# Patient Record
Sex: FEMALE | Race: WHITE | Employment: OTHER
[De-identification: names, ages, dates, MRNs, and addresses within clinical notes are randomized per-mention and may not be internally consistent; named-entity substitution may affect disease eponyms.]

---

## 2017-01-30 ENCOUNTER — RECORDS - HEALTHEAST (OUTPATIENT)
Dept: ADMINISTRATIVE | Facility: OTHER | Age: 72
End: 2017-01-30

## 2017-01-30 ENCOUNTER — RECORDS - HEALTHEAST (OUTPATIENT)
Dept: BONE DENSITY | Facility: CLINIC | Age: 72
End: 2017-01-30

## 2017-01-30 ENCOUNTER — HOSPITAL ENCOUNTER (OUTPATIENT)
Dept: MAMMOGRAPHY | Facility: CLINIC | Age: 72
Discharge: HOME | End: 2017-01-30
Attending: INTERNAL MEDICINE

## 2017-01-30 ENCOUNTER — TRANSFERRED RECORDS (OUTPATIENT)
Dept: HEALTH INFORMATION MANAGEMENT | Facility: CLINIC | Age: 72
End: 2017-01-30

## 2017-01-30 DIAGNOSIS — S22.000G WEDGE COMPRESSION FRACTURE OF UNSPECIFIED THORACIC VERTEBRA, SUBSEQUENT ENCOUNTER FOR FRACTURE WITH DELAYED HEALING: ICD-10-CM

## 2017-01-30 DIAGNOSIS — Z12.31 VISIT FOR SCREENING MAMMOGRAM: ICD-10-CM

## 2017-01-30 DIAGNOSIS — M89.9 DISORDER OF BONE, UNSPECIFIED: ICD-10-CM

## 2017-01-30 DIAGNOSIS — M94.9 DISORDER OF CARTILAGE, UNSPECIFIED: ICD-10-CM

## 2017-01-30 DIAGNOSIS — M54.40 LUMBAGO WITH SCIATICA, UNSPECIFIED SIDE: ICD-10-CM

## 2017-05-01 ENCOUNTER — COMMUNICATION - HEALTHEAST (OUTPATIENT)
Dept: INTENSIVE CARE | Facility: CLINIC | Age: 72
End: 2017-05-01

## 2017-05-16 ENCOUNTER — COMMUNICATION - HEALTHEAST (OUTPATIENT)
Dept: INTERNAL MEDICINE | Facility: CLINIC | Age: 72
End: 2017-05-16

## 2017-07-04 ENCOUNTER — COMMUNICATION - HEALTHEAST (OUTPATIENT)
Dept: INTENSIVE CARE | Facility: CLINIC | Age: 72
End: 2017-07-04

## 2017-08-06 ENCOUNTER — COMMUNICATION - HEALTHEAST (OUTPATIENT)
Dept: INTENSIVE CARE | Facility: CLINIC | Age: 72
End: 2017-08-06

## 2017-09-30 ENCOUNTER — RECORDS - HEALTHEAST (OUTPATIENT)
Dept: ADMINISTRATIVE | Facility: OTHER | Age: 72
End: 2017-09-30

## 2017-10-02 ENCOUNTER — COMMUNICATION - HEALTHEAST (OUTPATIENT)
Dept: INTERNAL MEDICINE | Facility: CLINIC | Age: 72
End: 2017-10-02

## 2017-10-02 ENCOUNTER — OFFICE VISIT - HEALTHEAST (OUTPATIENT)
Dept: INTERNAL MEDICINE | Facility: CLINIC | Age: 72
End: 2017-10-02

## 2017-10-02 DIAGNOSIS — F41.1 ANXIETY STATE: ICD-10-CM

## 2017-10-02 DIAGNOSIS — J98.01 BRONCHOSPASM, ACUTE: ICD-10-CM

## 2017-10-02 DIAGNOSIS — I51.81 STRESS-INDUCED CARDIOMYOPATHY: ICD-10-CM

## 2017-10-02 ASSESSMENT — MIFFLIN-ST. JEOR: SCORE: 1237.5

## 2017-10-24 ENCOUNTER — COMMUNICATION - HEALTHEAST (OUTPATIENT)
Dept: INTERNAL MEDICINE | Facility: CLINIC | Age: 72
End: 2017-10-24

## 2017-10-31 ENCOUNTER — COMMUNICATION - HEALTHEAST (OUTPATIENT)
Dept: INTERNAL MEDICINE | Facility: CLINIC | Age: 72
End: 2017-10-31

## 2017-10-31 DIAGNOSIS — F41.1 ANXIETY STATE: ICD-10-CM

## 2017-11-06 ENCOUNTER — COMMUNICATION - HEALTHEAST (OUTPATIENT)
Dept: INTERNAL MEDICINE | Facility: CLINIC | Age: 72
End: 2017-11-06

## 2018-02-21 ENCOUNTER — COMMUNICATION - HEALTHEAST (OUTPATIENT)
Dept: INTENSIVE CARE | Facility: CLINIC | Age: 73
End: 2018-02-21

## 2018-02-21 DIAGNOSIS — R52 PAIN: ICD-10-CM

## 2018-03-20 ENCOUNTER — HOSPITAL ENCOUNTER (OUTPATIENT)
Dept: MAMMOGRAPHY | Facility: CLINIC | Age: 73
Discharge: HOME | End: 2018-03-20
Attending: INTERNAL MEDICINE

## 2018-03-20 DIAGNOSIS — Z12.31 VISIT FOR SCREENING MAMMOGRAM: ICD-10-CM

## 2018-03-21 ENCOUNTER — RECORDS - HEALTHEAST (OUTPATIENT)
Dept: ADMINISTRATIVE | Facility: OTHER | Age: 73
End: 2018-03-21

## 2018-04-03 ENCOUNTER — OFFICE VISIT - HEALTHEAST (OUTPATIENT)
Dept: INTERNAL MEDICINE | Facility: CLINIC | Age: 73
End: 2018-04-03

## 2018-04-03 ENCOUNTER — COMMUNICATION - HEALTHEAST (OUTPATIENT)
Dept: INTERNAL MEDICINE | Facility: CLINIC | Age: 73
End: 2018-04-03

## 2018-04-03 DIAGNOSIS — I51.81 STRESS-INDUCED CARDIOMYOPATHY: ICD-10-CM

## 2018-04-03 DIAGNOSIS — S22.080A T12 COMPRESSION FRACTURE (H): ICD-10-CM

## 2018-04-03 DIAGNOSIS — I25.10 CAD (CORONARY ARTERY DISEASE): ICD-10-CM

## 2018-04-03 DIAGNOSIS — M94.9 DISORDER OF BONE AND CARTILAGE: ICD-10-CM

## 2018-04-03 DIAGNOSIS — Z01.818 PREOP EXAMINATION: ICD-10-CM

## 2018-04-03 DIAGNOSIS — M89.9 DISORDER OF BONE AND CARTILAGE: ICD-10-CM

## 2018-04-03 LAB
ALBUMIN SERPL-MCNC: 4 G/DL (ref 3.5–5)
ALP SERPL-CCNC: 72 U/L (ref 45–120)
ALT SERPL W P-5'-P-CCNC: 16 U/L (ref 0–45)
ANION GAP SERPL CALCULATED.3IONS-SCNC: 11 MMOL/L (ref 5–18)
AST SERPL W P-5'-P-CCNC: 21 U/L (ref 0–40)
BILIRUB SERPL-MCNC: 0.4 MG/DL (ref 0–1)
BUN SERPL-MCNC: 22 MG/DL (ref 8–28)
CALCIUM SERPL-MCNC: 9.8 MG/DL (ref 8.5–10.5)
CHLORIDE BLD-SCNC: 102 MMOL/L (ref 98–107)
CHOLEST SERPL-MCNC: 205 MG/DL
CO2 SERPL-SCNC: 26 MMOL/L (ref 22–31)
CREAT SERPL-MCNC: 0.8 MG/DL (ref 0.6–1.1)
ERYTHROCYTE [DISTWIDTH] IN BLOOD BY AUTOMATED COUNT: 10.8 % (ref 11–14.5)
FASTING STATUS PATIENT QL REPORTED: ABNORMAL
GFR SERPL CREATININE-BSD FRML MDRD: >60 ML/MIN/1.73M2
GLUCOSE BLD-MCNC: 82 MG/DL (ref 70–125)
HCT VFR BLD AUTO: 39.8 % (ref 35–47)
HDLC SERPL-MCNC: 62 MG/DL
HGB BLD-MCNC: 13.3 G/DL (ref 12–16)
LDLC SERPL CALC-MCNC: 126 MG/DL
MCH RBC QN AUTO: 30.9 PG (ref 27–34)
MCHC RBC AUTO-ENTMCNC: 33.5 G/DL (ref 32–36)
MCV RBC AUTO: 92 FL (ref 80–100)
PLATELET # BLD AUTO: 164 THOU/UL (ref 140–440)
PMV BLD AUTO: 9.1 FL (ref 7–10)
POTASSIUM BLD-SCNC: 4.7 MMOL/L (ref 3.5–5)
PROT SERPL-MCNC: 7 G/DL (ref 6–8)
RBC # BLD AUTO: 4.31 MILL/UL (ref 3.8–5.4)
SODIUM SERPL-SCNC: 139 MMOL/L (ref 136–145)
TRIGL SERPL-MCNC: 85 MG/DL
WBC: 5 THOU/UL (ref 4–11)

## 2018-04-03 ASSESSMENT — MIFFLIN-ST. JEOR: SCORE: 1214.82

## 2018-04-04 LAB
ATRIAL RATE - MUSE: 66 BPM
DIASTOLIC BLOOD PRESSURE - MUSE: NORMAL MMHG
INTERPRETATION ECG - MUSE: NORMAL
P AXIS - MUSE: 48 DEGREES
PR INTERVAL - MUSE: 162 MS
QRS DURATION - MUSE: 72 MS
QT - MUSE: 384 MS
QTC - MUSE: 402 MS
R AXIS - MUSE: 4 DEGREES
SYSTOLIC BLOOD PRESSURE - MUSE: NORMAL MMHG
T AXIS - MUSE: 43 DEGREES
VENTRICULAR RATE- MUSE: 66 BPM

## 2018-04-16 RX ORDER — FLUTICASONE PROPIONATE 220 UG/1
2 AEROSOL, METERED RESPIRATORY (INHALATION) 2 TIMES DAILY
Status: ON HOLD | COMMUNITY
End: 2018-04-18

## 2018-04-16 RX ORDER — ALBUTEROL SULFATE 90 UG/1
2 AEROSOL, METERED RESPIRATORY (INHALATION) PRN
Status: ON HOLD | COMMUNITY
End: 2018-04-18

## 2018-04-18 ENCOUNTER — HOSPITAL ENCOUNTER (OUTPATIENT)
Facility: CLINIC | Age: 73
Discharge: HOME | End: 2018-04-18
Attending: ORTHOPAEDIC SURGERY | Admitting: ORTHOPAEDIC SURGERY
Payer: MEDICARE

## 2018-04-18 ENCOUNTER — ANESTHESIA EVENT (OUTPATIENT)
Dept: SURGERY | Facility: CLINIC | Age: 73
End: 2018-04-18
Payer: MEDICARE

## 2018-04-18 ENCOUNTER — ANESTHESIA (OUTPATIENT)
Dept: SURGERY | Facility: CLINIC | Age: 73
End: 2018-04-18
Payer: MEDICARE

## 2018-04-18 ENCOUNTER — SURGERY (OUTPATIENT)
Age: 73
End: 2018-04-18

## 2018-04-18 VITALS
OXYGEN SATURATION: 94 % | WEIGHT: 144.1 LBS | DIASTOLIC BLOOD PRESSURE: 57 MMHG | BODY MASS INDEX: 20.63 KG/M2 | SYSTOLIC BLOOD PRESSURE: 106 MMHG | HEIGHT: 70 IN | RESPIRATION RATE: 18 BRPM | TEMPERATURE: 97.5 F

## 2018-04-18 DIAGNOSIS — M20.5X9 ACQUIRED CLAW TOE, UNSPECIFIED LATERALITY: Primary | ICD-10-CM

## 2018-04-18 PROCEDURE — 25000128 H RX IP 250 OP 636: Performed by: NURSE ANESTHETIST, CERTIFIED REGISTERED

## 2018-04-18 PROCEDURE — 27210794 ZZH OR GENERAL SUPPLY STERILE: Performed by: ORTHOPAEDIC SURGERY

## 2018-04-18 PROCEDURE — 25000132 ZZH RX MED GY IP 250 OP 250 PS 637: Mod: GY | Performed by: ORTHOPAEDIC SURGERY

## 2018-04-18 PROCEDURE — 37000008 ZZH ANESTHESIA TECHNICAL FEE, 1ST 30 MIN: Performed by: ORTHOPAEDIC SURGERY

## 2018-04-18 PROCEDURE — 27210995 ZZH RX 272: Performed by: ORTHOPAEDIC SURGERY

## 2018-04-18 PROCEDURE — 36000056 ZZH SURGERY LEVEL 3 1ST 30 MIN: Performed by: ORTHOPAEDIC SURGERY

## 2018-04-18 PROCEDURE — 27110028 ZZH OR GENERAL SUPPLY NON-STERILE: Performed by: ORTHOPAEDIC SURGERY

## 2018-04-18 PROCEDURE — 37000009 ZZH ANESTHESIA TECHNICAL FEE, EACH ADDTL 15 MIN: Performed by: ORTHOPAEDIC SURGERY

## 2018-04-18 PROCEDURE — 25000128 H RX IP 250 OP 636: Performed by: ORTHOPAEDIC SURGERY

## 2018-04-18 PROCEDURE — 25000125 ZZHC RX 250: Performed by: ORTHOPAEDIC SURGERY

## 2018-04-18 PROCEDURE — 25000566 ZZH SEVOFLURANE, EA 15 MIN: Performed by: ORTHOPAEDIC SURGERY

## 2018-04-18 PROCEDURE — 71000013 ZZH RECOVERY PHASE 1 LEVEL 1 EA ADDTL HR: Performed by: ORTHOPAEDIC SURGERY

## 2018-04-18 PROCEDURE — 36000058 ZZH SURGERY LEVEL 3 EA 15 ADDTL MIN: Performed by: ORTHOPAEDIC SURGERY

## 2018-04-18 PROCEDURE — 25000128 H RX IP 250 OP 636: Performed by: ANESTHESIOLOGY

## 2018-04-18 PROCEDURE — 40000170 ZZH STATISTIC PRE-PROCEDURE ASSESSMENT II: Performed by: ORTHOPAEDIC SURGERY

## 2018-04-18 PROCEDURE — 40000856 ZZH STATISTIC SAME DAY SURG

## 2018-04-18 PROCEDURE — 71000012 ZZH RECOVERY PHASE 1 LEVEL 1 FIRST HR: Performed by: ORTHOPAEDIC SURGERY

## 2018-04-18 PROCEDURE — A9270 NON-COVERED ITEM OR SERVICE: HCPCS | Mod: GY | Performed by: ORTHOPAEDIC SURGERY

## 2018-04-18 PROCEDURE — 25000125 ZZHC RX 250: Performed by: NURSE ANESTHETIST, CERTIFIED REGISTERED

## 2018-04-18 DEVICE — IMP WIRE KIRSCHNER 0.054X4" 1645-10-000: Type: IMPLANTABLE DEVICE | Site: FOOT | Status: FUNCTIONAL

## 2018-04-18 RX ORDER — ONDANSETRON 4 MG/1
4 TABLET, ORALLY DISINTEGRATING ORAL EVERY 6 HOURS PRN
Status: DISCONTINUED | OUTPATIENT
Start: 2018-04-18 | End: 2018-04-18 | Stop reason: HOSPADM

## 2018-04-18 RX ORDER — IBUPROFEN 600 MG/1
600 TABLET, FILM COATED ORAL EVERY 6 HOURS PRN
Status: DISCONTINUED | OUTPATIENT
Start: 2018-04-19 | End: 2018-04-18 | Stop reason: HOSPADM

## 2018-04-18 RX ORDER — PROPOFOL 10 MG/ML
INJECTION, EMULSION INTRAVENOUS CONTINUOUS PRN
Status: DISCONTINUED | OUTPATIENT
Start: 2018-04-18 | End: 2018-04-18

## 2018-04-18 RX ORDER — ONDANSETRON 2 MG/ML
INJECTION INTRAMUSCULAR; INTRAVENOUS PRN
Status: DISCONTINUED | OUTPATIENT
Start: 2018-04-18 | End: 2018-04-18

## 2018-04-18 RX ORDER — NALOXONE HYDROCHLORIDE 0.4 MG/ML
.1-.4 INJECTION, SOLUTION INTRAMUSCULAR; INTRAVENOUS; SUBCUTANEOUS
Status: DISCONTINUED | OUTPATIENT
Start: 2018-04-18 | End: 2018-04-18 | Stop reason: HOSPADM

## 2018-04-18 RX ORDER — PROCHLORPERAZINE MALEATE 5 MG
5 TABLET ORAL EVERY 6 HOURS PRN
Status: DISCONTINUED | OUTPATIENT
Start: 2018-04-18 | End: 2018-04-18 | Stop reason: HOSPADM

## 2018-04-18 RX ORDER — CARVEDILOL 6.25 MG/1
12.5 TABLET ORAL 2 TIMES DAILY WITH MEALS
Status: DISCONTINUED | OUTPATIENT
Start: 2018-04-18 | End: 2018-04-18 | Stop reason: HOSPADM

## 2018-04-18 RX ORDER — EPHEDRINE SULFATE 50 MG/ML
INJECTION, SOLUTION INTRAMUSCULAR; INTRAVENOUS; SUBCUTANEOUS PRN
Status: DISCONTINUED | OUTPATIENT
Start: 2018-04-18 | End: 2018-04-18

## 2018-04-18 RX ORDER — OXYCODONE HYDROCHLORIDE 5 MG/1
5-10 TABLET ORAL
Qty: 60 TABLET | Refills: 0 | Status: SHIPPED | OUTPATIENT
Start: 2018-04-18 | End: 2020-07-13

## 2018-04-18 RX ORDER — OXYCODONE HYDROCHLORIDE 5 MG/1
5-10 TABLET ORAL
Status: DISCONTINUED | OUTPATIENT
Start: 2018-04-18 | End: 2018-04-18 | Stop reason: HOSPADM

## 2018-04-18 RX ORDER — ONDANSETRON 2 MG/ML
4 INJECTION INTRAMUSCULAR; INTRAVENOUS EVERY 30 MIN PRN
Status: DISCONTINUED | OUTPATIENT
Start: 2018-04-18 | End: 2018-04-18 | Stop reason: HOSPADM

## 2018-04-18 RX ORDER — MEPERIDINE HYDROCHLORIDE 25 MG/ML
12.5 INJECTION INTRAMUSCULAR; INTRAVENOUS; SUBCUTANEOUS
Status: DISCONTINUED | OUTPATIENT
Start: 2018-04-18 | End: 2018-04-18 | Stop reason: HOSPADM

## 2018-04-18 RX ORDER — MULTIPLE VITAMINS W/ MINERALS TAB 9MG-400MCG
1 TAB ORAL DAILY
COMMUNITY
End: 2020-02-27

## 2018-04-18 RX ORDER — KETOROLAC TROMETHAMINE 30 MG/ML
INJECTION, SOLUTION INTRAMUSCULAR; INTRAVENOUS PRN
Status: DISCONTINUED | OUTPATIENT
Start: 2018-04-18 | End: 2018-04-18

## 2018-04-18 RX ORDER — FENTANYL CITRATE 50 UG/ML
25-50 INJECTION, SOLUTION INTRAMUSCULAR; INTRAVENOUS
Status: DISCONTINUED | OUTPATIENT
Start: 2018-04-18 | End: 2018-04-18 | Stop reason: HOSPADM

## 2018-04-18 RX ORDER — SODIUM CHLORIDE, SODIUM LACTATE, POTASSIUM CHLORIDE, CALCIUM CHLORIDE 600; 310; 30; 20 MG/100ML; MG/100ML; MG/100ML; MG/100ML
INJECTION, SOLUTION INTRAVENOUS CONTINUOUS
Status: DISCONTINUED | OUTPATIENT
Start: 2018-04-18 | End: 2018-04-18 | Stop reason: HOSPADM

## 2018-04-18 RX ORDER — HYDROMORPHONE HYDROCHLORIDE 1 MG/ML
.3-.5 INJECTION, SOLUTION INTRAMUSCULAR; INTRAVENOUS; SUBCUTANEOUS EVERY 10 MIN PRN
Status: DISCONTINUED | OUTPATIENT
Start: 2018-04-18 | End: 2018-04-18 | Stop reason: HOSPADM

## 2018-04-18 RX ORDER — DEXAMETHASONE SODIUM PHOSPHATE 4 MG/ML
INJECTION, SOLUTION INTRA-ARTICULAR; INTRALESIONAL; INTRAMUSCULAR; INTRAVENOUS; SOFT TISSUE PRN
Status: DISCONTINUED | OUTPATIENT
Start: 2018-04-18 | End: 2018-04-18

## 2018-04-18 RX ORDER — CITALOPRAM HYDROBROMIDE 20 MG/1
20 TABLET ORAL DAILY
Status: DISCONTINUED | OUTPATIENT
Start: 2018-04-18 | End: 2018-04-18 | Stop reason: HOSPADM

## 2018-04-18 RX ORDER — ONDANSETRON 4 MG/1
4 TABLET, ORALLY DISINTEGRATING ORAL EVERY 30 MIN PRN
Status: DISCONTINUED | OUTPATIENT
Start: 2018-04-18 | End: 2018-04-18 | Stop reason: HOSPADM

## 2018-04-18 RX ORDER — BUPIVACAINE HYDROCHLORIDE 2.5 MG/ML
INJECTION, SOLUTION INFILTRATION; PERINEURAL PRN
Status: DISCONTINUED | OUTPATIENT
Start: 2018-04-18 | End: 2018-04-18 | Stop reason: HOSPADM

## 2018-04-18 RX ORDER — CEPHALEXIN 500 MG/1
500 CAPSULE ORAL 3 TIMES DAILY
Qty: 6 CAPSULE | Refills: 0 | Status: SHIPPED | OUTPATIENT
Start: 2018-04-18 | End: 2018-04-20

## 2018-04-18 RX ORDER — PROPOFOL 10 MG/ML
INJECTION, EMULSION INTRAVENOUS PRN
Status: DISCONTINUED | OUTPATIENT
Start: 2018-04-18 | End: 2018-04-18

## 2018-04-18 RX ORDER — LIDOCAINE 40 MG/G
CREAM TOPICAL
Status: DISCONTINUED | OUTPATIENT
Start: 2018-04-18 | End: 2018-04-18 | Stop reason: HOSPADM

## 2018-04-18 RX ORDER — KETOROLAC TROMETHAMINE 15 MG/ML
15 INJECTION, SOLUTION INTRAMUSCULAR; INTRAVENOUS EVERY 6 HOURS
Status: DISCONTINUED | OUTPATIENT
Start: 2018-04-18 | End: 2018-04-18 | Stop reason: HOSPADM

## 2018-04-18 RX ORDER — ACETAMINOPHEN 325 MG/1
650 TABLET ORAL EVERY 6 HOURS PRN
Status: DISCONTINUED | OUTPATIENT
Start: 2018-04-18 | End: 2018-04-18 | Stop reason: HOSPADM

## 2018-04-18 RX ORDER — CEFAZOLIN SODIUM 1 G/3ML
1 INJECTION, POWDER, FOR SOLUTION INTRAMUSCULAR; INTRAVENOUS SEE ADMIN INSTRUCTIONS
Status: DISCONTINUED | OUTPATIENT
Start: 2018-04-18 | End: 2018-04-18 | Stop reason: HOSPADM

## 2018-04-18 RX ORDER — FENTANYL CITRATE 50 UG/ML
INJECTION, SOLUTION INTRAMUSCULAR; INTRAVENOUS PRN
Status: DISCONTINUED | OUTPATIENT
Start: 2018-04-18 | End: 2018-04-18

## 2018-04-18 RX ORDER — IBUPROFEN 600 MG/1
600 TABLET, FILM COATED ORAL EVERY 6 HOURS PRN
Qty: 60 TABLET | Refills: 0 | Status: SHIPPED | OUTPATIENT
Start: 2018-04-18 | End: 2020-07-13

## 2018-04-18 RX ORDER — LIDOCAINE HYDROCHLORIDE 20 MG/ML
INJECTION, SOLUTION INFILTRATION; PERINEURAL PRN
Status: DISCONTINUED | OUTPATIENT
Start: 2018-04-18 | End: 2018-04-18

## 2018-04-18 RX ORDER — ONDANSETRON 2 MG/ML
4 INJECTION INTRAMUSCULAR; INTRAVENOUS EVERY 6 HOURS PRN
Status: DISCONTINUED | OUTPATIENT
Start: 2018-04-18 | End: 2018-04-18 | Stop reason: HOSPADM

## 2018-04-18 RX ORDER — CEFAZOLIN SODIUM 2 G/100ML
2 INJECTION, SOLUTION INTRAVENOUS
Status: COMPLETED | OUTPATIENT
Start: 2018-04-18 | End: 2018-04-18

## 2018-04-18 RX ORDER — HYDROMORPHONE HYDROCHLORIDE 1 MG/ML
0.2 INJECTION, SOLUTION INTRAMUSCULAR; INTRAVENOUS; SUBCUTANEOUS
Status: DISCONTINUED | OUTPATIENT
Start: 2018-04-18 | End: 2018-04-18 | Stop reason: HOSPADM

## 2018-04-18 RX ADMIN — PROPOFOL 150 MCG/KG/MIN: 10 INJECTION, EMULSION INTRAVENOUS at 07:29

## 2018-04-18 RX ADMIN — BUPIVACAINE HYDROCHLORIDE 3 ML: 2.5 INJECTION, SOLUTION EPIDURAL; INFILTRATION; INTRACAUDAL; PERINEURAL at 08:49

## 2018-04-18 RX ADMIN — PHENYLEPHRINE HYDROCHLORIDE 50 MCG: 10 INJECTION, SOLUTION INTRAMUSCULAR; INTRAVENOUS; SUBCUTANEOUS at 08:35

## 2018-04-18 RX ADMIN — FENTANYL CITRATE 50 MCG: 50 INJECTION, SOLUTION INTRAMUSCULAR; INTRAVENOUS at 07:29

## 2018-04-18 RX ADMIN — CEFAZOLIN SODIUM 2 G: 2 INJECTION, SOLUTION INTRAVENOUS at 08:54

## 2018-04-18 RX ADMIN — FENTANYL CITRATE 50 MCG: 50 INJECTION, SOLUTION INTRAMUSCULAR; INTRAVENOUS at 09:45

## 2018-04-18 RX ADMIN — OXYCODONE HYDROCHLORIDE 10 MG: 5 TABLET ORAL at 11:15

## 2018-04-18 RX ADMIN — DEXAMETHASONE SODIUM PHOSPHATE 4 MG: 4 INJECTION, SOLUTION INTRA-ARTICULAR; INTRALESIONAL; INTRAMUSCULAR; INTRAVENOUS; SOFT TISSUE at 07:36

## 2018-04-18 RX ADMIN — PHENYLEPHRINE HYDROCHLORIDE 50 MCG: 10 INJECTION, SOLUTION INTRAMUSCULAR; INTRAVENOUS; SUBCUTANEOUS at 08:33

## 2018-04-18 RX ADMIN — FENTANYL CITRATE 25 MCG: 50 INJECTION, SOLUTION INTRAMUSCULAR; INTRAVENOUS at 08:40

## 2018-04-18 RX ADMIN — Medication 5 MG: at 08:21

## 2018-04-18 RX ADMIN — GENTAMICIN SULFATE 1000 ML: 40 INJECTION, SOLUTION INTRAMUSCULAR; INTRAVENOUS at 08:18

## 2018-04-18 RX ADMIN — KETOROLAC TROMETHAMINE 15 MG: 30 INJECTION, SOLUTION INTRAMUSCULAR at 08:54

## 2018-04-18 RX ADMIN — ONDANSETRON 4 MG: 2 INJECTION INTRAMUSCULAR; INTRAVENOUS at 08:54

## 2018-04-18 RX ADMIN — FENTANYL CITRATE 50 MCG: 50 INJECTION, SOLUTION INTRAMUSCULAR; INTRAVENOUS at 09:36

## 2018-04-18 RX ADMIN — Medication 5 MG: at 07:47

## 2018-04-18 RX ADMIN — SODIUM CHLORIDE, POTASSIUM CHLORIDE, SODIUM LACTATE AND CALCIUM CHLORIDE: 600; 310; 30; 20 INJECTION, SOLUTION INTRAVENOUS at 07:27

## 2018-04-18 RX ADMIN — PROPOFOL 140 MG: 10 INJECTION, EMULSION INTRAVENOUS at 07:29

## 2018-04-18 RX ADMIN — LIDOCAINE HYDROCHLORIDE 60 MG: 20 INJECTION, SOLUTION INFILTRATION; PERINEURAL at 07:29

## 2018-04-18 RX ADMIN — Medication 5 MG: at 07:36

## 2018-04-18 RX ADMIN — CEFAZOLIN SODIUM 2 G: 2 INJECTION, SOLUTION INTRAVENOUS at 07:34

## 2018-04-18 RX ADMIN — Medication 5 MG: at 08:32

## 2018-04-18 RX ADMIN — Medication 5 MG: at 08:00

## 2018-04-18 ASSESSMENT — LIFESTYLE VARIABLES: TOBACCO_USE: 1

## 2018-04-18 NOTE — ANESTHESIA PREPROCEDURE EVALUATION
Anesthesia Evaluation     . Pt has had prior anesthetic.     History of anesthetic complications   - PONV        ROS/MED HX    ENT/Pulmonary:     (+)tobacco use, Past use , . .   (-) sleep apnea   Neurologic:     (+)CVA     Cardiovascular:     (+) --CAD, --. : . . . :. .       METS/Exercise Tolerance:     Hematologic:         Musculoskeletal:         GI/Hepatic:        (-) GERD and liver disease   Renal/Genitourinary:      (-) renal disease   Endo:         Psychiatric:     (+) psychiatric history anxiety      Infectious Disease:         Malignancy:         Other:                     Physical Exam  Normal systems: cardiovascular, pulmonary and dental    Airway   Mallampati: II  TM distance: >3 FB  Neck ROM: full    Dental     Cardiovascular       Pulmonary                     Anesthesia Plan      History & Physical Review  History and physical reviewed and following examination; no interval change.    ASA Status:  3 .    NPO Status:  > 8 hours    Plan for General and LMA with Intravenous induction. Maintenance will be Balanced.    PONV prophylaxis:  Ondansetron (or other 5HT-3) and Dexamethasone or Solumedrol  Propofol gtt      Postoperative Care  Postoperative pain management:  Multi-modal analgesia.      Consents  Anesthetic plan, risks, benefits and alternatives discussed with:  Patient..          Procedure: Procedure(s):  REPAIR HAMMER TOE  Preop diagnosis: BILATERAL LESSOR TOE DEFORMITY    No Known Allergies  Past Medical History:   Diagnosis Date     Anxiety      Breast cancer (H)      Hx of radiation therapy      MI, old      Osteopenia      Peripheral vertigo      PONV (postoperative nausea and vomiting)      Stress-induced cardiomyopathy      T12 compression fracture (H)      Past Surgical History:   Procedure Laterality Date     BREAST SURGERY      breast biopsy, breast lumpectomy     COSMETIC SURGERY      augmentation mammaplasty     GYN SURGERY      hysterectomy     ORTHOPEDIC SURGERY Bilateral      bunionectomy/10 toes repaired     Prior to Admission medications    Medication Sig Start Date End Date Taking? Authorizing Provider   Alendronate Sodium (FOSAMAX PO) Take 70 mg by mouth once a week   Yes Reported, Patient   ASPIRIN PO Take 81 mg by mouth daily   Yes Reported, Patient   Carvedilol (COREG PO) Take 12.5 mg by mouth 2 times daily (with meals)   Yes Reported, Patient   Citalopram Hydrobromide (CELEXA PO) Take 20 mg by mouth daily   Yes Reported, Patient   GLUCOSAMINE SULFATE PO Take 2 tablets by mouth daily   Yes Reported, Patient   multivitamin, therapeutic with minerals (MULTI-VITAMIN) TABS tablet Take 1 tablet by mouth daily   Yes Reported, Patient   NAPROXEN PO Take 500 mg by mouth 2 times daily   Yes Reported, Patient   ZONISAMIDE PO Take 25 mg by mouth daily   Yes Reported, Patient     Current Facility-Administered Medications Ordered in Epic   Medication Dose Route Frequency Last Rate Last Dose     ceFAZolin (ANCEF) 1 g vial to attach to  ml bag for ADULT or 50 ml bag for PEDS  1 g Intravenous See Admin Instructions         ceFAZolin (ANCEF) intermittent infusion 2 g in 100 mL dextrose PRE-MIX  2 g Intravenous Pre-Op/Pre-procedure x 1 dose         No current [facility]-ordered outpatient prescriptions on file.     Wt Readings from Last 1 Encounters:   04/18/18 65.4 kg (144 lb 1.6 oz)     Temp Readings from Last 1 Encounters:   04/18/18 36  C (96.8  F) (Oral)     BP Readings from Last 6 Encounters:   04/18/18 119/68     Pulse Readings from Last 4 Encounters:   No data found for Pulse     Resp Readings from Last 1 Encounters:   04/18/18 16     SpO2 Readings from Last 1 Encounters:   04/18/18 100%                     .

## 2018-04-18 NOTE — OP NOTE
Procedure Date: 04/18/2018      PREOPERATIVE DIAGNOSIS:  Fixed mallet deformities, right second, fourth, fifth toes, and left third toe.      POSTOPERATIVE DIAGNOSIS:  Fixed mallet deformities, right second, fourth, fifth toes, and left third toe.      PROCEDURE:  DuVries resection arthroplasty of the right second, fourth, fifth toes;  left third toe DIP joint.      DESCRIPTION OF PROCEDURE:  After adequate general anesthetic was obtained, both feet were prepped and draped in the usual sterile fashion.  Thigh tourniquet was utilized.  We turned our attention first to the right foot.  The limb was exsanguinated and  tourniquet raised.  Transverse elliptical skin incision was made over the dorsal aspect of the second toe DIP joint and carried down through the subcutaneous tissue.  An ellipse of skin and extensor tendon removed.  Collateral ligaments released.  Distal aspect of the middle phalanx was brought up through the wound and resected utilizing the Micro-Aire microsagittal saw.  This provided good decompression of the fixed malleting deformity.  In an identical fashion, the same procedure was performed on toe 4 and 5.  Wounds were then thoroughly irrigated with antibiotic solution.  Intramedullary K-wire driven antegrade and then retrograde across the interphalangeal joints into the corresponding metatarsal.  Extensor tendons repaired with interrupted 2-0 Vicryl, skin closed with 3-0 Prolene vertical mattress stitch.  Marcaine 0.25% was instilled along the base of each digit.  Sterile Camilo dressing was applied.  Tourniquet was released after 25 minutes and with good capillary refill we directed our attention to the left foot.      In an identical fashion, the same procedure was performed on the left third toe DIP joint.        Total tourniquet time on the left, 10 minutes.  The patient was then taken to the recovery room in good condition.         BRI MAHAN MD             D: 04/18/2018   T:  2018   MT: NTS      Name:     DAPHNIE GILES   MRN:      [id number]        Account:        [id number]   :      1945           Procedure Date: 2018      Document: [id number]

## 2018-04-18 NOTE — DISCHARGE INSTRUCTIONS
"Post-Operative Instructions Foot Surgery Patients  Edgar Carlisle M.D.    Board Certified  Fellowship, Foot and Ankle Surgery  Member, American Academy [employer] Orthopaedic Surgeons  American [employer]edic Foot and Ank[employer] Society    Direct Phone 9:00am to 5:00pm [phone]  After Hours/24 Hour On Call [phone]      Diet:  ? Take all prescribed medications with food   ? Narcotic pain medication can cause constipation  ? Avoid alcoholic beverages while taking pain medications   ? Increase dietary fiber, protein rich foods, fluids post operatively    Activity:  ? Elevate operative foot 90% of the time.  ? \"Swelling runs downhill\" - the more you elevate, the less permanent swelling you will experience  ? Post Op shoe/sandal must be on at all times with weight bearing  ? Unless told otherwise, you may put full weight on your foot  ? Walk with a flat foot  ? Use crutches/walker/cane as needed for balance and comfort  ? Do NOT walk on your heel, this pulls against possible pins/screws in your toes  ? You may be up to the bathroom, to the kitchen for meals and to change locations in the house.  ? You may do sit-ups (NOT BONE GRAFT PATIENTS), leg raises, upper body exercises  ? Every time you see a commercial on TV, change a web page or book page, perform ANKLE PUMPS  ? ANKLE PUMPS helps prevent a blood clot, pump swelling back to you heart  ? Adjust your immobilized foot/ankle to relieve pressure on your heel  ? Do not try to wiggle your toes    Special Care Instructions:     ? DO NOT CHANGE OR ALTER THE DRESSING  ? Keep your dressing(s) dry;    ? Sponge bath or wrap seal your foot (with shoe on) for shower  ? It is normal to have some incisional drainage  ? It is not unusual to have some \"numbness\" in foot and ankle following surgery  ? Smoking should be avoided.  It will interfere in your healing.  ? Check pins daily:  ? Do not push pin in if it comes out  ? Do not pull pin out if it goes in          Report to your " Doctor if any of the following occur:  ? Elevated temperature, 101o or higher  ? Increased pain unrelieved by pain medication and/or elevation  ? Tight/loose/wet dressing  ? Increased swelling  ? Calf pain  ? Pin drainage  ? Pin migration    Pin out over 1  from tip of toe to tip of white cap    White cap comes off    White cap is pushing on tip of toe (no metal showing)  ? For any shortness of breath, DIAL 911, go to the Emergency Room      Medications:  ? Take all of the following medications with food.    ? Aspirin/Ecotrin 325 mg.:  1 tab 1x/day  ? This is for blood clot prevention  ? If you have sensitive stomach, Ecotrin can be less irritating  ? If you experience any unusual bruising or bleeding or ringing in the ears, stop this medication and call us  ? Oxycodone  1-2 every 3-4 hours as needed for pain  ? You may take 1 tablet with plain Tylenol or Ibuprofen for less severe pain or to decrease nausea/mental effect  ?  Hydrocodone  1-2 every 3-4 hours as needed for pain  ? You may take 1 tablet with plain Tylenol or Ibuprofen for less severe pain or to decrease nausea/mental effects  ? Ibuprofen 1 every 6 hours as needed for inflammatory pain        Your Next Appointment:    ? Appt. scheduled for ____Wed 5/2/18___2:00 pm________________________        Direct Phone 9:00am to 5:00pm [phone]    After Hours/24 Hour On Call [phone]      Same Day Surgery Discharge Instructions for  Sedation and General Anesthesia       It's not unusual to feel dizzy, light-headed or faint for up to 24 hours after surgery or while taking pain medication.  If you have these symptoms: sit for a few minutes before standing and have someone assist you when you get up to walk or use the bathroom.      You should rest and relax for the next 24 hours. We recommend you make arrangements to have an adult stay with you for at least 24 hours after your discharge.  Avoid hazardous and strenuous activity.      DO NOT DRIVE any  vehicle or operate mechanical equipment for 24 hours following the end of your surgery.  Even though you may feel normal, your reactions may be affected by the medication you have received.      Do not drink alcoholic beverages for 24 hours following surgery.       Slowly progress to your regular diet as you feel able. It's not unusual to feel nauseated and/or vomit after receiving anesthesia.  If you develop these symptoms, drink clear liquids (apple juice, ginger ale, broth, 7-up, etc. ) until you feel better.  If your nausea and vomiting persists for 24 hours, please notify your surgeon.        All narcotic pain medications, along with inactivity and anesthesia, can cause constipation. Drinking plenty of liquids and increasing fiber intake will help.      For any questions of a medical nature, call your surgeon.      Do not make important decisions for 24 hours.      If you had general anesthesia, you may have a sore throat for a couple of days related to the breathing tube used during surgery.  You may use Cepacol lozenges to help with this discomfort.  If it worsens or if you develop a fever, contact your surgeon.       If you feel your pain is not well managed with the pain medications prescribed by your surgeon, please contact your surgeon's office to let them know so they can address your concerns.       Today you received Toradol, an antiinflammatory medication similar to Ibuprofen.  You should not take other antiinflammatory medication, such as Ibuprofen, Motrin, Advil, Aleve, Naprosyn, etc, until 3 :00 p.m.

## 2018-04-18 NOTE — PROGRESS NOTES
Admission medication history interview status for the 4/18/2018  admission is complete. See EPIC admission navigator for prior to admission medications     Medication history source reliability:Good    Medication history interview source(s):Patient    Medication history resources (including written lists, pill bottles, clinic record):Patient mailed in her medication list prior to surgery    Primary pharmacy.[facility]    Additional medication history information not noted on PTA med list :None    Time spent in this activity: 40 minutes    Prior to Admission medications    Medication Sig Last Dose Taking? Auth Provider   multivitamin, therapeutic with minerals (MULTI-VITAMIN) TABS tablet Take 1 tablet by mouth daily 4/17/2018 at AM Yes Reported, Patient   GLUCOSAMINE SULFATE PO Take 2 tablets by mouth daily 4/17/2018 at AM Yes Reported, Patient   Carvedilol (COREG PO) Take 12.5 mg by mouth 2 times daily (with meals) 4/17/2018 at AM Yes Reported, Patient   ASPIRIN PO Take 81 mg by mouth daily 4/17/2018 at AM Yes Reported, Patient   Alendronate Sodium (FOSAMAX PO) Take 70 mg by mouth once a week 4/8/2018 at AM Yes Reported, Patient   ZONISAMIDE PO Take 25 mg by mouth daily 4/17/2018 at AM Yes Reported, Patient   NAPROXEN PO Take 500 mg by mouth 2 times daily 4/17/2018 at AM Yes Reported, Patient   Citalopram Hydrobromide (CELEXA PO) Take 20 mg by mouth daily 4/17/2018 at AM Yes Reported, Patient

## 2018-04-18 NOTE — PROGRESS NOTES
1055 arrived to care suites about 1040. VSS. Cms both feet/toes all good, no drng, drsg CDI, pins right toes 2,4,5, and on left pin toe 3, all intact. No numbness or tingling in feet or legs. Both legs elevated on blue foam. Has ortho shoes on. Will use walker here and at home. Denies nausea and is drinking gingerale and eating crackers. Pain right foot at 4, left foot 0. Taught to use IS and able to do to 2500.   1125 pain right to 5 left 0. Given 2 oxycodone at about 1115. Iv saline locked. Drinking and eating crakcers with no nausea. Given meal. Pt given filled rx x3 from pharmacy and reviewed them each with her. Pt up with walker to bathroom, tolerated well. Able to void.  1300 VSS. Pain right toes 3, left 0. All sites and drsg CDI, pins CDI. No numbness or tingling. All avs discharge instructions reviewed with pt by melchor callahan and copy given. Pt tolerated meal. No nausea. Pain under control. Walked to bathroom again, tolerated well with ortho shoes and walker, has walker for at home. Voided again. Iv d/c'd.  called. Helped pt dress. Will discharge via wheel chair to  with both blue foam pads for elevating legs.

## 2018-04-18 NOTE — ANESTHESIA CARE TRANSFER NOTE
Patient: Kandis Tse    Procedure(s):  RIGHT SECOND, FOURTH, AND FIFTH  MALLET TOE RECONSTRUCTION WITH LEFT THIRD CLAWTOE RECONSTRUCTION (CHOICE)   - Wound Class: I-Clean    Diagnosis: BILATERAL LESSOR TOE DEFORMITY  Diagnosis Additional Information: No value filed.    Anesthesia Type:   General, LMA     Note:  Airway :Face Mask  Patient transferred to:PACU  Comments: Transferred to PACU, spontaneous respirations, 10L oxygen via facemask.  All monitors and alarms on and functioning, VSS.  Patient awake, comfortable.  Report to PACU RN.Handoff Report: Identifed the Patient, Identified the Reponsible Provider, Reviewed the pertinent medical history, Discussed the surgical course, Reviewed Intra-OP anesthesia mangement and issues during anesthesia, Set expectations for post-procedure period and Allowed opportunity for questions and acknowledgement of understanding      Vitals: (Last set prior to Anesthesia Care Transfer)    CRNA VITALS  4/18/2018 0834 - 4/18/2018 0911      4/18/2018             NIBP: 105/51    NIBP Mean: 65                Electronically Signed By: KELLIE Ponce CRNA  April 18, 2018  9:11 AM

## 2018-04-18 NOTE — OR NURSING
Report called to Niecy GOLDEN In care Suites. Pt transported with all belongings and post op shoes to care Suites #16 per tech Tristin per cart.  Rayo called and told status of pt and in  Room 316 care suites. Op pt medications returned to Op pharmacy to resend to Care Suites.

## 2018-04-18 NOTE — IP AVS SNAPSHOT
[place] [address]    [place] [address]    Phone:  [phone]                                       After Visit Summary   4/18/2018    Kandis Tse    MRN: [id number]           After Visit Summary Signature Page     I have received my discharge instructions, and my questions have been answered. I have discussed any challenges I see with this plan with the nurse or doctor.    ..........................................................................................................................................  Patient/Patient Representative Signature      ..........................................................................................................................................  Patient Representative Print Name and Relationship to Patient    ..................................................               ................................................  Date                                            Time    ..........................................................................................................................................  Reviewed by Signature/Title    ...................................................              ..............................................  Date                                                            Time

## 2018-04-18 NOTE — IP AVS SNAPSHOT
MRN:[id number]                      After Visit Summary   4/18/2018    Kandis Tse    MRN: [id number]           Visit Information        Department      4/18/2018  5:54 AM [facility]          Review of your medicines      START taking        Dose / Directions    cephALEXin 500 MG capsule   Commonly known as:  KEFLEX        Dose:  500 mg   Take 1 capsule (500 mg) by mouth 3 times daily for 2 days   Quantity:  6 capsule   Refills:  0       ibuprofen 600 MG tablet   Commonly known as:  ADVIL/MOTRIN        Dose:  600 mg   Take 1 tablet (600 mg) by mouth every 6 hours as needed for other (inflammatory pain)   Quantity:  60 tablet   Refills:  0       oxyCODONE IR 5 MG tablet   Commonly known as:  ROXICODONE        Dose:  5-10 mg   Take 1-2 tablets (5-10 mg) by mouth every 3 hours as needed for other (pain control or improvement in physical function. Hold dose for analgesic side effects.)   Quantity:  60 tablet   Refills:  0         CONTINUE these medicines which have NOT CHANGED        Dose / Directions    ASPIRIN PO        Dose:  81 mg   Take 81 mg by mouth daily   Refills:  0       CELEXA PO        Dose:  20 mg   Take 20 mg by mouth daily   Refills:  0       COREG PO        Dose:  12.5 mg   Take 12.5 mg by mouth 2 times daily (with meals)   Refills:  0       GLUCOSAMINE SULFATE PO        Dose:  2 tablet   Take 2 tablets by mouth daily   Refills:  0       Multi-vitamin Tabs tablet        Dose:  1 tablet   Take 1 tablet by mouth daily   Refills:  0       ZONISAMIDE PO        Dose:  25 mg   Take 25 mg by mouth daily   Refills:  0         STOP taking     FOSAMAX PO           NAPROXEN PO                Where to get your medicines      These medications were sent to [facility] - [address]  [address] [facility] [address] [address]     Phone:  [phone]     cephALEXin 500 MG capsule    ibuprofen 600 MG tablet         Some of these will need  "a paper prescription and others can be bought over the counter. Ask your nurse if you have questions.     Bring a paper prescription for each of these medications     oxyCODONE IR 5 MG tablet               Prescriptions were sent or printed at these locations (3 Prescriptions)                   [facility] [place], [place] - [address]   [address] [place], [place] [address]    Telephone:  [phone]   Fax:  [phone]   Hours:                  E-Prescribed (2 of 3)         cephALEXin (KEFLEX) 500 MG capsule               ibuprofen (ADVIL/MOTRIN) 600 MG tablet                 Printed at Department/Unit printer (1 of 3)         oxyCODONE IR (ROXICODONE) 5 MG tablet                 Protect others around you: Learn how to safely use, store and throw away your medicines at www.disposemymeds.org.         Follow-ups after your visit         Care Instructions        Further instructions from your care team       Post-Operative Instructions Foot Surgery Patients  Edgar Carlisle M.D.    Board Certified  Fellowship, Foot and Ankle Surgery  Member, American Academy of Orthopaedic Surgeons  American Orthopaedic Foot and Ankle Society    Direct Phone 9:00am to 5:00pm [phone]  After Hours/24 Hour On Call [phone]      Diet:  ? Take all prescribed medications with food   ? Narcotic pain medication can cause constipation  ? Avoid alcoholic beverages while taking pain medications   ? Increase dietary fiber, protein rich foods, fluids post operatively    Activity:  ? Elevate operative foot 90% of the time.  ? \"Swelling runs downhill\" - the more you elevate, the less permanent swelling you will experience  ? Post Op shoe/sandal must be on at all times with weight bearing  ? Unless told otherwise, you may put full weight on your foot  ? Walk with a flat foot  ? Use crutches/walker/cane as needed for balance and comfort  ? Do NOT walk on your heel, this pulls against possible pins/screws in " "your toes  ? You may be up to the bathroom, to the kitchen for meals and to change locations in the house.  ? You may do sit-ups (NOT BONE GRAFT PATIENTS), leg raises, upper body exercises  ? Every time you see a commercial on TV, change a web page or book page, perform ANKLE PUMPS  ? ANKLE PUMPS helps prevent a blood clot, pump swelling back to you heart  ? Adjust your immobilized foot/ankle to relieve pressure on your heel  ? Do not try to wiggle your toes    Special Care Instructions:     ? DO NOT CHANGE OR ALTER THE DRESSING  ? Keep your dressing(s) dry;    ? Sponge bath or wrap seal your foot (with shoe on) for shower  ? It is normal to have some incisional drainage  ? It is not unusual to have some \"numbness\" in foot and ankle following surgery  ? Smoking should be avoided.  It will interfere in your healing.  ? Check pins daily:  ? Do not push pin in if it comes out  ? Do not pull pin out if it goes in          Report to your Doctor if any of the following occur:  ? Elevated temperature, 101o or higher  ? Increased pain unrelieved by pain medication and/or elevation  ? Tight/loose/wet dressing  ? Increased swelling  ? Calf pain  ? Pin drainage  ? Pin migration    Pin out over 1  from tip of toe to tip of white cap    White cap comes off    White cap is pushing on tip of toe (no metal showing)  ? For any shortness of breath, DIAL 911, go to the Emergency Room      Medications:  ? Take all of the following medications with food.    ? Aspirin/Ecotrin 325 mg.:  1 tab 1x/day  ? This is for blood clot prevention  ? If you have sensitive stomach, Ecotrin can be less irritating  ? If you experience any unusual bruising or bleeding or ringing in the ears, stop this medication and call us  ? Oxycodone  1-2 every 3-4 hours as needed for pain  ? You may take 1 tablet with plain Tylenol or Ibuprofen for less severe pain or to decrease nausea/mental effect  ?  Hydrocodone  1-2 every 3-4 hours as needed for pain  ? You may " take 1 tablet with plain Tylenol or Ibuprofen for less severe pain or to decrease nausea/mental effects  ? Ibuprofen 1 every 6 hours as needed for inflammatory pain        Your Next Appointment:    ? Appt. scheduled for ____Wed 5/2/18___2:00 pm________________________        Direct Phone 9:00am to 5:00pm [phone]    After Hours/24 Hour On Call [phone]      Same Day Surgery Discharge Instructions for  Sedation and General Anesthesia       It's not unusual to feel dizzy, light-headed or faint for up to 24 hours after surgery or while taking pain medication.  If you have these symptoms: sit for a few minutes before standing and have someone assist you when you get up to walk or use the bathroom.      You should rest and relax for the next 24 hours. We recommend you make arrangements to have an adult stay with you for at least 24 hours after your discharge.  Avoid hazardous and strenuous activity.      DO NOT DRIVE any vehicle or operate mechanical equipment for 24 hours following the end of your surgery.  Even though you may feel normal, your reactions may be affected by the medication you have received.      Do not drink alcoholic beverages for 24 hours following surgery.       Slowly progress to your regular diet as you feel able. It's not unusual to feel nauseated and/or vomit after receiving anesthesia.  If you develop these symptoms, drink clear liquids (apple juice, ginger ale, broth, 7-up, etc. ) until you feel better.  If your nausea and vomiting persists for 24 hours, please notify your surgeon.        All narcotic pain medications, along with inactivity and anesthesia, can cause constipation. Drinking plenty of liquids and increasing fiber intake will help.      For any questions of a medical nature, call your surgeon.      Do not make important decisions for 24 hours.      If you had general anesthesia, you may have a sore throat for a couple of days related to the breathing tube used during  surgery.  You may use Cepacol lozenges to help with this discomfort.  If it worsens or if you develop a fever, contact your surgeon.       If you feel your pain is not well managed with the pain medications prescribed by your surgeon, please contact your surgeon's office to let them know so they can address your concerns.       Today you received Toradol, an antiinflammatory medication similar to Ibuprofen.  You should not take other antiinflammatory medication, such as Ibuprofen, Motrin, Advil, Aleve, Naprosyn, etc, until 3 :00 p.m.        Information about OPIOIDS     PRESCRIPTION OPIOIDS: WHAT YOU NEED TO KNOW   You have a prescription for an opioid (narcotic) pain medicine. Opioids can cause addiction. If you have a history of chemical dependency of any type, you are at a higher risk of becoming addicted to opioids. Only take this medicine after all other options have been tried. Take it for as short a time and as few doses as possible.     Do not:    Drive. If you drive while taking these medicines, you could be arrested for driving under the influence (DUI).    Operate heavy machinery    Do any other dangerous activities while taking these medicines.     Drink any alcohol while taking these medicines.      Take with any other medicines that contain acetaminophen. Read all labels carefully. Look for the word  acetaminophen  or  Tylenol.  Ask your pharmacist if you have questions or are unsure.    Store your pills in a secure place, locked if possible. We will not replace any lost or stolen medicine. If you don t finish your medicine, please throw away (dispose) as directed by your pharmacist. The [place] Pollution Control Agency has more information about safe disposal: https://www.pca.Replaced by [facility].[place]/living-green/managing-unwanted-medications    All opioids tend to cause constipation. Drink plenty of water and eat foods that have a lot of fiber, such as fruits, vegetables, prune juice, apple juice and high-fiber  "cereal. Take a laxative (Miralax, milk of magnesia, Colace, Senna) if you don t move your bowels at least every other day.          Additional Information About Your Visit        MyChart Information     [employer] lets you send messages to your doctor, view your test results, renew your prescriptions, schedule appointments and more. To sign up, go to [email or web address] . Click on \"Log in\" on the left side of the screen, which will take you to the Welcome page. Then click on \"Sign up Now\" on the right side of the page.     You will be asked to enter the access code listed below, as well as some personal information. Please follow the directions to create your username and password.     Your access code is: [email or web address]  Expires: 2018 11:31 AM     Your access code will  in 90 days. If you need help or a new code, please call your [place] clinic or [phone].        Care EveryWhere ID     This is your Care EveryWhere ID. This could be used by other organizations to access your [place] medical records  [id number]        Your Vitals Were     Blood Pressure Temperature Respirations Height Weight Pulse Oximetry    111/63 97.5  F (36.4  C) 18 1.778 m (5' 10\") 65.4 kg (144 lb 1.6 oz) 99%    BMI (Body Mass Index)                   20.68 kg/m2            Primary Care Provider Office Phone # Fax #    Vineet De La Paz [phone]      Equal Access to Services     [facility] AH: Hadii jax ku hadasho Soomaali, waaxda luqadaha, qaybta kaalmada adeegyada, waxay sudhir pardo . So [facility] [phone].    ATENCIÓN: Si habla español, tiene a jasso disposición servicios gratuitos de asistencia lingüística. Llame al [phone].    We comply with applicable federal civil rights laws and [place] laws. We do not discriminate on the basis of race, color, national origin, age, disability, sex, sexual orientation, or gender identity.            Thank you!     Thank you for choosing " [facility] for your care. Our goal is always to provide you with excellent care. Hearing back from our patients is one way we can continue to improve our services. Please take a few minutes to complete the written survey that you may receive in the mail after you visit with us. Thank you!             Medication List: This is a list of all your medications and when to take them. Check marks below indicate your daily home schedule. Keep this list as a reference.      Medications           Morning Afternoon Evening Bedtime As Needed    ASPIRIN PO   Take 81 mg by mouth daily                                CELEXA PO   Take 20 mg by mouth daily                                cephALEXin 500 MG capsule   Commonly known as:  KEFLEX   Take 1 capsule (500 mg) by mouth 3 times daily for 2 days                                COREG PO   Take 12.5 mg by mouth 2 times daily (with meals)                                GLUCOSAMINE SULFATE PO   Take 2 tablets by mouth daily                                ibuprofen 600 MG tablet   Commonly known as:  ADVIL/MOTRIN   Take 1 tablet (600 mg) by mouth every 6 hours as needed for other (inflammatory pain)                                Multi-vitamin Tabs tablet   Take 1 tablet by mouth daily                                oxyCODONE IR 5 MG tablet   Commonly known as:  ROXICODONE   Take 1-2 tablets (5-10 mg) by mouth every 3 hours as needed for other (pain control or improvement in physical function. Hold dose for analgesic side effects.)   Last time this was given:  10 mg on 4/18/2018 11:15 AM                                ZONISAMIDE PO   Take 25 mg by mouth daily

## 2018-04-18 NOTE — ANESTHESIA POSTPROCEDURE EVALUATION
Patient: Kandis Tse    Procedure(s):  RIGHT SECOND, FOURTH, AND FIFTH  MALLET TOE RECONSTRUCTION WITH LEFT THIRD CLAWTOE RECONSTRUCTION (CHOICE)   - Wound Class: I-Clean    Diagnosis:BILATERAL LESSOR TOE DEFORMITY  Diagnosis Additional Information: No value filed.    Anesthesia Type:  General, LMA    Note:  Anesthesia Post Evaluation    Patient location during evaluation: PACU  Patient participation: Able to fully participate in evaluation  Level of consciousness: awake and alert  Pain management: satisfactory to patient  Airway patency: patent  Cardiovascular status: hemodynamically stable  Respiratory status: acceptable and unassisted  Hydration status: balanced  PONV: none     Anesthetic complications: None          Last vitals:  Vitals:    04/18/18 1130 04/18/18 1200 04/18/18 1245   BP: 120/68 101/55 106/57   Resp: 18 18 18   Temp:      SpO2: 96%  94%         Electronically Signed By: Gil Devi MD  April 18, 2018  3:27 PM

## 2018-05-11 ENCOUNTER — COMMUNICATION - HEALTHEAST (OUTPATIENT)
Dept: INTERNAL MEDICINE | Facility: CLINIC | Age: 73
End: 2018-05-11

## 2018-05-11 DIAGNOSIS — I25.10 CAD (CORONARY ARTERY DISEASE): ICD-10-CM

## 2018-05-11 DIAGNOSIS — I51.81 STRESS-INDUCED CARDIOMYOPATHY: ICD-10-CM

## 2018-08-14 ENCOUNTER — COMMUNICATION - HEALTHEAST (OUTPATIENT)
Dept: INTENSIVE CARE | Facility: CLINIC | Age: 73
End: 2018-08-14

## 2018-11-21 ENCOUNTER — COMMUNICATION - HEALTHEAST (OUTPATIENT)
Dept: INTERNAL MEDICINE | Facility: CLINIC | Age: 73
End: 2018-11-21

## 2018-11-21 DIAGNOSIS — I25.10 CAD (CORONARY ARTERY DISEASE): ICD-10-CM

## 2018-11-21 DIAGNOSIS — I51.81 STRESS-INDUCED CARDIOMYOPATHY: ICD-10-CM

## 2019-01-07 ENCOUNTER — COMMUNICATION - HEALTHEAST (OUTPATIENT)
Dept: INTERNAL MEDICINE | Facility: CLINIC | Age: 74
End: 2019-01-07

## 2019-01-07 DIAGNOSIS — F41.1 ANXIETY STATE: ICD-10-CM

## 2019-02-25 ENCOUNTER — COMMUNICATION - HEALTHEAST (OUTPATIENT)
Dept: INTENSIVE CARE | Facility: CLINIC | Age: 74
End: 2019-02-25

## 2019-02-25 DIAGNOSIS — R52 PAIN: ICD-10-CM

## 2019-03-26 ENCOUNTER — OFFICE VISIT - HEALTHEAST (OUTPATIENT)
Dept: INTERNAL MEDICINE | Facility: CLINIC | Age: 74
End: 2019-03-26

## 2019-03-26 ENCOUNTER — COMMUNICATION - HEALTHEAST (OUTPATIENT)
Dept: TELEHEALTH | Facility: CLINIC | Age: 74
End: 2019-03-26

## 2019-03-26 DIAGNOSIS — I51.81 STRESS-INDUCED CARDIOMYOPATHY: ICD-10-CM

## 2019-03-26 DIAGNOSIS — S22.080A T12 COMPRESSION FRACTURE (H): ICD-10-CM

## 2019-03-26 DIAGNOSIS — M89.9 DISORDER OF BONE AND CARTILAGE: ICD-10-CM

## 2019-03-26 DIAGNOSIS — S22.000A CLOSED COMPRESSION FRACTURE OF THORACIC VERTEBRA, INITIAL ENCOUNTER (H): ICD-10-CM

## 2019-03-26 DIAGNOSIS — E88.810 METABOLIC SYNDROME: ICD-10-CM

## 2019-03-26 DIAGNOSIS — R06.09 OTHER FORMS OF DYSPNEA: ICD-10-CM

## 2019-03-26 DIAGNOSIS — R06.09 DOE (DYSPNEA ON EXERTION): ICD-10-CM

## 2019-03-26 DIAGNOSIS — M94.9 DISORDER OF BONE AND CARTILAGE: ICD-10-CM

## 2019-03-26 LAB
ALBUMIN SERPL-MCNC: 4.3 G/DL (ref 3.5–5)
ALP SERPL-CCNC: 88 U/L (ref 45–120)
ALT SERPL W P-5'-P-CCNC: 10 U/L (ref 0–45)
ANION GAP SERPL CALCULATED.3IONS-SCNC: 12 MMOL/L (ref 5–18)
AST SERPL W P-5'-P-CCNC: 17 U/L (ref 0–40)
BILIRUB SERPL-MCNC: 0.4 MG/DL (ref 0–1)
BUN SERPL-MCNC: 22 MG/DL (ref 8–28)
CALCIUM SERPL-MCNC: 9.9 MG/DL (ref 8.5–10.5)
CHLORIDE BLD-SCNC: 103 MMOL/L (ref 98–107)
CO2 SERPL-SCNC: 25 MMOL/L (ref 22–31)
CREAT SERPL-MCNC: 0.94 MG/DL (ref 0.6–1.1)
ERYTHROCYTE [DISTWIDTH] IN BLOOD BY AUTOMATED COUNT: 13.1 % (ref 11–14.5)
GFR SERPL CREATININE-BSD FRML MDRD: 58 ML/MIN/1.73M2
GLUCOSE BLD-MCNC: 96 MG/DL (ref 70–125)
HCT VFR BLD AUTO: 42.4 % (ref 35–47)
HGB BLD-MCNC: 14.4 G/DL (ref 12–16)
MCH RBC QN AUTO: 30.1 PG (ref 27–34)
MCHC RBC AUTO-ENTMCNC: 34 G/DL (ref 32–36)
MCV RBC AUTO: 88 FL (ref 80–100)
PLATELET # BLD AUTO: 183 THOU/UL (ref 140–440)
PMV BLD AUTO: 9 FL (ref 7–10)
POTASSIUM BLD-SCNC: 4.3 MMOL/L (ref 3.5–5)
PROT SERPL-MCNC: 7.4 G/DL (ref 6–8)
RBC # BLD AUTO: 4.8 MILL/UL (ref 3.8–5.4)
SODIUM SERPL-SCNC: 140 MMOL/L (ref 136–145)
WBC: 7.4 THOU/UL (ref 4–11)

## 2019-03-26 ASSESSMENT — MIFFLIN-ST. JEOR: SCORE: 1229.56

## 2019-04-02 ENCOUNTER — COMMUNICATION - HEALTHEAST (OUTPATIENT)
Dept: INTERNAL MEDICINE | Facility: CLINIC | Age: 74
End: 2019-04-02

## 2019-04-04 ENCOUNTER — COMMUNICATION - HEALTHEAST (OUTPATIENT)
Dept: INTERNAL MEDICINE | Facility: CLINIC | Age: 74
End: 2019-04-04

## 2019-04-04 ENCOUNTER — HOSPITAL ENCOUNTER (OUTPATIENT)
Dept: CARDIOLOGY | Facility: CLINIC | Age: 74
Discharge: HOME | End: 2019-04-04
Attending: INTERNAL MEDICINE

## 2019-04-04 DIAGNOSIS — I51.81 STRESS-INDUCED CARDIOMYOPATHY: ICD-10-CM

## 2019-04-04 DIAGNOSIS — R06.09 OTHER FORMS OF DYSPNEA: ICD-10-CM

## 2019-04-04 DIAGNOSIS — R06.09 DOE (DYSPNEA ON EXERTION): ICD-10-CM

## 2019-04-04 LAB
BSA FOR ECHO PROCEDURE: 1.82 M2
CV BLOOD PRESSURE: ABNORMAL MMHG
CV ECHO HEIGHT: 67.5 IN
CV ECHO WEIGHT: 153 LBS
DOP CALC LVOT PEAK VEL: 94.7 CM/S
DOP CALC MV VTI: 20.5 CM
DOP CALCLVOT PEAK VEL VTI: 19 CM
EJECTION FRACTION: 73 % (ref 55–75)
LA AREA 1: 10.4 CM2
LA AREA 2: 10 CM2
LEFT ATRIUM LENGTH: 3.33 CM
LEFT ATRIUM VOLUME INDEX: 14.6 ML/M2
LEFT ATRIUM VOLUME: 26.5 ML
LEFT VENTRICLE DIASTOLIC VOLUME INDEX: 22.5 CM3/M2 (ref 29–61)
LEFT VENTRICLE DIASTOLIC VOLUME: 41 CM3 (ref 46–106)
LEFT VENTRICLE HEART RATE: 74 BPM
LEFT VENTRICLE SYSTOLIC VOLUME INDEX: 6 CM3/M2 (ref 8–24)
LEFT VENTRICLE SYSTOLIC VOLUME: 11 CM3 (ref 14–42)
LEFT VENTRICULAR OUTFLOW TRACT MEAN GRADIENT: 2 MMHG
LEFT VENTRICULAR OUTFLOW TRACT MEAN VELOCITY: 63.4 CM/S
LEFT VENTRICULAR OUTFLOW TRACT PEAK GRADIENT: 4 MMHG
MITRAL VALVE E/A RATIO: 0.7
MITRAL VALVE MEAN INFLOW VELOCITY: 60.9 CM/S
MITRAL VALVE PEAK VELOCITY: 108 CM/S
MV AVERAGE E/E' RATIO: 7.8 CM/S
MV DECELERATION TIME: 384 MS
MV E'TISSUE VEL-LAT: 9.55 CM/S
MV E'TISSUE VEL-MED: 7.8 CM/S
MV LATERAL E/E' RATIO: 7.1
MV MEAN GRADIENT: 2 MMHG
MV MEDIAL E/E' RATIO: 8.7
MV PEAK A VELOCITY: 95.3 CM/S
MV PEAK E VELOCITY: 67.6 CM/S
MV PEAK GRADIENT: 4.7 MMHG
NUC REST DIASTOLIC VOLUME INDEX: 2448 LBS
NUC REST SYSTOLIC VOLUME INDEX: 67.5 IN
TRICUSPID REGURGITATION PEAK PRESSURE GRADIENT: 21.7 MMHG
TRICUSPID VALVE ANULAR PLANE SYSTOLIC EXCURSION: 1.6 CM
TRICUSPID VALVE PEAK REGURGITANT VELOCITY: 233 CM/S

## 2019-04-04 ASSESSMENT — MIFFLIN-ST. JEOR: SCORE: 1229.56

## 2019-04-10 ENCOUNTER — TRANSFERRED RECORDS (OUTPATIENT)
Dept: HEALTH INFORMATION MANAGEMENT | Facility: CLINIC | Age: 74
End: 2019-04-10

## 2019-04-10 ENCOUNTER — RECORDS - HEALTHEAST (OUTPATIENT)
Dept: ADMINISTRATIVE | Facility: OTHER | Age: 74
End: 2019-04-10

## 2019-05-04 ENCOUNTER — COMMUNICATION - HEALTHEAST (OUTPATIENT)
Dept: INTERNAL MEDICINE | Facility: CLINIC | Age: 74
End: 2019-05-04

## 2019-05-04 DIAGNOSIS — F41.1 ANXIETY STATE: ICD-10-CM

## 2019-05-09 ENCOUNTER — HOSPITAL ENCOUNTER (OUTPATIENT)
Dept: MAMMOGRAPHY | Facility: CLINIC | Age: 74
Discharge: HOME | End: 2019-05-09
Attending: INTERNAL MEDICINE

## 2019-05-09 DIAGNOSIS — Z12.31 VISIT FOR SCREENING MAMMOGRAM: ICD-10-CM

## 2019-08-06 ENCOUNTER — COMMUNICATION - HEALTHEAST (OUTPATIENT)
Dept: INTERNAL MEDICINE | Facility: CLINIC | Age: 74
End: 2019-08-06

## 2019-08-06 DIAGNOSIS — I25.10 CAD (CORONARY ARTERY DISEASE): ICD-10-CM

## 2019-08-06 DIAGNOSIS — I51.81 STRESS-INDUCED CARDIOMYOPATHY: ICD-10-CM

## 2019-08-06 DIAGNOSIS — F41.1 ANXIETY STATE: ICD-10-CM

## 2020-02-21 ENCOUNTER — RECORDS - HEALTHEAST (OUTPATIENT)
Dept: ADMINISTRATIVE | Facility: OTHER | Age: 75
End: 2020-02-21

## 2020-02-24 ENCOUNTER — RECORDS - HEALTHEAST (OUTPATIENT)
Dept: ADMINISTRATIVE | Facility: OTHER | Age: 75
End: 2020-02-24

## 2020-02-27 ENCOUNTER — OFFICE VISIT (OUTPATIENT)
Dept: PEDIATRICS | Facility: CLINIC | Age: 75
End: 2020-02-27
Payer: COMMERCIAL

## 2020-02-27 VITALS
WEIGHT: 161 LBS | OXYGEN SATURATION: 97 % | TEMPERATURE: 98.2 F | SYSTOLIC BLOOD PRESSURE: 114 MMHG | DIASTOLIC BLOOD PRESSURE: 74 MMHG | HEART RATE: 79 BPM | BODY MASS INDEX: 24.4 KG/M2 | HEIGHT: 68 IN

## 2020-02-27 DIAGNOSIS — I95.1 ORTHOSTATIC HYPOTENSION: ICD-10-CM

## 2020-02-27 DIAGNOSIS — Z78.0 ASYMPTOMATIC MENOPAUSAL STATE: ICD-10-CM

## 2020-02-27 DIAGNOSIS — M85.89 OSTEOPENIA OF MULTIPLE SITES: ICD-10-CM

## 2020-02-27 DIAGNOSIS — F41.9 ANXIETY: ICD-10-CM

## 2020-02-27 DIAGNOSIS — S52.602P: ICD-10-CM

## 2020-02-27 DIAGNOSIS — I51.81 TAKOTSUBO CARDIOMYOPATHY: ICD-10-CM

## 2020-02-27 DIAGNOSIS — R55 SYNCOPE, UNSPECIFIED SYNCOPE TYPE: ICD-10-CM

## 2020-02-27 DIAGNOSIS — M85.80 OSTEOPENIA, UNSPECIFIED LOCATION: ICD-10-CM

## 2020-02-27 DIAGNOSIS — Z01.818 PREOP GENERAL PHYSICAL EXAM: Primary | ICD-10-CM

## 2020-02-27 PROCEDURE — 99205 OFFICE O/P NEW HI 60 MIN: CPT | Performed by: INTERNAL MEDICINE

## 2020-02-27 PROCEDURE — 93000 ELECTROCARDIOGRAM COMPLETE: CPT | Performed by: INTERNAL MEDICINE

## 2020-02-27 RX ORDER — NAPROXEN 250 MG/1
250 TABLET ORAL 2 TIMES DAILY WITH MEALS
COMMUNITY
End: 2020-07-13

## 2020-02-27 ASSESSMENT — MIFFLIN-ST. JEOR: SCORE: 1274.82
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
## 2020-02-27 NOTE — PATIENT INSTRUCTIONS
Hold aspirin starting now.    I want you to take carvedilol 1/2 tab (6.25mg) twice daily. Check your blood pressure twice per day for the next few days and call us with this information, we may decrease the dose further.     You will need to schedule a heart ultrasound, heart monitor (for 2 weeks), and ultrasound of your neck arteries to make sure we aren't missing the cause of this episode. You also need a bone density scan to see if you have developed osteoporosis.    I want you to follow-up with me after you have done these.    Before Your Surgery      Call your surgeon if there is any change in your health. This includes signs of a cold or flu (such as a sore throat, runny nose, cough, rash or fever).    Do not smoke, drink alcohol or take over the counter medicine (unless your surgeon or primary care doctor tells you to) for the 24 hours before and after surgery.    If you take prescribed drugs: Follow your doctor s orders about which medicines to take and which to stop until after surgery.    Eating and drinking prior to surgery: follow the instructions from your surgeon    Take a shower or bath the night before surgery. Use the soap your surgeon gave you to gently clean your skin. If you do not have soap from your surgeon, use your regular soap. Do not shave or scrub the surgery site.  Wear clean pajamas and have clean sheets on your bed.

## 2020-02-27 NOTE — Clinical Note
Sara, can you call this patient next week and check on her BPs after switching carvedilol to 6.25mg BID? If systolic BPs are still <110, then we need to decrease the dose to 3.125mg BID. Thanks!

## 2020-03-03 ENCOUNTER — TELEPHONE (OUTPATIENT)
Dept: PEDIATRICS | Facility: CLINIC | Age: 75
End: 2020-03-03

## 2020-03-03 NOTE — TELEPHONE ENCOUNTER
Called pt at [phone]. Spouse says that pt is at [facility] getting the procedure done(treatment for wrist fracture) now. He says that she haven't been complaining of dizziness or low BP this week. He would like us to call her back later this week.    Will postpone to Friday to check on her.     Notes from  on 2/27:  Sara, can you call this patient next week and check on her BPs after switching carvedilol to 6.25mg BID? If systolic BPs are still <110, then we need to decrease the dose to 3.125mg BID. Thanks!     Notes from OV on 2/27:  Given patient's hx of Takutsubo's cardiomyopathy and recent episode of syncope, did discuss with on-call cardiologist to make sure patient did not require further cardiac clearance prior to surgery. As today's ECG is reassuring, cardiology felt that patient could safely proceed with surgery, but did recommend further work-up for syncopal episode. Will order TTE, 2-week Holter monitor, and carotid US. Given positive orthostatics here in clinic (this could also be cause of her syncopal episode) will have patient take carvedilol 6.25mg BID instead of the 12.5mg daily she is currently taking. She will follow her blood pressures closely and call us with these early next week. If still trending SBPs <110, will have her further decrease to 3.125mg BID.     BP Readings from Last 4 Encounters:   02/27/20 114/74   04/18/18 106/57     Jeffrey, RN  Triage Nurse

## 2020-03-09 ENCOUNTER — HOSPITAL ENCOUNTER (OUTPATIENT)
Dept: CARDIOLOGY | Facility: CLINIC | Age: 75
Discharge: HOME | End: 2020-03-09
Attending: INTERNAL MEDICINE | Admitting: INTERNAL MEDICINE
Payer: COMMERCIAL

## 2020-03-09 ENCOUNTER — HOSPITAL ENCOUNTER (OUTPATIENT)
Dept: ULTRASOUND IMAGING | Facility: CLINIC | Age: 75
Discharge: HOME | End: 2020-03-09
Attending: INTERNAL MEDICINE | Admitting: INTERNAL MEDICINE
Payer: COMMERCIAL

## 2020-03-09 DIAGNOSIS — R55 SYNCOPE, UNSPECIFIED SYNCOPE TYPE: ICD-10-CM

## 2020-03-09 PROCEDURE — 0298T ZIO PATCH HOLTER ADULT PEDIATRIC GREATER THAN 48 HRS: CPT | Performed by: INTERNAL MEDICINE

## 2020-03-09 PROCEDURE — 93880 EXTRACRANIAL BILAT STUDY: CPT

## 2020-03-09 PROCEDURE — 0296T ZIO PATCH HOLTER ADULT PEDIATRIC GREATER THAN 48 HRS: CPT

## 2020-03-09 NOTE — LETTER
April 1, 2020      Kandis Tse  [address]  [place] [address]        Dear Kandis,     Your heart monitor does show that you had a couple episodes of irregular heart beats. At this point, I think that it would be reasonable to continue on your current medications, and you can address with your cardiologist when you see them later this month whether anything needs to be done about these (further testing or starting on additional medication). Please let me know if you have any concerns or questions!     Sincerely,     Yoko Conde MD   Internal Medicine-Pediatrics

## 2020-03-10 NOTE — TELEPHONE ENCOUNTER
Pt called back. Dizziness has subsided, dines any new concerning sx's. Wrist procedure went well, recovering as planned. Got Holter & carotid US done. Will schedule to get ECHO done. Has been checking BP twice a day, same time around & same arm.     BP readings:  Date:  AM   PM  3/6  129/84 - 87  127/72 - 65  3/7  151/98 - 60  153/96 - 80  3/8  155/98 - 80  120/68 - 80  3/9  145/86 - 80  145/89 - 65  3/10  127/78 - 90    Please review BP readings & advise further. Thanks.     Jeffrey, RN  Triage Nurse

## 2020-03-10 NOTE — TELEPHONE ENCOUNTER
Called pt back to check on her BP, not available, unable to LM because of no VMB option available. Will try later.    Jeffrey, RN  Triage Nurse

## 2020-03-11 NOTE — TELEPHONE ENCOUNTER
Called pt & advised as below. Pt agrees to the plan. No other questions/concerns from pt at this time.    Jeffrey, RN  Triage Nurse

## 2020-03-11 NOTE — TELEPHONE ENCOUNTER
Let's have her continue on current dose of carvedilol. She should keep track of her blood pressures and bring them to clinic when she comes in to see Cards on 3-20 as scheduled, if she's still running high they can potentially increase her carvedilol at that point if they feel it's appropriate.    Yoko Conde MD  Internal Medicine-Pediatrics

## 2020-03-17 ENCOUNTER — TELEPHONE (OUTPATIENT)
Dept: PEDIATRICS | Facility: CLINIC | Age: 75
End: 2020-03-17

## 2020-03-17 NOTE — TELEPHONE ENCOUNTER
Patient called and is scheduled for Echocardiogram on Friday. Wondering if it is urgent that she come in this week or can this be postponed until after ALONZO baldwin.  Lynn Mortensen LPN

## 2020-03-18 ENCOUNTER — TELEPHONE (OUTPATIENT)
Dept: CARDIOLOGY | Facility: CLINIC | Age: 75
End: 2020-03-18

## 2020-03-18 NOTE — TELEPHONE ENCOUNTER
As long as she is feeling better and not passing out, fine to postpone echocardiogram for 1-3 months.    Yoko Conde MD  Internal Medicine-Pediatrics

## 2020-03-18 NOTE — TELEPHONE ENCOUNTER
Called and notified patient. She will postpone echocardiogram.    Vianney Infante MA 9:00 AM 3/18/2020

## 2020-03-18 NOTE — TELEPHONE ENCOUNTER
Lisa from [facility] ([phone]) called in regarding Echo, if it should still be done this Friday or if it can be scheduled out.    LVM with information, okay to postpone for 1 month and that patient has been notified.    Thanks  Laith NUNEZ  Team Coodinator

## 2020-03-18 NOTE — TELEPHONE ENCOUNTER
APPOINTMENT RESCHEDULING NOTE    Procedure  Procedure to be rescheduled: Echocardiogram  Ordering provider name: Yoko Conde   Ordering provider phone number:   Ordering provider confirmation to reschedule after April 1st 2020: Yes    Instructions for Patient  Patient informed to call the ordering provider if symptoms worsen or they have concerns.     Scheduling Phone Numbers  Cardiopulmonary Scheduling Phone Number: [phone]        Concerns of COVID  Patient informed to call [facility] (virtual visit) if they are concerned about COVID symptoms (fever, rash, cough, shortness of breath) or exposure.      [facility] instructions:  1. Go to the website [email or web address]  2. Create an account (you will need your insurance information)  3. Start a new visit  4. Choose your diagnosis (e.g. COVID19)  5. Fill out the information about your symptoms  6. A provider will reach out to you by text, phone call or video visit based on your request

## 2020-03-22 ENCOUNTER — HEALTH MAINTENANCE LETTER (OUTPATIENT)
Age: 75
End: 2020-03-22

## 2020-03-23 ENCOUNTER — RECORDS - HEALTHEAST (OUTPATIENT)
Dept: ADMINISTRATIVE | Facility: OTHER | Age: 75
End: 2020-03-23

## 2020-04-13 ENCOUNTER — TELEPHONE (OUTPATIENT)
Dept: CARDIOLOGY | Facility: CLINIC | Age: 75
End: 2020-04-13

## 2020-04-13 NOTE — TELEPHONE ENCOUNTER
APPOINTMENT RESCHEDULING NOTE    Procedure  Procedure to be rescheduled: Echocardiogram  Ordering provider name: Yoko Conde  Ordering provider phone number:   Ordering provider confirmation to reschedule/guidance with timeframe to reschedule: Yes 1-3 months if pt is asymptomatic    Instructions for Patient  Patient informed to call the ordering provider if symptoms worsen or they have concerns.     Scheduling Phone Numbers  Central Scheduling (Radiology) Phone Number: [phone]  Cardiology Scheduling Phone Number: [phone]    Concerns of COVID  Patient informed to call [facility] (virtual visit) if they are concerned about COVID symptoms (fever, rash, cough, shortness of breath) or exposure.      [facility] instructions:  1. Go to the website [email or web address]  2. Create an account (you will need your insurance information)  3. Start a new visit  4. Choose your diagnosis (e.g. COVID19)  5. Fill out the information about your symptoms  6. A provider will reach out to you by text, phone call or video visit based on your request

## 2020-04-20 ENCOUNTER — RECORDS - HEALTHEAST (OUTPATIENT)
Dept: ADMINISTRATIVE | Facility: OTHER | Age: 75
End: 2020-04-20

## 2020-05-02 ENCOUNTER — COMMUNICATION - HEALTHEAST (OUTPATIENT)
Dept: INTERNAL MEDICINE | Facility: CLINIC | Age: 75
End: 2020-05-02

## 2020-05-02 DIAGNOSIS — R52 PAIN: ICD-10-CM

## 2020-06-01 NOTE — TELEPHONE ENCOUNTER
Pt calling to notify that she would like to proceed with ECHO at this time. Provided cardiology scheduling number to pt to schedule that appointment.     Jeffrey, RN  Triage Nurse

## 2020-06-12 ENCOUNTER — HOSPITAL ENCOUNTER (OUTPATIENT)
Dept: CARDIOLOGY | Facility: CLINIC | Age: 75
Discharge: HOME | End: 2020-06-12
Attending: INTERNAL MEDICINE | Admitting: INTERNAL MEDICINE
Payer: COMMERCIAL

## 2020-06-12 DIAGNOSIS — R55 SYNCOPE, UNSPECIFIED SYNCOPE TYPE: ICD-10-CM

## 2020-06-12 DIAGNOSIS — I51.81 TAKOTSUBO CARDIOMYOPATHY: ICD-10-CM

## 2020-06-12 PROCEDURE — 93306 TTE W/DOPPLER COMPLETE: CPT | Mod: 26 | Performed by: INTERNAL MEDICINE

## 2020-06-12 PROCEDURE — 93306 TTE W/DOPPLER COMPLETE: CPT

## 2020-07-06 ENCOUNTER — TELEPHONE (OUTPATIENT)
Dept: PEDIATRICS | Facility: CLINIC | Age: 75
End: 2020-07-06

## 2020-07-06 NOTE — TELEPHONE ENCOUNTER
She left a message on the [facility] voicemail to get Echo results and talk about scheduling Wellness visit. Left message for patient to call back. Aundrea Vogel, RN on 7/6/2020 at 3:57 PM      Dear Kandis,     Your heart ultrasound looks good. The squeezing function of your heart is slightly down from normal (usually normal is 60-65%, yours is 55-60%, but this is a very minor difference). Otherwise this is an overall very reassuring study. I hope that you are feeling better with the medication adjustments that we made. If not, please let me know!     Please let me know if you have any questions or concerns.     Best,     Yoko Cnode MD   Internal Medicine-Pediatrics

## 2020-07-06 NOTE — LETTER
[facility] [place]  [address]  [address]  [place] [address]  Phone: [phone]  Fax: [phone]    07/07/20    Kandis Tse  [address] [place] [address]  [place] [address]      Dear Kandis,     Your heart ultrasound looks good. The squeezing function of your heart is slightly down from normal (usually normal is 60-65%, yours is 55-60%, but this is a very minor difference). Otherwise this is an overall very reassuring study. I hope that you are feeling better with the medication adjustments that we made. If not, please let me know!     Please let me know if you have any questions or concerns.     Best,     Yoko Conde MD   Internal Medicine-Pediatrics

## 2020-07-07 NOTE — TELEPHONE ENCOUNTER
Gave her message form Dr. Conde's result note from the echo. She wonders about the medication in combination with the heat making her lightheaded when she gets up from sitting. It is better than what it was, but she would like to discuss.     She has a barking cough that she has had for a long time, a couple years. Would like to discuss that too. Set up appt.Aundrea Vogel RN on 7/7/2020 at 12:53 PM

## 2020-07-12 ENCOUNTER — COMMUNICATION - HEALTHEAST (OUTPATIENT)
Dept: INTERNAL MEDICINE | Facility: CLINIC | Age: 75
End: 2020-07-12

## 2020-07-12 DIAGNOSIS — R52 PAIN: ICD-10-CM

## 2020-07-13 ENCOUNTER — OFFICE VISIT (OUTPATIENT)
Dept: PEDIATRICS | Facility: CLINIC | Age: 75
End: 2020-07-13
Payer: COMMERCIAL

## 2020-07-13 DIAGNOSIS — I51.81 TAKOTSUBO CARDIOMYOPATHY: ICD-10-CM

## 2020-07-13 DIAGNOSIS — Z00.00 MEDICARE ANNUAL WELLNESS VISIT, SUBSEQUENT: Primary | ICD-10-CM

## 2020-07-13 DIAGNOSIS — Z78.0 ASYMPTOMATIC MENOPAUSAL STATE: ICD-10-CM

## 2020-07-13 DIAGNOSIS — F39 MOOD DISORDER (H): ICD-10-CM

## 2020-07-13 DIAGNOSIS — Z12.31 ENCOUNTER FOR SCREENING MAMMOGRAM FOR BREAST CANCER: ICD-10-CM

## 2020-07-13 DIAGNOSIS — G89.29 CHRONIC MIDLINE LOW BACK PAIN WITHOUT SCIATICA: ICD-10-CM

## 2020-07-13 DIAGNOSIS — M54.50 CHRONIC MIDLINE LOW BACK PAIN WITHOUT SCIATICA: ICD-10-CM

## 2020-07-13 PROCEDURE — G0439 PPPS, SUBSEQ VISIT: HCPCS | Mod: 95 | Performed by: INTERNAL MEDICINE

## 2020-07-13 RX ORDER — CITALOPRAM HYDROBROMIDE 20 MG/1
20 TABLET ORAL DAILY
Qty: 90 TABLET | Refills: 3 | Status: SHIPPED | OUTPATIENT
Start: 2020-07-13 | End: 2021-09-24

## 2020-07-13 RX ORDER — CARVEDILOL 6.25 MG/1
6.25 TABLET ORAL 2 TIMES DAILY WITH MEALS
Qty: 180 TABLET | Refills: 3 | Status: SHIPPED | OUTPATIENT
Start: 2020-07-13 | End: 2021-08-19

## 2020-07-13 RX ORDER — NAPROXEN 250 MG/1
250 TABLET ORAL 2 TIMES DAILY PRN
Qty: 180 TABLET | Refills: 1 | Status: SHIPPED | OUTPATIENT
Start: 2020-07-13 | End: 2021-12-16
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
## 2020-07-13 NOTE — PROGRESS NOTES
Called and spoke with patient.  Patient is scheduled for Dexa scan, labs, and mammogram 08/5/20.      Tatianna Lott

## 2020-07-13 NOTE — PATIENT INSTRUCTIONS
Medications sent in.    Schedule an appointment for lab work, bone density scan, and mammogram.     Ask your pharmacist if you can get tetanus booster and Pneumovax vaccines.     You could try an acid reflux medication for the cough - over the counter famotidine or Pepcid would be an ok place to start.

## 2020-08-05 ENCOUNTER — ANCILLARY PROCEDURE (OUTPATIENT)
Dept: BONE DENSITY | Facility: CLINIC | Age: 75
End: 2020-08-05
Attending: INTERNAL MEDICINE
Payer: COMMERCIAL

## 2020-08-05 ENCOUNTER — ANCILLARY PROCEDURE (OUTPATIENT)
Dept: MAMMOGRAPHY | Facility: CLINIC | Age: 75
End: 2020-08-05
Attending: INTERNAL MEDICINE
Payer: COMMERCIAL

## 2020-08-05 DIAGNOSIS — Z00.00 MEDICARE ANNUAL WELLNESS VISIT, SUBSEQUENT: ICD-10-CM

## 2020-08-05 DIAGNOSIS — Z78.0 ASYMPTOMATIC MENOPAUSAL STATE: ICD-10-CM

## 2020-08-05 DIAGNOSIS — Z12.31 ENCOUNTER FOR SCREENING MAMMOGRAM FOR BREAST CANCER: ICD-10-CM

## 2020-08-05 DIAGNOSIS — I51.81 TAKOTSUBO CARDIOMYOPATHY: ICD-10-CM

## 2020-08-05 LAB
ALBUMIN SERPL-MCNC: 4 G/DL (ref 3.4–5)
ALP SERPL-CCNC: 85 U/L (ref 40–150)
ALT SERPL W P-5'-P-CCNC: 60 U/L (ref 0–50)
ANION GAP SERPL CALCULATED.3IONS-SCNC: 7 MMOL/L (ref 3–14)
AST SERPL W P-5'-P-CCNC: 61 U/L (ref 0–45)
BILIRUB SERPL-MCNC: 0.8 MG/DL (ref 0.2–1.3)
BUN SERPL-MCNC: 20 MG/DL (ref 7–30)
CALCIUM SERPL-MCNC: 9.4 MG/DL (ref 8.5–10.1)
CHLORIDE SERPL-SCNC: 105 MMOL/L (ref 94–109)
CHOLEST SERPL-MCNC: 233 MG/DL
CO2 SERPL-SCNC: 28 MMOL/L (ref 20–32)
CREAT SERPL-MCNC: 0.75 MG/DL (ref 0.52–1.04)
GFR SERPL CREATININE-BSD FRML MDRD: 78 ML/MIN/{1.73_M2}
GLUCOSE SERPL-MCNC: 91 MG/DL (ref 70–99)
HDLC SERPL-MCNC: 131 MG/DL
LDLC SERPL CALC-MCNC: 91 MG/DL
NONHDLC SERPL-MCNC: 102 MG/DL
POTASSIUM SERPL-SCNC: 4.3 MMOL/L (ref 3.4–5.3)
PROT SERPL-MCNC: 7.7 G/DL (ref 6.8–8.8)
SODIUM SERPL-SCNC: 140 MMOL/L (ref 133–144)
TRIGL SERPL-MCNC: 57 MG/DL

## 2020-08-05 PROCEDURE — 80061 LIPID PANEL: CPT | Performed by: INTERNAL MEDICINE

## 2020-08-05 PROCEDURE — 77067 SCR MAMMO BI INCL CAD: CPT | Mod: TC

## 2020-08-05 PROCEDURE — 36415 COLL VENOUS BLD VENIPUNCTURE: CPT | Performed by: INTERNAL MEDICINE

## 2020-08-05 PROCEDURE — 80053 COMPREHEN METABOLIC PANEL: CPT | Performed by: INTERNAL MEDICINE

## 2020-08-05 PROCEDURE — 77080 DXA BONE DENSITY AXIAL: CPT | Performed by: FAMILY MEDICINE

## 2020-10-16 ENCOUNTER — TELEPHONE (OUTPATIENT)
Dept: PEDIATRICS | Facility: CLINIC | Age: 75
End: 2020-10-16

## 2020-10-16 ENCOUNTER — OFFICE VISIT (OUTPATIENT)
Dept: PEDIATRICS | Facility: CLINIC | Age: 75
End: 2020-10-16
Payer: COMMERCIAL

## 2020-10-16 ENCOUNTER — ANCILLARY PROCEDURE (OUTPATIENT)
Dept: GENERAL RADIOLOGY | Facility: CLINIC | Age: 75
End: 2020-10-16
Attending: PHYSICIAN ASSISTANT
Payer: COMMERCIAL

## 2020-10-16 VITALS
BODY MASS INDEX: 24.41 KG/M2 | SYSTOLIC BLOOD PRESSURE: 114 MMHG | HEART RATE: 72 BPM | HEIGHT: 68 IN | WEIGHT: 161.1 LBS | TEMPERATURE: 98.3 F | DIASTOLIC BLOOD PRESSURE: 62 MMHG | OXYGEN SATURATION: 96 %

## 2020-10-16 DIAGNOSIS — M25.552 HIP PAIN, LEFT: ICD-10-CM

## 2020-10-16 DIAGNOSIS — M76.02 GLUTEAL TENDONITIS OF LEFT BUTTOCK: ICD-10-CM

## 2020-10-16 DIAGNOSIS — R74.8 ELEVATED LIVER ENZYMES: ICD-10-CM

## 2020-10-16 DIAGNOSIS — M16.12 PRIMARY OSTEOARTHRITIS OF LEFT HIP: ICD-10-CM

## 2020-10-16 DIAGNOSIS — M25.552 HIP PAIN, LEFT: Primary | ICD-10-CM

## 2020-10-16 DIAGNOSIS — R74.8 ELEVATED LIVER ENZYMES: Primary | ICD-10-CM

## 2020-10-16 PROCEDURE — 36415 COLL VENOUS BLD VENIPUNCTURE: CPT | Performed by: INTERNAL MEDICINE

## 2020-10-16 PROCEDURE — 99214 OFFICE O/P EST MOD 30 MIN: CPT | Performed by: PHYSICIAN ASSISTANT

## 2020-10-16 PROCEDURE — 73502 X-RAY EXAM HIP UNI 2-3 VIEWS: CPT | Performed by: RADIOLOGY

## 2020-10-16 PROCEDURE — 80076 HEPATIC FUNCTION PANEL: CPT | Performed by: INTERNAL MEDICINE

## 2020-10-16 ASSESSMENT — MIFFLIN-ST. JEOR: SCORE: 1275.27

## 2020-10-16 NOTE — LETTER
October 19, 2020      Kandis Tse  [address]  [place] [address]        Kandis:     Your repeat liver function tests have returned to normal. No additional testing is needed at this time.     Please feel free to contact me if you have any questions or concerns.       Khanh Rudolph MD     Resulted Orders   Hepatic panel (Albumin, ALT, AST, Bili, Alk Phos, TP)   Result Value Ref Range    Bilirubin Direct 0.2 0.0 - 0.2 mg/dL    Bilirubin Total 0.6 0.2 - 1.3 mg/dL    Albumin 3.9 3.4 - 5.0 g/dL    Protein Total 7.2 6.8 - 8.8 g/dL    Alkaline Phosphatase 81 40 - 150 U/L    ALT 42 0 - 50 U/L    AST 32 0 - 45 U/L

## 2020-10-16 NOTE — TELEPHONE ENCOUNTER
Pt LM yesterday(10/15) at 1:03 pm requesting a call back at [phone].     Pt had a phone visit for her routine px on 7/13. Called pt back to get details.     Musculoskeletal problem/pain  Onset/Duration: several weeks  Description  Location: Hip & Groin - Left  Joint Swelling: no  Redness: no  Pain: YES- shooting pain while walking up the stairs & bending to lift anything from the ground. Pain radiates to L leg as well.  Warmth: no  Intensity:  moderate for the most part, severe with activity  Progression of Symptoms:  Intermittent. Has been mild to moderate pain for the most part. Lately its getting painful to even walk short distances.    Accompanying signs and symptoms:   Fevers: no  Numbness/tingling/weakness: no  History  Trauma to the area: No  Recent illness:  no  Previous similar problem: no  Previous evaluation:  no  Precipitating or alleviating factors:  Aggravating factors include: climbing stairs, bending at hip, lifting things from the ground & walking long distances  Therapies tried and outcome: OTC pain meds, elevate, rest.    Denies fever, chills, fall/injury, trouble urination, saddle pain, UTI sx's or incontinence. Would like to be seen asap.     Scheduled an OV with Erica for an eval. Offered am appointment but pt has to drive her spouse for an appointment this morning.     Jeffrey, RN  Triage Nurse

## 2020-10-16 NOTE — PROGRESS NOTES
"Subjective     Kandis Tse is a 74 year old female who presents to clinic today for the following health issues:    HPI         Musculoskeletal problem/pain  Onset/Duration: x several months   Description  Location: hip - left radiates to left knee  Joint Swelling: no  Redness: no  Pain: YES  Warmth: no  Intensity:  Was tolerable, now severe  Progression of Symptoms:  worsening  Accompanying signs and symptoms:   Fevers: no  Numbness/tingling/weakness: no  History  Trauma to the area: no  Recent illness:  no  Previous similar problem: no  Previous evaluation:  no  Precipitating or alleviating factors:  Aggravating factors include: walking and climbing stairs  Therapies tried and outcome: Ibuprofen and aleve, fixing posture, not effective.     Review of Systems   Constitutional, HEENT, cardiovascular, pulmonary, gi and gu systems are negative, except as otherwise noted.      Objective    /62 (BP Location: Right arm, Patient Position: Sitting, Cuff Size: Adult Regular)   Pulse 72   Temp 98.3  F (36.8  C) (Tympanic)   Ht 1.721 m (5' 7.75\")   Wt 73.1 kg (161 lb 1.6 oz)   SpO2 96%   BMI 24.68 kg/m    Body mass index is 24.68 kg/m .  Physical Exam   ORTHO: Hip Exam: LEFT  Palpation: tender over the left gluteal insertion, left groin  Range of Motion:  Left Hip: pain with eversion   Strength:  full strength    Xray - reveals moderate OA        Assessment & Plan   Hip pain, left  - XR Hip Left 2-3 Views; Future  - [facility]; Future    Primary osteoarthritis of left hip  Referral to ortho to discuss treatment options.  - Orthopedic & Spine  Referral; Future    Gluteal tendonitis of left buttock  Begin physical therapy.    Elevated liver enzymes  - Hepatic panel (Albumin, ALT, AST, Bili, Alk Phos, TP)      BARRON Perez [facility] [place]    "

## 2020-10-17 LAB
ALBUMIN SERPL-MCNC: 3.9 G/DL (ref 3.4–5)
ALP SERPL-CCNC: 81 U/L (ref 40–150)
ALT SERPL W P-5'-P-CCNC: 42 U/L (ref 0–50)
AST SERPL W P-5'-P-CCNC: 32 U/L (ref 0–45)
BILIRUB DIRECT SERPL-MCNC: 0.2 MG/DL (ref 0–0.2)
BILIRUB SERPL-MCNC: 0.6 MG/DL (ref 0.2–1.3)
PROT SERPL-MCNC: 7.2 G/DL (ref 6.8–8.8)

## 2020-10-21 ENCOUNTER — OFFICE VISIT (OUTPATIENT)
Dept: ORTHOPEDICS | Facility: CLINIC | Age: 75
End: 2020-10-21
Payer: COMMERCIAL

## 2020-10-21 VITALS
DIASTOLIC BLOOD PRESSURE: 60 MMHG | BODY MASS INDEX: 24.4 KG/M2 | HEIGHT: 68 IN | SYSTOLIC BLOOD PRESSURE: 110 MMHG | WEIGHT: 161 LBS

## 2020-10-21 DIAGNOSIS — M16.12 PRIMARY LOCALIZED OSTEOARTHRITIS OF LEFT HIP: Primary | ICD-10-CM

## 2020-10-21 PROCEDURE — 99204 OFFICE O/P NEW MOD 45 MIN: CPT | Performed by: FAMILY MEDICINE

## 2020-10-21 ASSESSMENT — MIFFLIN-ST. JEOR: SCORE: 1269.82

## 2020-10-21 NOTE — PATIENT INSTRUCTIONS
1. Primary localized osteoarthritis of left hip      -Patient has left hip pain due to arthritis  -Patient will stop Aleve.  Patient will start Voltaren gel to be applied to the left hip 2-3 times a day as needed.  Patient may also take 1 to 2 tablets of extra strength Tylenol as needed  -Patient is already scheduled to start formal physical therapy tomorrow at the [facility]  -Patient will follow up if pain does not improve for possible intra-articular cortisone injection.   -Call direct clinic number [[phone]] at any time with questions or concerns.    Albert Yeo MD CAQSM  [facility]  [facility] [place]

## 2020-10-21 NOTE — PROGRESS NOTES
ASSESSMENT & PLAN  Patient Instructions     1. Primary localized osteoarthritis of left hip      -Patient has left hip pain due to arthritis  -Patient will stop Aleve.  Patient will start Voltaren gel to be applied to the left hip 2-3 times a day as needed.  Patient may also take 1 to 2 tablets of extra strength Tylenol as needed  -Patient is already scheduled to start formal physical therapy tomorrow at the [facility] for Athletic medicine  -Patient will follow up if pain does not improve for possible intra-articular cortisone injection.   -Call direct clinic number [[phone]] at any time with questions or concerns.    Albert Yeo MD [facility]  [facility]          -----    SUBJECTIVE  Kandis Tse is a/an 75 year old female who is seen in consultation at the request of  Vera Redd PA-C for evaluation of left hip pain. The patient is seen by themselves.    Onset: 6 month(s) ago. Reports insidious onset without acute precipitating event.  Location of Pain: left anterior groin that radiates to lateral hip that goes down to her knee  Rating of Pain at worst: 9/10  Rating of Pain Currently: 3/10  Worsened by: sitting, walking around running errands and not paying attention to posture, sleeping  Better with: better posture, aleve  Treatments tried: ice, Aleve, home exercises and previous imaging (xray 10/16/2020)  Quality: constant aching  Associated symptoms: no distal numbness or tingling; denies swelling or warmth  Orthopedic history: YES - Date: T12 compression fx in 2016  Relevant surgical history: YES - Date: T12 Compression fx  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    Past Medical History:   Diagnosis Date     Anxiety      Breast cancer (H)      Hx of radiation therapy      MI, old      Osteopenia      Peripheral vertigo      PONV (postoperative nausea and vomiting)      Stress-induced  cardiomyopathy      T12 compression fracture (H)      Social History     Socioeconomic History     Marital status:      Spouse name: Not on file     Number of children: Not on file     Years of education: Not on file     Highest education level: Not on file   Occupational History     Not on file   Social Needs     Financial resource strain: Not on file     Food insecurity     Worry: Not on file     Inability: Not on file     Transportation needs     Medical: Not on file     Non-medical: Not on file   Tobacco Use     Smoking status: Former Smoker     Smokeless tobacco: Never Used   Substance and Sexual Activity     Alcohol use: No     Drug use: No     Sexual activity: Yes     Partners: Male   Lifestyle     Physical activity     Days per week: Not on file     Minutes per session: Not on file     Stress: Not on file   Relationships     Social connections     Talks on phone: Not on file     Gets together: Not on file     Attends [employer] service: Not on file     Active member of club or organization: Not on file     Attends meetings of clubs or organizations: Not on file     Relationship status: Not on file     Intimate partner violence     Fear of current or ex partner: Not on file     Emotionally abused: Not on file     Physically abused: Not on file     Forced sexual activity: Not on file   Other Topics Concern     Parent/sibling w/ CABG, MI or angioplasty before 65F 55M? Not Asked   Social History Narrative     Not on file         Patient's past medical, surgical, social, and family histories were reviewed today and no changes are noted.    REVIEW OF SYSTEMS:  10 point ROS is negative other than symptoms noted above in HPI, Past Medical History or as stated below  Constitutional: NEGATIVE for fever, chills, change in weight  Skin: NEGATIVE for worrisome rashes, moles or lesions  GI/: NEGATIVE for bowel or bladder changes  Neuro: NEGATIVE for weakness, dizziness or paresthesias    OBJECTIVE:  /60    "Ht 1.721 m (5' 7.75\")   Wt 73 kg (161 lb)   BMI 24.66 kg/m     General: healthy, alert and in no distress  HEENT: no scleral icterus or conjunctival erythema  Skin: no suspicious lesions or rash. No jaundice.  CV: no pedal edema  Resp: normal respiratory effort without conversational dyspnea   Psych: normal mood and affect  Gait: normal steady gait with appropriate coordination and balance  Neuro: Normal light sensory exam of lower extremity  MSK:  LEFT HIP  Inspection:    No obvious deformity or asymmetry, level pelvis  Palpation:    Tender about the anterior groin/joint line. Otherwise all other landmarks are nontender.  Active Range of Motion:     Flexion limited slightly by pain, IR limited slightly by pain, ER  within normal limits  Strength:    Flexion painful, grossly intact, adduction grossly intact, abduction grossly intact  Special Tests:    Positive: JF, anterior impingement (FADIR)    Negative: Logroll, resisted gluteus medius provocation, posterior impingement (EX/AB/ER)    Independent visualization of the below image:  No results found for this or any previous visit (from the past 24 hour(s)).   HIP LEFT TWO TO THREE VIEWS   10/16/2020 2:17 PM      HISTORY:  Hip pain, left.     FINDINGS: Mild symphysis pubis degenerative change. There may be an  old healed fracture of the left pubic symphysis. Osteopenia suspected.                                                                      IMPRESSION: Left hip osteoarthritis including moderate narrowing  superolaterally. No fracture identified.      ALAN LAORR, MD Albert Yeo MD CAQSM  [facility]    "

## 2020-10-21 NOTE — LETTER
10/21/2020         RE: Kandis Tse  [address]  [place] [address]        Dear Colleague,    Thank you for referring your patient, Kandis Tse, to the [facility] [place]. Please see a copy of my visit note below.    ASSESSMENT & PLAN  Patient Instructions     1. Primary localized osteoarthritis of left hip      -Patient has left hip pain due to arthritis  -Patient will stop Aleve.  Patient will start Voltaren gel to be applied to the left hip 2-3 times a day as needed.  Patient may also take 1 to 2 tablets of extra strength Tylenol as needed  -Patient is already scheduled to start formal physical therapy tomorrow at the [facility] for Athletic medicine  -Patient will follow up if pain does not improve for possible intra-articular cortisone injection.   -Call direct clinic number [[phone]] at any time with questions or concerns.    Albert Yeo MD [facility]  [facility] [place]          -----    SUBJECTIVE  Kandis Tse is a/an 75 year old female who is seen in consultation at the request of  Vera Redd PA-C for evaluation of left hip pain. The patient is seen by themselves.    Onset: 6 month(s) ago. Reports insidious onset without acute precipitating event.  Location of Pain: left anterior groin that radiates to lateral hip that goes down to her knee  Rating of Pain at worst: 9/10  Rating of Pain Currently: 3/10  Worsened by: sitting, walking around running errands and not paying attention to posture, sleeping  Better with: better posture, aleve  Treatments tried: ice, Aleve, home exercises and previous imaging (xray 10/16/2020)  Quality: constant aching  Associated symptoms: no distal numbness or tingling; denies swelling or warmth  Orthopedic history: YES - Date: T12 compression fx in 2016  Relevant surgical history: YES - Date: T12 Compression fx  Social history: social  " I feel short of breath and chest pain when I walk, since Monday " 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    Past Medical History:   Diagnosis Date     Anxiety      Breast cancer (H)      Hx of radiation therapy      MI, old      Osteopenia      Peripheral vertigo      PONV (postoperative nausea and vomiting)      Stress-induced cardiomyopathy      T12 compression fracture (H)      Social History     Socioeconomic History     Marital status:      Spouse name: Not on file     Number of children: Not on file     Years of education: Not on file     Highest education level: Not on file   Occupational History     Not on file   Social Needs     Financial resource strain: Not on file     Food insecurity     Worry: Not on file     Inability: Not on file     Transportation needs     Medical: Not on file     Non-medical: Not on file   Tobacco Use     Smoking status: Former Smoker     Smokeless tobacco: Never Used   Substance and Sexual Activity     Alcohol use: No     Drug use: No     Sexual activity: Yes     Partners: Male   Lifestyle     Physical activity     Days per week: Not on file     Minutes per session: Not on file     Stress: Not on file   Relationships     Social connections     Talks on phone: Not on file     Gets together: Not on file     Attends Jehovah's witness service: Not on file     Active member of club or organization: Not on file     Attends meetings of clubs or organizations: Not on file     Relationship status: Not on file     Intimate partner violence     Fear of current or ex partner: Not on file     Emotionally abused: Not on file     Physically abused: Not on file     Forced sexual activity: Not on file   Other Topics Concern     Parent/sibling w/ CABG, MI or angioplasty before 65F 55M? Not Asked   Social History Narrative     Not on file         Patient's past medical, surgical, social, and family histories were reviewed today and no changes are noted.    REVIEW OF SYSTEMS:  10 point ROS is negative other than symptoms noted above in HPI, Past  "Medical History or as stated below  Constitutional: NEGATIVE for fever, chills, change in weight  Skin: NEGATIVE for worrisome rashes, moles or lesions  GI/: NEGATIVE for bowel or bladder changes  Neuro: NEGATIVE for weakness, dizziness or paresthesias    OBJECTIVE:  /60   Ht 1.721 m (5' 7.75\")   Wt 73 kg (161 lb)   BMI 24.66 kg/m     General: healthy, alert and in no distress  HEENT: no scleral icterus or conjunctival erythema  Skin: no suspicious lesions or rash. No jaundice.  CV: no pedal edema  Resp: normal respiratory effort without conversational dyspnea   Psych: normal mood and affect  Gait: normal steady gait with appropriate coordination and balance  Neuro: Normal light sensory exam of lower extremity  MSK:  LEFT HIP  Inspection:    No obvious deformity or asymmetry, level pelvis  Palpation:    Tender about the anterior groin/joint line. Otherwise all other landmarks are nontender.  Active Range of Motion:     Flexion limited slightly by pain, IR limited slightly by pain, ER  within normal limits  Strength:    Flexion painful, grossly intact, adduction grossly intact, abduction grossly intact  Special Tests:    Positive: JF, anterior impingement (FADIR)    Negative: Logroll, resisted gluteus medius provocation, posterior impingement (EX/AB/ER)    Independent visualization of the below image:  No results found for this or any previous visit (from the past 24 hour(s)).   HIP LEFT TWO TO THREE VIEWS   10/16/2020 2:17 PM      HISTORY:  Hip pain, left.     FINDINGS: Mild symphysis pubis degenerative change. There may be an  old healed fracture of the left pubic symphysis. Osteopenia suspected.                                                                      IMPRESSION: Left hip osteoarthritis including moderate narrowing  superolaterally. No fracture identified.      ALAN LAORR, MD Albert Yeo MD [facility]        Again, thank you for allowing me to " participate in the care of your patient.        Sincerely,        Albert Yeo, MD

## 2020-10-22 ENCOUNTER — THERAPY VISIT (OUTPATIENT)
Dept: PHYSICAL THERAPY | Facility: CLINIC | Age: 75
End: 2020-10-22
Attending: PHYSICIAN ASSISTANT
Payer: COMMERCIAL

## 2020-10-22 DIAGNOSIS — M25.552 HIP PAIN, LEFT: ICD-10-CM

## 2020-10-22 PROCEDURE — 97110 THERAPEUTIC EXERCISES: CPT | Mod: GP | Performed by: PHYSICAL THERAPIST

## 2020-10-22 PROCEDURE — 97161 PT EVAL LOW COMPLEX 20 MIN: CPT | Mod: GP | Performed by: PHYSICAL THERAPIST

## 2020-10-22 ASSESSMENT — ACTIVITIES OF DAILY LIVING (ADL)
WALKING_INITIALLY: SLIGHT DIFFICULTY
HOS_ADL_SCORE(%): 70.31
GOING_DOWN_1_FLIGHT_OF_STAIRS: SLIGHT DIFFICULTY
WALKING_DOWN_STEEP_HILLS: SLIGHT DIFFICULTY
WALKING_UP_STEEP_HILLS: SLIGHT DIFFICULTY
HOS_ADL_HIGHEST_POTENTIAL_SCORE: 64
HEAVY_WORK: MODERATE DIFFICULTY
LIGHT_TO_MODERATE_WORK: SLIGHT DIFFICULTY
TWISTING/PIVOTING_ON_INVOLVED_LEG: SLIGHT DIFFICULTY
GETTING_INTO_AND_OUT_OF_AN_AVERAGE_CAR: SLIGHT DIFFICULTY
STANDING_FOR_15_MINUTES: SLIGHT DIFFICULTY
STEPPING_UP_AND_DOWN_CURBS: NO DIFFICULTY AT ALL
ROLLING_OVER_IN_BED: SLIGHT DIFFICULTY
HOS_ADL_COUNT: 16
DEEP_SQUATTING: SLIGHT DIFFICULTY
HOS_ADL_ITEM_SCORE_TOTAL: 45
RECREATIONAL_ACTIVITIES: MODERATE DIFFICULTY
WALKING_15_MINUTES_OR_GREATER: MODERATE DIFFICULTY
WALKING_APPROXIMATELY_10_MINUTES: MODERATE DIFFICULTY
GOING_UP_1_FLIGHT_OF_STAIRS: SLIGHT DIFFICULTY
SITTING_FOR_15_MINUTES: SLIGHT DIFFICULTY
PUTTING_ON_SOCKS_AND_SHOES: NO DIFFICULTY AT ALL
HOW_WOULD_YOU_RATE_YOUR_CURRENT_LEVEL_OF_FUNCTION_DURING_YOUR_USUAL_ACTIVITIES_OF_DAILY_LIVING_FROM_0_TO_100_WITH_100_BEING_YOUR_LEVEL_OF_FUNCTION_PRIOR_TO_YOUR_HIP_PROBLEM_AND_0_BEING_THE_INABILITY_TO_PERFORM_ANY_OF_YOUR_USUAL_DAILY_ACTIVITIES?: 50

## 2020-10-22 NOTE — PROGRESS NOTES
[facility] Initial Evaluation  Subjective:  The history is provided by the patient. No  was used.   Patient Health History  Kandis Tse being seen for L hip.     Date of Onset: MD order 10/16/2020.   Problem occurred: unknown   Pain is reported as 4/10 (can increase to 7/10) on pain scale.  General health as reported by patient is excellent.  Pertinent medical history includes: cancer, heart problems, migraines/headaches, osteoporosis and stroke.   Red flags:  None as reported by patient.  Medical allergies: none.   Surgeries include:  Cancer surgery and orthopedic surgery.    Current medications:  Anti-inflammatory and pain medication.    Current occupation is retired.                     Therapist Generated HPI Evaluation  Problem details: Reports insidious onset L groin pain that radiates laterally, down the side of the L leg stopping at the knee. Xray positive for L hip OA. States pain comes and goes, at times limps secondary to pain. Pain increases while ascending stairs, walking, standing, flexing L hip, transferring in/out of car and activity in general. Takes a postural class at the community [place], feels this is helpful. Otherwise, unsure of what helps with the pain.    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         Type of problem:  Left hip.    This is a chronic condition.  Condition occurred with:  Insidious onset.  Where condition occurred: for unknown reasons.  Patient reports pain:  Groin and lateral.  Pain is described as aching and is intermittent.  Pain radiates to:  Thigh. Pain is worse during the day.  Since onset symptoms are gradually worsening.  Associated with: none. Symptoms are exacerbated by ascending stairs, activity, walking, transfers, lying on extremity, sitting, standing and weight bearing  and relieved by rest.  Special tests included:  X-ray.    Barriers include:  Stairs.                        Objective:    Gait:    Gait  Type:  Antalgic   Assistive Devices:  None  Deviations:  Lumbar:  Trunk lean R                                                 Hip Evaluation  Hip PROM:    Flexion: Left: 90 painful   Right: 110        Internal Rotation: Left: 15 painful    Right: wfl  External Rotation: Left: wfl    Right: wfl              Hip Strength:    Flexion:   Left: 4/5   +  Pain:  Right: 4+/5   Pain:                    Extension:  Left: 3+/5  Pain:Right: 4/5    Pain:    Abduction:  Left: 3+/5     Pain:Right: 4+/5    Pain:    Internal Rotation:  Left: 5/5    Pain:Right: 5/5   Pain:  External Rotation:  Left: 5/5   Pain:  Right: 5/5   Pain:  Knee Flexion:  Left: 5/5   Pain:Right: 5/5   Pain:  Knee Extension:  Left: 5/5   Pain:Right: 5/5    Pain:          Hip Palpation:    Left hip tenderness present at:   Greater Trachanter and IT Band  Left hip tenderness not present at:  Gluteus Medius    Functional Testing:            Proprioception:    Stork balance test:   Left:    Unable  Right:  3 sec  % of Uninvolved:                General     ROS    Assessment/Plan:    Patient is a 75 year old female with left side hip complaints.    Patient has the following significant findings with corresponding treatment plan.                Diagnosis 1:  L hip pain  Pain -  hot/cold therapy, manual therapy, education, directional preference exercise and home program  Decreased ROM/flexibility - manual therapy and therapeutic exercise  Decreased strength - therapeutic exercise and therapeutic activities  Impaired balance - neuro re-education and therapeutic activities  Decreased proprioception - neuro re-education and therapeutic activities  Impaired gait - gait training  Impaired muscle performance - neuro re-education  Decreased function - therapeutic activities    Therapy Evaluation Codes:   1) History comprised of:   Personal factors that impact the plan of care:      Age and Time since onset of symptoms.    Comorbidity factors that impact the plan of care  are:      Cancer, Heart problems, Migraines/headaches, Stroke and osteoporosis.     Medications impacting care: Anti-inflammatory and Pain.  2) Examination of Body Systems comprised of:   Body structures and functions that impact the plan of care:      Hip.   Activity limitations that impact the plan of care are:      Sitting, Stairs, Standing and Walking.  3) Clinical presentation characteristics are:   Stable/Uncomplicated.  4) Decision-Making    Low complexity using standardized patient assessment instrument and/or measureable assessment of functional outcome.  Cumulative Therapy Evaluation is: Low complexity.    Previous and current functional limitations:  (See Goal Flow Sheet for this information)    Short term and Long term goals: (See Goal Flow Sheet for this information)     Communication ability:  Patient appears to be able to clearly communicate and understand verbal and written communication and follow directions correctly.  Treatment Explanation - The following has been discussed with the patient:   RX ordered/plan of care  Anticipated outcomes  Possible risks and side effects  This patient would benefit from PT intervention to resume normal activities.   Rehab potential is good.    Frequency:  1 X week, once daily  Duration:  for 8 weeks  Discharge Plan:  Achieve all LTG.  Independent in home treatment program.  Reach maximal therapeutic benefit.    Please refer to the daily flowsheet for treatment today, total treatment time and time spent performing 1:1 timed codes.

## 2020-10-29 ENCOUNTER — THERAPY VISIT (OUTPATIENT)
Dept: PHYSICAL THERAPY | Facility: CLINIC | Age: 75
End: 2020-10-29
Payer: COMMERCIAL

## 2020-10-29 DIAGNOSIS — M25.552 HIP PAIN, LEFT: ICD-10-CM

## 2020-10-29 PROCEDURE — 97110 THERAPEUTIC EXERCISES: CPT | Mod: GP | Performed by: PHYSICAL THERAPIST

## 2020-10-29 PROCEDURE — 97140 MANUAL THERAPY 1/> REGIONS: CPT | Mod: GP | Performed by: PHYSICAL THERAPIST

## 2020-11-05 ENCOUNTER — THERAPY VISIT (OUTPATIENT)
Dept: PHYSICAL THERAPY | Facility: CLINIC | Age: 75
End: 2020-11-05
Payer: COMMERCIAL

## 2020-11-05 DIAGNOSIS — M25.552 HIP PAIN, LEFT: ICD-10-CM

## 2020-11-05 PROCEDURE — 97140 MANUAL THERAPY 1/> REGIONS: CPT | Mod: GP | Performed by: PHYSICAL THERAPIST

## 2020-11-05 PROCEDURE — 97110 THERAPEUTIC EXERCISES: CPT | Mod: GP | Performed by: PHYSICAL THERAPIST

## 2020-11-12 ENCOUNTER — THERAPY VISIT (OUTPATIENT)
Dept: PHYSICAL THERAPY | Facility: CLINIC | Age: 75
End: 2020-11-12
Payer: COMMERCIAL

## 2020-11-12 DIAGNOSIS — M25.552 HIP PAIN, LEFT: ICD-10-CM

## 2020-11-12 PROCEDURE — 97110 THERAPEUTIC EXERCISES: CPT | Mod: GP | Performed by: PHYSICAL THERAPIST

## 2020-11-12 PROCEDURE — 97140 MANUAL THERAPY 1/> REGIONS: CPT | Mod: GP | Performed by: PHYSICAL THERAPIST

## 2020-11-12 ASSESSMENT — ACTIVITIES OF DAILY LIVING (ADL)
WALKING_INITIALLY: SLIGHT DIFFICULTY
WALKING_15_MINUTES_OR_GREATER: MODERATE DIFFICULTY
SITTING_FOR_15_MINUTES: NO DIFFICULTY AT ALL
HEAVY_WORK: MODERATE DIFFICULTY
WALKING_DOWN_STEEP_HILLS: SLIGHT DIFFICULTY
WALKING_APPROXIMATELY_10_MINUTES: SLIGHT DIFFICULTY
ROLLING_OVER_IN_BED: SLIGHT DIFFICULTY
GOING_DOWN_1_FLIGHT_OF_STAIRS: SLIGHT DIFFICULTY
GETTING_INTO_AND_OUT_OF_AN_AVERAGE_CAR: SLIGHT DIFFICULTY
GOING_UP_1_FLIGHT_OF_STAIRS: SLIGHT DIFFICULTY
HOS_ADL_ITEM_SCORE_TOTAL: 46
LIGHT_TO_MODERATE_WORK: SLIGHT DIFFICULTY
DEEP_SQUATTING: SLIGHT DIFFICULTY
STEPPING_UP_AND_DOWN_CURBS: NO DIFFICULTY AT ALL
RECREATIONAL_ACTIVITIES: MODERATE DIFFICULTY
HOS_ADL_HIGHEST_POTENTIAL_SCORE: 64
HOS_ADL_SCORE(%): 71.88
WALKING_UP_STEEP_HILLS: SLIGHT DIFFICULTY
PUTTING_ON_SOCKS_AND_SHOES: NO DIFFICULTY AT ALL
TWISTING/PIVOTING_ON_INVOLVED_LEG: SLIGHT DIFFICULTY
STANDING_FOR_15_MINUTES: SLIGHT DIFFICULTY
HOS_ADL_COUNT: 16

## 2020-11-19 ENCOUNTER — THERAPY VISIT (OUTPATIENT)
Dept: PHYSICAL THERAPY | Facility: CLINIC | Age: 75
End: 2020-11-19
Payer: COMMERCIAL

## 2020-11-19 DIAGNOSIS — M25.552 HIP PAIN, LEFT: ICD-10-CM

## 2020-11-19 PROCEDURE — 97140 MANUAL THERAPY 1/> REGIONS: CPT | Mod: GP | Performed by: PHYSICAL THERAPIST

## 2020-11-19 PROCEDURE — 97112 NEUROMUSCULAR REEDUCATION: CPT | Mod: GP | Performed by: PHYSICAL THERAPIST

## 2020-11-19 PROCEDURE — 97110 THERAPEUTIC EXERCISES: CPT | Mod: GP | Performed by: PHYSICAL THERAPIST

## 2021-01-01 ENCOUNTER — COMMUNICATION - HEALTHEAST (OUTPATIENT)
Dept: INTERNAL MEDICINE | Facility: CLINIC | Age: 76
End: 2021-01-01

## 2021-01-01 DIAGNOSIS — I25.10 CAD (CORONARY ARTERY DISEASE): ICD-10-CM

## 2021-01-01 DIAGNOSIS — I51.81 STRESS-INDUCED CARDIOMYOPATHY: ICD-10-CM

## 2021-02-01 PROBLEM — M25.552 HIP PAIN, LEFT: Status: RESOLVED | Noted: 2020-10-22 | Resolved: 2021-02-01

## 2021-02-01 NOTE — PROGRESS NOTES
Discharge Note    Progress reporting period is from initial evaluation date (please see noted date below) to Nov 19, 2020.  Linked Episodes   Type: Episode: Status: Noted: Resolved: Last update: Updated by:   PHYSICAL THERAPY L hip 10/22/2020 Active 10/22/2020  11/19/2020  9:57 AM Maday Hughes PT      Comments:       Kandis failed to follow up and current status is unknown.  Please see information below for last relevant information on current status.  Patient seen for 5 visits.    SUBJECTIVE  Subjective changes noted by patient:  Hip is better, but the L lateral knee continues to cause pain. Went to [employer] [place] the other day, couldn't stay long because the knee hurt to walk.  .  Current pain level is 3/10(L knee - increased to 8/10 yesterday).     Previous pain level was  4/10(can increase to 7/10).   Changes in function:  Yes (See Goal flowsheet attached for changes in current functional level)  Adverse reaction to treatment or activity: None    OBJECTIVE  Changes noted in objective findings: TrA Contraction: fair, Difficulty with L glute med contraction     ASSESSMENT/PLAN  Diagnosis: L hip pain   Updated problem list and treatment plan:   Pain - HEP  Decreased function - HEP  Impaired muscle performance - HEP  STG/LTGs have been met or progress has been made towards goals:  Yes, please see goal flowsheet for most current information  Assessment of Progress: current status is unknown.    Last current status: Pt is progressing slower than anticipated   Self Management Plans:  HEP  I have re-evaluated this patient and find that the nature, scope, duration and intensity of the therapy is appropriate for the medical condition of the patient.  [place] continues to require the following intervention to meet STG and LTG's:  HEP.    Recommendations:  Discharge with current home program.  Patient to follow up with MD as needed.    Please refer to the daily flowsheet for treatment today, total treatment time  and time spent performing 1:1 timed codes.

## 2021-02-10 ENCOUNTER — IMMUNIZATION (OUTPATIENT)
Dept: NURSING | Facility: CLINIC | Age: 76
End: 2021-02-10
Payer: COMMERCIAL

## 2021-02-10 PROCEDURE — 0001A PR COVID VAC PFIZER DIL RECON 30 MCG/0.3 ML IM: CPT

## 2021-02-10 PROCEDURE — 91300 PR COVID VAC PFIZER DIL RECON 30 MCG/0.3 ML IM: CPT

## 2021-02-12 ENCOUNTER — OFFICE VISIT (OUTPATIENT)
Dept: ORTHOPEDICS | Facility: CLINIC | Age: 76
End: 2021-02-12
Payer: COMMERCIAL

## 2021-02-12 VITALS
DIASTOLIC BLOOD PRESSURE: 78 MMHG | HEIGHT: 68 IN | BODY MASS INDEX: 23.95 KG/M2 | WEIGHT: 158 LBS | SYSTOLIC BLOOD PRESSURE: 122 MMHG

## 2021-02-12 DIAGNOSIS — M16.12 PRIMARY LOCALIZED OSTEOARTHRITIS OF LEFT HIP: Primary | ICD-10-CM

## 2021-02-12 PROCEDURE — 99214 OFFICE O/P EST MOD 30 MIN: CPT | Performed by: FAMILY MEDICINE

## 2021-02-12 RX ORDER — TRAMADOL HYDROCHLORIDE 50 MG/1
50 TABLET ORAL 2 TIMES DAILY PRN
Qty: 28 TABLET | Refills: 0 | Status: SHIPPED | OUTPATIENT
Start: 2021-02-12 | End: 2021-03-05

## 2021-02-12 ASSESSMENT — MIFFLIN-ST. JEOR: SCORE: 1252.24

## 2021-02-12 NOTE — PROGRESS NOTES
"ASSESSMENT & PLAN  Patient Instructions     1. Primary localized osteoarthritis of left hip      -Patient is following up for left hip pain due to arthritis  -Patient reports worsening pain with therapy, Voltaren gel, oral NSAIDs  -Pain is out of proportion for x-ray findings.  I am concerned she has more extensive degenerative changes that we see on x-ray  -Patient will get an MRI for further evaluation.  Your MRI has been ordered. You may go down to the first floor, [address] to schedule the MRI in person, or call [phone] to schedule over the phone. Once you know the date of your MRI, please call my office and schedule a follow-up visit for 2 days after that.  -Patient will start Tramadol 1/2 to full tab at bedtime as needed for severe pain.  -Call direct clinic number [[phone]] at any time with questions or concerns.    Albert Yeo MD [facility]  [facility]          -----    SUBJECTIVE:  Kandis Tse is a 75 year old female who is seen in follow-up for left hip pain due to arthritis.They were last seen 10/21/2020.     Since their last visit reports worsening pain. States pain is now constant, and everything seems to cause pain now.  They indicate that their current pain level is 8/10. They have tried Aleve, other medications: voltaren gel, home exercises, physical therapy (5 visits).      The patient is seen by themselves.    Patient's past medical, surgical, social, and family histories were reviewed today and no changes are noted.    REVIEW OF SYSTEMS:  Constitutional: NEGATIVE for fever, chills, change in weight  Skin: NEGATIVE for worrisome rashes, moles or lesions  GI/: NEGATIVE for bowel or bladder changes  Neuro: NEGATIVE for weakness, dizziness or paresthesias    OBJECTIVE:  /78   Ht 1.715 m (5' 7.5\")   Wt 71.7 kg (158 lb)   BMI 24.38 kg/m     General: healthy, alert and in no distress  HEENT: no scleral icterus or " conjunctival erythema  Skin: no suspicious lesions or rash. No jaundice.  CV: regular rhythm by palpation, no pedal edema  Resp: normal respiratory effort without conversational dyspnea   Psych: normal mood and affect  Gait: normal steady gait with appropriate coordination and balance  Neuro: normal light touch sensory exam of the extremities.    MSK:  LEFT HIP  Inspection:    No obvious deformity or asymmetry, level pelvis  Palpation:    Tender about the anterior groin/joint line. Otherwise all other landmarks are nontender.  Active Range of Motion:     Flexion limited slightly by pain, IR limited slightly by pain, ER  within normal limits  Strength:    Flexion painful, grossly intact, adduction grossly intact, abduction grossly intact  Special Tests:    Positive: JF, anterior impingement (FADIR)    Negative: Logroll, resisted gluteus medius provocation, posterior impingement (EX/AB/ER)    Independent visualization of the below image:    HIP LEFT TWO TO THREE VIEWS   10/16/2020 2:17 PM      HISTORY:  Hip pain, left.     FINDINGS: Mild symphysis pubis degenerative change. There may be an  old healed fracture of the left pubic symphysis. Osteopenia suspected.                                                                      IMPRESSION: Left hip osteoarthritis including moderate narrowing  superolaterally. No fracture identified.      ALAN LAORR, MD Albert Yeo MD, CAQSM  [facility]

## 2021-02-12 NOTE — PATIENT INSTRUCTIONS
1. Primary localized osteoarthritis of left hip      -Patient is following up for left hip pain due to arthritis  -Patient reports worsening pain with therapy, Voltaren gel, oral NSAIDs  -Pain is out of proportion for x-ray findings.  I am concerned she has more extensive degenerative changes that we see on x-ray  -Patient will get an MRI for further evaluation.  Your MRI has been ordered. You may go down to the first floor, [address] to schedule the MRI in person, or call [phone] to schedule over the phone. Once you know the date of your MRI, please call my office and schedule a follow-up visit for 2 days after that.  -Patient will start Tramadol 1/2 to full tab at bedtime as needed for severe pain.  -Call direct clinic number [[phone]] at any time with questions or concerns.    Albert Yeo MD [facility]oped[facility] and Sports Medicine  [facility]

## 2021-02-12 NOTE — LETTER
2/12/2021         RE: Kandis Tse  [address]  [place] [address]        Dear Colleague,    Thank you for referring your patient, Kandis Tse, to the [facility] [place]. Please see a copy of my visit note below.    ASSESSMENT & PLAN  Patient Instructions     1. Primary localized osteoarthritis of left hip      -Patient is following up for left hip pain due to arthritis  -Patient reports worsening pain with therapy, Voltaren gel, oral NSAIDs  -Pain is out of proportion for x-ray findings.  I am concerned she has more extensive degenerative changes that we see on x-ray  -Patient will get an MRI for further evaluation.  Your MRI has been ordered. You may go down to the first floor, [address] to schedule the MRI in person, or call [phone] to schedule over the phone. Once you know the date of your MRI, please call my office and schedule a follow-up visit for 2 days after that.  -Patient will start Tramadol 1/2 to full tab at bedtime as needed for severe pain.  -Call direct clinic number [[phone]] at any time with questions or concerns.    Albert Yeo MD [facility] Medicine  [facility] [place]          -----    SUBJECTIVE:  Kandis Tse is a 75 year old female who is seen in follow-up for left hip pain due to arthritis.They were last seen 10/21/2020.     Since their last visit reports worsening pain. States pain is now constant, and everything seems to cause pain now.  They indicate that their current pain level is 8/10. They have tried Aleve, other medications: voltaren gel, home exercises, physical therapy (5 visits).      The patient is seen by themselves.    Patient's past medical, surgical, social, and family histories were reviewed today and no changes are noted.    REVIEW OF SYSTEMS:  Constitutional: NEGATIVE for fever, chills, change in weight  Skin: NEGATIVE for worrisome rashes, moles or  "lesions  GI/: NEGATIVE for bowel or bladder changes  Neuro: NEGATIVE for weakness, dizziness or paresthesias    OBJECTIVE:  /78   Ht 1.715 m (5' 7.5\")   Wt 71.7 kg (158 lb)   BMI 24.38 kg/m     General: healthy, alert and in no distress  HEENT: no scleral icterus or conjunctival erythema  Skin: no suspicious lesions or rash. No jaundice.  CV: regular rhythm by palpation, no pedal edema  Resp: normal respiratory effort without conversational dyspnea   Psych: normal mood and affect  Gait: normal steady gait with appropriate coordination and balance  Neuro: normal light touch sensory exam of the extremities.    MSK:  LEFT HIP  Inspection:    No obvious deformity or asymmetry, level pelvis  Palpation:    Tender about the anterior groin/joint line. Otherwise all other landmarks are nontender.  Active Range of Motion:     Flexion limited slightly by pain, IR limited slightly by pain, ER  within normal limits  Strength:    Flexion painful, grossly intact, adduction grossly intact, abduction grossly intact  Special Tests:    Positive: JF, anterior impingement (FADIR)    Negative: Logroll, resisted gluteus medius provocation, posterior impingement (EX/AB/ER)    Independent visualization of the below image:    HIP LEFT TWO TO THREE VIEWS   10/16/2020 2:17 PM      HISTORY:  Hip pain, left.     FINDINGS: Mild symphysis pubis degenerative change. There may be an  old healed fracture of the left pubic symphysis. Osteopenia suspected.                                                                      IMPRESSION: Left hip osteoarthritis including moderate narrowing  superolaterally. No fracture identified.      ALAN LAORR, MD Albert Yeo MD, [facility]            Again, thank you for allowing me to participate in the care of your patient.        Sincerely,        Albert Yeo, MD    "

## 2021-02-20 ENCOUNTER — HOSPITAL ENCOUNTER (OUTPATIENT)
Dept: MRI IMAGING | Facility: CLINIC | Age: 76
Discharge: HOME | End: 2021-02-20
Attending: FAMILY MEDICINE | Admitting: FAMILY MEDICINE
Payer: COMMERCIAL

## 2021-02-20 DIAGNOSIS — M16.12 PRIMARY LOCALIZED OSTEOARTHRITIS OF LEFT HIP: ICD-10-CM

## 2021-02-20 PROCEDURE — 73721 MRI JNT OF LWR EXTRE W/O DYE: CPT | Mod: 26 | Performed by: RADIOLOGY

## 2021-02-20 PROCEDURE — 73721 MRI JNT OF LWR EXTRE W/O DYE: CPT | Mod: LT

## 2021-02-21 LAB — RADIOLOGIST FLAGS: NORMAL

## 2021-02-22 ENCOUNTER — OFFICE VISIT (OUTPATIENT)
Dept: PEDIATRICS | Facility: CLINIC | Age: 76
End: 2021-02-22
Payer: COMMERCIAL

## 2021-02-22 ENCOUNTER — VIRTUAL VISIT (OUTPATIENT)
Dept: ORTHOPEDICS | Facility: CLINIC | Age: 76
End: 2021-02-22
Payer: COMMERCIAL

## 2021-02-22 VITALS
HEART RATE: 102 BPM | DIASTOLIC BLOOD PRESSURE: 76 MMHG | WEIGHT: 158 LBS | SYSTOLIC BLOOD PRESSURE: 124 MMHG | OXYGEN SATURATION: 98 % | RESPIRATION RATE: 18 BRPM | TEMPERATURE: 98.1 F | BODY MASS INDEX: 24.38 KG/M2

## 2021-02-22 DIAGNOSIS — Z85.3 PERSONAL HISTORY OF MALIGNANT NEOPLASM OF BREAST: ICD-10-CM

## 2021-02-22 DIAGNOSIS — D48.62 NEOPLASM OF UNCERTAIN BEHAVIOR OF LEFT BREAST: Primary | ICD-10-CM

## 2021-02-22 PROCEDURE — 99214 OFFICE O/P EST MOD 30 MIN: CPT | Performed by: PHYSICIAN ASSISTANT

## 2021-02-22 NOTE — PROGRESS NOTES
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  - MA Diagnostic Digital Left; Future    Personal history of malignant neoplasm of breast    BARRON Perez [facility] JOSE Marquis is a 75 year old who presents for the following health issues:    HPI   Breast Concern  Onset/Duration: 2 weeks  Description:   Location: in front of nipple on L breast  Pain or tenderness: no  Redness: no  Intensity: mild  Progression of Symptoms: same  Accompanying Signs & Symptoms:  Any lumps in axillary region: no  Movable: no  Nipple discharge: none  Changes in the skin or nipple: none  On Hormone therapy:  no   Does it change with menstrual cycle: not applicable  No changes in weight, fatigue  Previous history of similar problem: yes 1991  First degree relative with breast cancer: a positive family history for breast cancer in her mother.  Precipitating factors:           Worsened by: none  Alleviating factors:            Improved by: none  Therapies tried and outcome: None  No LMP recorded. Patient has had a hysterectomy.     Lumpectomy on left, LN resection. Radiation only. No chemo. 1991.     Review of Systems   Constitutional, HEENT, cardiovascular, pulmonary, gi and gu systems are negative, except as otherwise noted.      Objective    /76   Pulse 102   Temp 98.1  F (36.7  C) (Tympanic)   Resp 18   Wt 71.7 kg (158 lb)   SpO2 98%   BMI 24.38 kg/m    Body mass index is 24.38 kg/m .  Physical Exam   GENERAL: alert and no distress  EYES: Eyes grossly normal to inspection, PERRL and conjunctivae and sclerae normal  Breast:  Breast implants bilaterally; small, firm, mass of the left breast--medial to nipple. Nontender. No axillary LN    No results found for any visits on 02/22/21.

## 2021-02-22 NOTE — PROGRESS NOTES
"Kandis is a 75 year old who is being evaluated via a billable telephone visit.      What phone number would you like to be contacted at? ***  How would you like to obtain your AVS? {AVS Preference:481508}  Phone call duration: *** minutes    ASSESSMENT & PLAN  There are no Patient Instructions on file for this visit.  -----    SUBJECTIVE:  Kandis Tse is a 75 year old female who is seen in follow-up for left hip pain due to arthritis.They were last seen 2/12/2021     Since their last visit reports {sjapercentimprovement:783529::\"0% - (About the same as last time)\"}. They indicate that their current pain level is {PAIN SCALE:238689}. They have tried {sjatreatmentoptions:035964}.      The patient is seen {sjaparent:547747}.    Patient's past medical, surgical, social, and family histories were reviewed today {SBDROSEXCEPTIONS:192215}    REVIEW OF SYSTEMS:  Constitutional: NEGATIVE for fever, chills, change in weight  Skin: NEGATIVE for worrisome rashes, moles or lesions  GI/: NEGATIVE for bowel or bladder changes  Neuro: NEGATIVE for weakness, dizziness or paresthesias    OBJECTIVE:  There were no vitals taken for this visit.   General: healthy, alert and in no distress  HEENT: no scleral icterus or conjunctival erythema  Skin: no suspicious lesions or rash. No jaundice.  CV: regular rhythm by palpation, no pedal edema  Resp: normal respiratory effort without conversational dyspnea   Psych: normal mood and affect  Gait: normal steady gait with appropriate coordination and balance  Neuro: normal light touch sensory exam of the extremities.    MSK:    Independent visualization of the below image:  MR left hip without contrast 2/21/2021 8:56 AM     Techniques: Multiplanar multisequence imaging of the left hip was  obtained without  administration of intra-articular or intravenous  contrast using routine protocol.     History: Hip pain, chronic, osteoarthritis suspected; Primary  localized osteoarthritis of " left hip      Comparison: 10/16/2020     Findings:     Study prematurely terminated due to pain and acquired sequences are  limited by motion.     Osseous structures  Osseous structures: Extensive opposing edema-like marrow signal  intensity at the left hip including femoral head and acetabulum,  consistent with severe osteoarthritis with presumed underlying  full-thickness chondral loss.     Focal marrow signal alteration with mild T1 hypointensity and T2  hyperintensity at the left proximal femur subtrochanteric region, may  be due to focal red marrow but alternatively stress change may have  similar appearance though motion limits full evaluation.     Articular cartilage and labrum     Articular cartilage: Not well assessed.     Labrum: Not well assessed.     Ligament teres and transverse ligament of acetabulum: Not well  assessed.     Joint or bursal effusion     Joint effusion: Small joint effusion with mild internal debris.     Bursal effusion: No trochanteric bursal effusion.     Muscles and tendons  Muscles and tendons: Not well assessed.     Nerves:  Not well assessed.     Other Findings:  None.                                                                      Impression:  Essentially nondiagnostic due to motion and premature termination of  the study.  1. Severe left hip osteoarthritis.  2. Focal marrow signal alteration at the left proximal femur  subtrochanteric region, may be due to focal red marrow but  alternatively stress change may have similar appearance though motion  limits full evaluation. Please correlate clinically and if indicated,  repeat radiographs or CT may be helpful.     [Consider Follow Up: Question stress reaction.]     This report will be copied to the [facility] to ensure a  provider acknowledges the finding.      JANET GUTIERREZASHI  Patient's conditions were thoroughly discussed during today's visit with greater than 50% of the visit spent counseling the patient with  total time spent face-to-face with the patient being *** minutes.    Albert Yeo MD, CAM  [facility]

## 2021-02-22 NOTE — Clinical Note
"    2/22/2021         RE: Kandis Tse  [address] [place] [address]        Dear Colleague,    Thank you for referring your patient, Kandis Tse, to the [facility] [place]. Please see a copy of my visit note below.    Kanids is a 75 year old who is being evaluated via a billable telephone visit.      What phone number would you like to be contacted at? ***  How would you like to obtain your AVS? {AVS Preference:220613}  Phone call duration: *** minutes    ASSESSMENT & PLAN  There are no Patient Instructions on file for this visit.  -----    SUBJECTIVE:  Kandis Tse is a 75 year old female who is seen in follow-up for left hip pain due to arthritis.They were last seen 2/12/2021     Since their last visit reports {sjapercentimprovement:973145::\"0% - (About the same as last time)\"}. They indicate that their current pain level is {PAIN SCALE:819894}. They have tried {sjatreatmentoptions:134540}.      The patient is seen {sjaparent:534664}.    Patient's past medical, surgical, social, and family histories were reviewed today {SBDROSEXCEPTIONS:661801}    REVIEW OF SYSTEMS:  Constitutional: NEGATIVE for fever, chills, change in weight  Skin: NEGATIVE for worrisome rashes, moles or lesions  GI/: NEGATIVE for bowel or bladder changes  Neuro: NEGATIVE for weakness, dizziness or paresthesias    OBJECTIVE:  There were no vitals taken for this visit.   General: healthy, alert and in no distress  HEENT: no scleral icterus or conjunctival erythema  Skin: no suspicious lesions or rash. No jaundice.  CV: regular rhythm by palpation, no pedal edema  Resp: normal respiratory effort without conversational dyspnea   Psych: normal mood and affect  Gait: normal steady gait with appropriate coordination and balance  Neuro: normal light touch sensory exam of the extremities.    MSK:    Independent visualization of the below image:  MR left hip without contrast 2/21/2021 " 8:56 AM     Techniques: Multiplanar multisequence imaging of the left hip was  obtained without  administration of intra-articular or intravenous  contrast using routine protocol.     History: Hip pain, chronic, osteoarthritis suspected; Primary  localized osteoarthritis of left hip      Comparison: 10/16/2020     Findings:     Study prematurely terminated due to pain and acquired sequences are  limited by motion.     Osseous structures  Osseous structures: Extensive opposing edema-like marrow signal  intensity at the left hip including femoral head and acetabulum,  consistent with severe osteoarthritis with presumed underlying  full-thickness chondral loss.     Focal marrow signal alteration with mild T1 hypointensity and T2  hyperintensity at the left proximal femur subtrochanteric region, may  be due to focal red marrow but alternatively stress change may have  similar appearance though motion limits full evaluation.     Articular cartilage and labrum     Articular cartilage: Not well assessed.     Labrum: Not well assessed.     Ligament teres and transverse ligament of acetabulum: Not well  assessed.     Joint or bursal effusion     Joint effusion: Small joint effusion with mild internal debris.     Bursal effusion: No trochanteric bursal effusion.     Muscles and tendons  Muscles and tendons: Not well assessed.     Nerves:  Not well assessed.     Other Findings:  None.                                                                      Impression:  Essentially nondiagnostic due to motion and premature termination of  the study.  1. Severe left hip osteoarthritis.  2. Focal marrow signal alteration at the left proximal femur  subtrochanteric region, may be due to focal red marrow but  alternatively stress change may have similar appearance though motion  limits full evaluation. Please correlate clinically and if indicated,  repeat radiographs or CT may be helpful.     [Consider Follow Up: Question stress  reaction.]     This report will be copied to the [facility] to ensure a  provider acknowledges the finding.      JANET GALDAMEZ  Patient's conditions were thoroughly discussed during today's visit with greater than 50% of the visit spent counseling the patient with total time spent face-to-face with the patient being *** minutes.    Albert Yeo MD, [facility]            Again, thank you for allowing me to participate in the care of your patient.        Sincerely,        Albert Yeo, MD

## 2021-02-24 ENCOUNTER — TELEPHONE (OUTPATIENT)
Dept: ORTHOPEDICS | Facility: CLINIC | Age: 76
End: 2021-02-24

## 2021-02-24 DIAGNOSIS — M16.12 PRIMARY LOCALIZED OSTEOARTHRITIS OF LEFT HIP: Primary | ICD-10-CM

## 2021-02-24 NOTE — TELEPHONE ENCOUNTER
Called patient. States her pain has gotten a lot worse, and would just like to see a hip surgeon to discuss next step options.     Patient was very appreciative of care and call back.     Please refer patient to hip surgeon.     Soumya Rider MS, ATC

## 2021-02-25 ENCOUNTER — HOSPITAL ENCOUNTER (OUTPATIENT)
Dept: MAMMOGRAPHY | Facility: CLINIC | Age: 76
End: 2021-02-25
Attending: PHYSICIAN ASSISTANT
Payer: COMMERCIAL

## 2021-02-25 ENCOUNTER — HOSPITAL ENCOUNTER (OUTPATIENT)
Dept: ULTRASOUND IMAGING | Facility: CLINIC | Age: 76
End: 2021-02-25
Attending: PHYSICIAN ASSISTANT
Payer: COMMERCIAL

## 2021-02-25 DIAGNOSIS — D48.62 NEOPLASM OF UNCERTAIN BEHAVIOR OF LEFT BREAST: ICD-10-CM

## 2021-02-25 DIAGNOSIS — N63.20 LEFT BREAST MASS: ICD-10-CM

## 2021-02-25 DIAGNOSIS — N63.20 MASS OF LEFT BREAST: ICD-10-CM

## 2021-02-25 PROCEDURE — 76642 ULTRASOUND BREAST LIMITED: CPT | Mod: LT

## 2021-02-25 PROCEDURE — G0279 TOMOSYNTHESIS, MAMMO: HCPCS

## 2021-02-25 NOTE — TELEPHONE ENCOUNTER
Patient made an appointment to see Dr. Becker on 2/26/21. Closing encounter.     STUART Urrutia RN

## 2021-02-26 ENCOUNTER — ANCILLARY PROCEDURE (OUTPATIENT)
Dept: GENERAL RADIOLOGY | Facility: CLINIC | Age: 76
End: 2021-02-26
Attending: ORTHOPAEDIC SURGERY
Payer: COMMERCIAL

## 2021-02-26 ENCOUNTER — OFFICE VISIT (OUTPATIENT)
Dept: ORTHOPEDICS | Facility: CLINIC | Age: 76
End: 2021-02-26
Attending: FAMILY MEDICINE
Payer: COMMERCIAL

## 2021-02-26 VITALS
SYSTOLIC BLOOD PRESSURE: 138 MMHG | BODY MASS INDEX: 23.4 KG/M2 | HEIGHT: 68 IN | WEIGHT: 154.4 LBS | DIASTOLIC BLOOD PRESSURE: 72 MMHG

## 2021-02-26 DIAGNOSIS — M16.12 PRIMARY LOCALIZED OSTEOARTHRITIS OF LEFT HIP: ICD-10-CM

## 2021-02-26 DIAGNOSIS — F39 MOOD DISORDER (H): ICD-10-CM

## 2021-02-26 DIAGNOSIS — M16.12 PRIMARY OSTEOARTHRITIS OF LEFT HIP: ICD-10-CM

## 2021-02-26 PROCEDURE — 99204 OFFICE O/P NEW MOD 45 MIN: CPT | Performed by: ORTHOPAEDIC SURGERY

## 2021-02-26 PROCEDURE — 72170 X-RAY EXAM OF PELVIS: CPT | Performed by: ORTHOPAEDIC SURGERY

## 2021-02-26 ASSESSMENT — MIFFLIN-ST. JEOR: SCORE: 1235.91

## 2021-02-26 NOTE — PROGRESS NOTES
HISTORY OF PRESENT ILLNESS:    Kandis Tse is a 75 year old female who is seen in consultation at the request of Dr. Yeo for left hip pain. Patient reports pain in left hip began about 1 year ago and has progressively gotten worse. She denies acute injury or trauma. She notes pain is located in in left anterior hip and left groin. She reports occasional sharp shooting pains that radiate down left lateral thigh to knee. Pain increases with sitting, standing, walking, chores (vacuuming, cooking, etc.). Patient reports pain is constant. She notes no treatments have helped with pain. Patient notes current pain is 9/10 and at worst is 9/10.    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    Past Medical History:   Diagnosis Date     Anxiety      Breast cancer (H)      Hx of radiation therapy      MI, old      Osteopenia      Peripheral vertigo      PONV (postoperative nausea and vomiting)      Stress-induced cardiomyopathy      T12 compression fracture (H)        Past Surgical History:   Procedure Laterality Date     BREAST SURGERY      breast biopsy, breast lumpectomy     COSMETIC SURGERY      augmentation mammaplasty     GYN SURGERY      hysterectomy     ORTHOPEDIC SURGERY Bilateral     bunionectomy/10 toes repaired     REPAIR HAMMER TOE Bilateral 4/18/2018    Procedure: REPAIR HAMMER TOE;  RIGHT SECOND, FOURTH, AND FIFTH  MALLET TOE RECONSTRUCTION WITH LEFT THIRD CLAWTOE RECONSTRUCTION (CHOICE)  ;  Surgeon: Edgar Carlisle MD;  Location: [facility] OR     WRIST SURGERY Left        Family History   Problem Relation Age of Onset     Breast Cancer Mother      Dementia Father        Social History     Socioeconomic History     Marital  status:      Spouse name: Not on file     Number of children: Not on file     Years of education: Not on file     Highest education level: Not on file   Occupational History     Not on file   Social Needs     Financial resource strain: Not on file     Food insecurity     Worry: Not on file     Inability: Not on file     Transportation needs     Medical: Not on file     Non-medical: Not on file   Tobacco Use     Smoking status: Former Smoker     Smokeless tobacco: Never Used   Substance and Sexual Activity     Alcohol use: No     Drug use: No     Sexual activity: Yes     Partners: Male   Lifestyle     Physical activity     Days per week: Not on file     Minutes per session: Not on file     Stress: Not on file   Relationships     Social connections     Talks on phone: Not on file     Gets together: Not on file     Attends Religion service: Not on file     Active member of club or organization: Not on file     Attends meetings of clubs or organizations: Not on file     Relationship status: Not on file     Intimate partner violence     Fear of current or ex partner: Not on file     Emotionally abused: Not on file     Physically abused: Not on file     Forced sexual activity: Not on file   Other Topics Concern     Parent/sibling w/ CABG, MI or angioplasty before 65F 55M? Not Asked   Social History Narrative     Not on file       Current Outpatient Medications   Medication Sig Dispense Refill     ASPIRIN PO Take 81 mg by mouth daily       carvedilol (COREG) 6.25 MG tablet Take 1 tablet (6.25 mg) by mouth 2 times daily (with meals) 180 tablet 3     citalopram (CELEXA) 20 MG tablet Take 1 tablet (20 mg) by mouth daily 90 tablet 3     diclofenac (VOLTAREN) 1 % topical gel Place 4 g onto the skin 3 times daily as needed for moderate pain 1 Tube 3     naproxen (NAPROSYN) 250 MG tablet Take 1 tablet (250 mg) by mouth 2 times daily as needed for moderate pain 180 tablet 1     traMADol (ULTRAM) 50 MG tablet Take 1 tablet  "(50 mg) by mouth 2 times daily as needed for moderate to severe pain 28 tablet 0       No Known Allergies    REVIEW OF SYSTEMS:  CONSTITUTIONAL:  NEGATIVE for fever, chills, change in weight  INTEGUMENTARY/SKIN:  NEGATIVE for worrisome rashes, moles or lesions  EYES:  NEGATIVE for vision changes or irritation  ENT/MOUTH:  NEGATIVE for ear, mouth and throat problems  RESP:  NEGATIVE for significant cough or SOB  BREAST:  NEGATIVE for masses, tenderness or discharge  CV:  NEGATIVE for chest pain, palpitations or peripheral edema  GI:  NEGATIVE for nausea, abdominal pain, heartburn, or change in bowel habits  :  Negative   MUSCULOSKELETAL:  See HPI above  NEURO:  NEGATIVE for weakness, dizziness or paresthesias  ENDOCRINE:  NEGATIVE for temperature intolerance, skin/hair changes  HEME/ALLERGY/IMMUNE:  NEGATIVE for bleeding problems  PSYCHIATRIC:  NEGATIVE for changes in mood or affect      PHYSICAL EXAM:  /72   Ht 1.715 m (5' 7.5\")   Wt 70 kg (154 lb 6.4 oz)   BMI 23.83 kg/m    Body mass index is 23.83 kg/m .   GENERAL APPEARANCE: healthy, alert and no distress   HEENT: No apparent thyroid megaly. Clear sclera with normal ocular movement  RESPIRATORY: No labored breathing  SKIN: no suspicious lesions or rashes  NEURO: Normal strength and tone, mentation intact and speech normal  VASCULAR: Good pulses, and capillary refill   LYMPH: no lymphadenopathy   PSYCH:  mentation appears normal and affect normal/bright    MUSCULOSKELETAL:  Not in acute distress  Moderate difficulty getting up from sitting  Obvious limp with limited range of motion of the left hip  She avoids full extension of the hip when she walks  On supine position, she lacks 4 to 5 degrees of terminal extension of the hip  She has no internal rotation beyond neutral, left hip  External rotation of the left hip is limited to 15 degrees; abduction is limited to 15 degrees as well.  Left hip with a pain related to internal rotation      Right hip and " bilateral knee range of motion is full  Motor function is full  Sensation is intact  Circulation is intact     ASSESSMENT:  Advanced left hip DJD with subchondral edema on both sides of the joint  Takotsubo cardiomyopathy  Anxiety  Mood disorder    PLAN:  We visualized the x-ray images of left hip from October 16, 2020 as well as MRI scan images of February 20, 2021.  Findings were thoroughly explained.  She does have reactive bone changes/edema and the femoral head as well as acetabulum.  Nature of osteoarthritis as well as reactive bone edema were explained.  It is not surprising that she has done much pain because of the bony reaction from the DJD.  Nonweightbearing AP x-ray of the hip already showed bone-on-bone situation in the far lateral superior aspect of the joint in October 2020.    Because of severe pain at the level of 9 out of 10 as well as constant pain affecting her Resting As Well As Sleep at this point, total hip replacement was felt to be the best option.  She already went through physical therapy with very little impact.    The rest of the time was spent going over the details of the surgery including the technical aspect, hospitalization course, overall healing time.  Potential risks of infection, dislocation, leg length inequality, periprosthetic fracture, DVT, pulmonary embolism, neurovascular compromise and bleeding.     We will obtain AP pelvis to have a better assessment of the leg length since the previous x-rays include only the left side.  All the questions were answered.  Information and a printed form was provided.  Preoperative soap was provided.    Left total of arthroplasty will be scheduled according to her convenience.  .    Imaging Interpretation:   Normal right hip.      Dante Becker MD  Department of Orthopedic Surgery        Disclaimer: This note consists of symbols derived from keyboarding, dictation and/or voice recognition software. As a result, there may be errors in the script  that have gone undetected. Please consider this when interpreting information found in this chart.

## 2021-02-26 NOTE — LETTER
2/26/2021         RE: Kandis Tse  [address]  [place] [address]        Dear Colleague,    Thank you for referring your patient, Kandis Tse, to the [facility] [place]. Please see a copy of my visit note below.    HISTORY OF PRESENT ILLNESS:    Kandis Tse is a 75 year old female who is seen in consultation at the request of Dr. Yeo for left hip pain. Patient reports pain in left hip began about 1 year ago and has progressively gotten worse. She denies acute injury or trauma. She notes pain is located in in left anterior hip and left groin. She reports occasional sharp shooting pains that radiate down left lateral thigh to knee. Pain increases with sitting, standing, walking, chores (vacuuming, cooking, etc.). Patient reports pain is constant. She notes no treatments have helped with pain. Patient notes current pain is 9/10 and at worst is 9/10.    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    Past Medical History:   Diagnosis Date     Anxiety      Breast cancer (H)      Hx of radiation therapy      MI, old      Osteopenia      Peripheral vertigo      PONV (postoperative nausea and vomiting)      Stress-induced cardiomyopathy      T12 compression fracture (H)        Past Surgical History:   Procedure Laterality Date     BREAST SURGERY      breast biopsy, breast lumpectomy     COSMETIC SURGERY      augmentation mammaplasty     GYN SURGERY      hysterectomy     ORTHOPEDIC SURGERY Bilateral     bunionectomy/10 toes repaired     REPAIR HAMMER TOE Bilateral 4/18/2018    Procedure: REPAIR HAMMER TOE;  RIGHT SECOND, FOURTH, AND FIFTH  MALLET TOE RECONSTRUCTION WITH LEFT THIRD  CLAWTOE RECONSTRUCTION (CHOICE)  ;  Surgeon: Edgar Carlisle MD;  Location: [facility] OR     WRIST SURGERY Left        Family History   Problem Relation Age of Onset     Breast Cancer Mother      Dementia Father        Social History     Socioeconomic History     Marital status:      Spouse name: Not on file     Number of children: Not on file     Years of education: Not on file     Highest education level: Not on file   Occupational History     Not on file   Social Needs     Financial resource strain: Not on file     Food insecurity     Worry: Not on file     Inability: Not on file     Transportation needs     Medical: Not on file     Non-medical: Not on file   Tobacco Use     Smoking status: Former Smoker     Smokeless tobacco: Never Used   Substance and Sexual Activity     Alcohol use: No     Drug use: No     Sexual activity: Yes     Partners: Male   Lifestyle     Physical activity     Days per week: Not on file     Minutes per session: Not on file     Stress: Not on file   Relationships     Social connections     Talks on phone: Not on file     Gets together: Not on file     Attends Jehovah's witness service: Not on file     Active member of club or organization: Not on file     Attends meetings of clubs or organizations: Not on file     Relationship status: Not on file     Intimate partner violence     Fear of current or ex partner: Not on file     Emotionally abused: Not on file     Physically abused: Not on file     Forced sexual activity: Not on file   Other Topics Concern     Parent/sibling w/ CABG, MI or angioplasty before 65F 55M? Not Asked   Social History Narrative     Not on file       Current Outpatient Medications   Medication Sig Dispense Refill     ASPIRIN PO Take 81 mg by mouth daily       carvedilol (COREG) 6.25 MG tablet Take 1 tablet (6.25 mg) by mouth 2 times daily (with meals) 180 tablet 3     citalopram (CELEXA) 20 MG tablet Take 1 tablet (20 mg) by mouth daily 90 tablet 3     diclofenac  "(VOLTAREN) 1 % topical gel Place 4 g onto the skin 3 times daily as needed for moderate pain 1 Tube 3     naproxen (NAPROSYN) 250 MG tablet Take 1 tablet (250 mg) by mouth 2 times daily as needed for moderate pain 180 tablet 1     traMADol (ULTRAM) 50 MG tablet Take 1 tablet (50 mg) by mouth 2 times daily as needed for moderate to severe pain 28 tablet 0       No Known Allergies    REVIEW OF SYSTEMS:  CONSTITUTIONAL:  NEGATIVE for fever, chills, change in weight  INTEGUMENTARY/SKIN:  NEGATIVE for worrisome rashes, moles or lesions  EYES:  NEGATIVE for vision changes or irritation  ENT/MOUTH:  NEGATIVE for ear, mouth and throat problems  RESP:  NEGATIVE for significant cough or SOB  BREAST:  NEGATIVE for masses, tenderness or discharge  CV:  NEGATIVE for chest pain, palpitations or peripheral edema  GI:  NEGATIVE for nausea, abdominal pain, heartburn, or change in bowel habits  :  Negative   MUSCULOSKELETAL:  See HPI above  NEURO:  NEGATIVE for weakness, dizziness or paresthesias  ENDOCRINE:  NEGATIVE for temperature intolerance, skin/hair changes  HEME/ALLERGY/IMMUNE:  NEGATIVE for bleeding problems  PSYCHIATRIC:  NEGATIVE for changes in mood or affect      PHYSICAL EXAM:  /72   Ht 1.715 m (5' 7.5\")   Wt 70 kg (154 lb 6.4 oz)   BMI 23.83 kg/m    Body mass index is 23.83 kg/m .   GENERAL APPEARANCE: healthy, alert and no distress   HEENT: No apparent thyroid megaly. Clear sclera with normal ocular movement  RESPIRATORY: No labored breathing  SKIN: no suspicious lesions or rashes  NEURO: Normal strength and tone, mentation intact and speech normal  VASCULAR: Good pulses, and capillary refill   LYMPH: no lymphadenopathy   PSYCH:  mentation appears normal and affect normal/bright    MUSCULOSKELETAL:  Not in acute distress  Moderate difficulty getting up from sitting  Obvious limp with limited range of motion of the left hip  She avoids full extension of the hip when she walks  On supine position, she lacks 4 " to 5 degrees of terminal extension of the hip  She has no internal rotation beyond neutral, left hip  External rotation of the left hip is limited to 15 degrees; abduction is limited to 15 degrees as well.  Left hip with a pain related to internal rotation      Right hip and bilateral knee range of motion is full  Motor function is full  Sensation is intact  Circulation is intact     ASSESSMENT:  Advanced left hip DJD with subchondral edema on both sides of the joint  Takotsubo cardiomyopathy  Anxiety  Mood disorder    PLAN:  We visualized the x-ray images of left hip from October 16, 2020 as well as MRI scan images of February 20, 2021.  Findings were thoroughly explained.  She does have reactive bone changes/edema and the femoral head as well as acetabulum.  Nature of osteoarthritis as well as reactive bone edema were explained.  It is not surprising that she has done much pain because of the bony reaction from the DJD.  Nonweightbearing AP x-ray of the hip already showed bone-on-bone situation in the far lateral superior aspect of the joint in October 2020.    Because of severe pain at the level of 9 out of 10 as well as constant pain affecting her Resting As Well As Sleep at this point, total hip replacement was felt to be the best option.  She already went through physical therapy with very little impact.    The rest of the time was spent going over the details of the surgery including the technical aspect, hospitalization course, overall healing time.  Potential risks of infection, dislocation, leg length inequality, periprosthetic fracture, DVT, pulmonary embolism, neurovascular compromise and bleeding.     We will obtain AP pelvis to have a better assessment of the leg length since the previous x-rays include only the left side.  All the questions were answered.  Information and a printed form was provided.  Preoperative soap was provided.    Left total of arthroplasty will be scheduled according to her  convenience.  .    Imaging Interpretation:   Normal right hip.      Dante Becker MD  Department of Orthopedic Surgery        Disclaimer: This note consists of symbols derived from keyboarding, dictation and/or voice recognition software. As a result, there may be errors in the script that have gone undetected. Please consider this when interpreting information found in this chart.        Again, thank you for allowing me to participate in the care of your patient.        Sincerely,        Dante Becker MD

## 2021-03-01 ENCOUNTER — TELEPHONE (OUTPATIENT)
Dept: ORTHOPEDICS | Facility: CLINIC | Age: 76
End: 2021-03-01

## 2021-03-01 ENCOUNTER — PREP FOR PROCEDURE (OUTPATIENT)
Dept: ORTHOPEDICS | Facility: CLINIC | Age: 76
End: 2021-03-01

## 2021-03-01 DIAGNOSIS — Z11.59 ENCOUNTER FOR SCREENING FOR OTHER VIRAL DISEASES: Primary | ICD-10-CM

## 2021-03-01 DIAGNOSIS — M16.12 PRIMARY LOCALIZED OSTEOARTHRITIS OF LEFT HIP: ICD-10-CM

## 2021-03-01 DIAGNOSIS — M16.12 PRIMARY LOCALIZED OSTEOARTHRITIS OF LEFT HIP: Primary | ICD-10-CM

## 2021-03-01 NOTE — TELEPHONE ENCOUNTER
Surgery scheduled, Covid and Physical therapy orders placed.    Type of surgery: Left total hip arthroplasty / replacement  Location of surgery: [facility]  Date and time of surgery: 3/16/2021 at 1:50pm  Surgeon: Dr. Becker   Pre-Op Appt Date: Patient will call PCP  Post-Op Appt Date: 3/29/21   Packet sent out: Yes  Pre-cert/Authorization completed:  No  Date: 3/1/21    Chelsea Boggs ATC    3/1/2021 at 1:54 PM

## 2021-03-03 ENCOUNTER — IMMUNIZATION (OUTPATIENT)
Dept: NURSING | Facility: CLINIC | Age: 76
End: 2021-03-03
Attending: FAMILY MEDICINE
Payer: COMMERCIAL

## 2021-03-03 PROCEDURE — 91300 PR COVID VAC PFIZER DIL RECON 30 MCG/0.3 ML IM: CPT

## 2021-03-03 PROCEDURE — 0002A PR COVID VAC PFIZER DIL RECON 30 MCG/0.3 ML IM: CPT

## 2021-03-05 ENCOUNTER — OFFICE VISIT (OUTPATIENT)
Dept: PEDIATRICS | Facility: CLINIC | Age: 76
End: 2021-03-05
Payer: COMMERCIAL

## 2021-03-05 VITALS
HEIGHT: 68 IN | HEART RATE: 76 BPM | DIASTOLIC BLOOD PRESSURE: 72 MMHG | TEMPERATURE: 96.9 F | OXYGEN SATURATION: 98 % | RESPIRATION RATE: 16 BRPM | BODY MASS INDEX: 23.46 KG/M2 | WEIGHT: 154.8 LBS | SYSTOLIC BLOOD PRESSURE: 122 MMHG

## 2021-03-05 DIAGNOSIS — M16.12 PRIMARY LOCALIZED OSTEOARTHRITIS OF LEFT HIP: ICD-10-CM

## 2021-03-05 DIAGNOSIS — I51.81 TAKOTSUBO CARDIOMYOPATHY: ICD-10-CM

## 2021-03-05 DIAGNOSIS — F39 MOOD DISORDER (H): ICD-10-CM

## 2021-03-05 DIAGNOSIS — Z01.818 PREOP GENERAL PHYSICAL EXAM: Primary | ICD-10-CM

## 2021-03-05 PROCEDURE — 93000 ELECTROCARDIOGRAM COMPLETE: CPT | Performed by: INTERNAL MEDICINE

## 2021-03-05 PROCEDURE — 99214 OFFICE O/P EST MOD 30 MIN: CPT | Performed by: INTERNAL MEDICINE

## 2021-03-05 PROCEDURE — 96127 BRIEF EMOTIONAL/BEHAV ASSMT: CPT | Performed by: INTERNAL MEDICINE

## 2021-03-05 RX ORDER — TRAMADOL HYDROCHLORIDE 50 MG/1
50 TABLET ORAL 2 TIMES DAILY PRN
Qty: 28 TABLET | Refills: 0 | Status: SHIPPED | OUTPATIENT
Start: 2021-03-05 | End: 2022-01-03

## 2021-03-05 ASSESSMENT — ANXIETY QUESTIONNAIRES
GAD7 TOTAL SCORE: 3
1. FEELING NERVOUS, ANXIOUS, OR ON EDGE: NOT AT ALL
4. TROUBLE RELAXING: SEVERAL DAYS
7. FEELING AFRAID AS IF SOMETHING AWFUL MIGHT HAPPEN: NOT AT ALL
6. BECOMING EASILY ANNOYED OR IRRITABLE: SEVERAL DAYS
3. WORRYING TOO MUCH ABOUT DIFFERENT THINGS: NOT AT ALL
GAD7 TOTAL SCORE: 3
7. FEELING AFRAID AS IF SOMETHING AWFUL MIGHT HAPPEN: NOT AT ALL
GAD7 TOTAL SCORE: 3
2. NOT BEING ABLE TO STOP OR CONTROL WORRYING: NOT AT ALL
5. BEING SO RESTLESS THAT IT IS HARD TO SIT STILL: SEVERAL DAYS

## 2021-03-05 ASSESSMENT — MIFFLIN-ST. JEOR: SCORE: 1241.7

## 2021-03-05 NOTE — H&P (VIEW-ONLY)
"[facility]  [address]  [address]  [place] [address]  Phone: [phone]  Fax: [phone]  Primary Provider: Yoko Conde  Pre-op Performing Provider: CARMEN BANEGAS      PREOPERATIVE EVALUATION:  Today's date: 3/5/2021    Kandis Tse is a 75 year old female who presents for a preoperative evaluation.    Surgical Information:  Surgery/Procedure: hip replacement  [facility]  Surgeon: Dr. Becker  Surgery Date: 3/16/21  Time of Surgery: 12:15  Where patient plans to recover: At home with family  Fax number for surgical facility: Note does not need to be faxed, will be available electronically in Epic.    Type of Anesthesia Anticipated: to be determined    Assessment & Plan     The proposed surgical procedure is considered INTERMEDIATE risk.    Preop general physical exam  preop instructions reviewed.  Continue with procedure as planned  - EKG 12-lead complete w/read - Clinics    Primary localized osteoarthritis of left hip  Refill pain medication   - traMADol (ULTRAM) 50 MG tablet; Take 1 tablet (50 mg) by mouth 2 times daily as needed for moderate to severe pain    Takotsubo cardiomyopathy  Resolved, normal ECHO    Mood disorder (H)  controlled       Risks and Recommendations:  The patient has the following additional risks and recommendations for perioperative complications:   - No identified additional risk factors other than previously addressed    Medication Instructions:  Patient is to take all scheduled medications on the day of surgery    RECOMMENDATION:  APPROVAL GIVEN to proceed with proposed procedure, without further diagnostic evaluation.      30 minutes spent on the date of the encounter doing chart review, history and exam, documentation and further activities as noted above        Subjective     HPI related to upcoming procedure: Pain in hip so bad that it is 24x7 and \"nothing that I can do\". Tramadol at night and takes the edge off a " little.  Is out of that and using tylenol now and would like refill.      Preop Questions 3/5/2021   1. Have you ever had a heart attack or stroke? YES - Takotsubo in 2015.  ECHO 6/10 with normal LV function.  Had normal cath in 2015   2. Have you ever had surgery on your heart or blood vessels, such as a stent placement, a coronary artery bypass, or surgery on an artery in your head, neck, heart, or legs? No   3. Do you have chest pain with activity? No   4. Do you have a history of  heart failure? No   5. Do you currently have a cold, bronchitis or symptoms of other infection? No   6. Do you have a cough, shortness of breath, or wheezing? No   7. Do you or anyone in your family have previous history of blood clots? No   8. Do you or does anyone in your family have a serious bleeding problem such as prolonged bleeding following surgeries or cuts? No   9. Have you ever had problems with anemia or been told to take iron pills? No   10. Have you had any abnormal blood loss such as black, tarry or bloody stools, or abnormal vaginal bleeding? No   11. Have you ever had a blood transfusion? No   12. Are you willing to have a blood transfusion if it is medically needed before, during, or after your surgery? Yes   13. Have you or any of your relatives ever had problems with anesthesia? No   14. Do you have sleep apnea, excessive snoring or daytime drowsiness? No   15. Do you have any artifical heart valves or other implanted medical devices like a pacemaker, defibrillator, or continuous glucose monitor? No   16. Do you have artificial joints? No   17. Are you allergic to latex? No   18. Is there any chance that you may be pregnant? -       Health Care Directive:  Patient does not have a Health Care Directive or Living Will: Discussed advance care planning with patient; information given to patient to review.    Preoperative Review of :   reviewed - controlled substances reflected in medication list.      Review of  Systems  CONSTITUTIONAL: NEGATIVE for fever, chills, change in weight  ENT/MOUTH: NEGATIVE for ear, mouth and throat problems  RESP: NEGATIVE for significant cough or SOB  CV: NEGATIVE for chest pain, palpitations or peripheral edema  GI: NEGATIVE for nausea, abdominal pain, heartburn, or change in bowel habits  MUSCULOSKELETAL: see HPI, otherwise negative   PSYCHIATRIC: NEGATIVE for changes in mood or affect    Patient Active Problem List    Diagnosis Date Noted     Primary localized osteoarthritis of left hip 03/01/2021     Priority: Medium     Added automatically from request for surgery [id number]       Mood disorder (H) 02/26/2021     Priority: Medium     Takotsubo cardiomyopathy 02/27/2020     Priority: Medium     In 2015. Admitted to [facility], underwent cath with minimal coronary artery disease.  Subsequently completely resolved.        Anxiety 02/27/2020     Priority: Medium     Osteopenia of multiple sites 02/27/2020     Priority: Medium     History of vertebral fracture, s/p vertebroplasty       Claw toe, acquired 04/18/2018     Priority: Medium      Past Medical History:   Diagnosis Date     Anxiety      Breast cancer (H)      Hx of radiation therapy      MI, old      Osteoarthritis     hips     Osteopenia      Peripheral vertigo      PONV (postoperative nausea and vomiting)      Stress-induced cardiomyopathy      T12 compression fracture (H)      Past Surgical History:   Procedure Laterality Date     BREAST SURGERY      breast biopsy, breast lumpectomy     COSMETIC SURGERY      augmentation mammaplasty     GYN SURGERY      hysterectomy     ORTHOPEDIC SURGERY Bilateral     bunionectomy/10 toes repaired     REPAIR HAMMER TOE Bilateral 4/18/2018    Procedure: REPAIR HAMMER TOE;  RIGHT SECOND, FOURTH, AND FIFTH  MALLET TOE RECONSTRUCTION WITH LEFT THIRD CLAWTOE RECONSTRUCTION (CHOICE)  ;  Surgeon: Edgar Carlisle MD;  Location: [facility] OR     WRIST SURGERY Left      Current Outpatient Medications   Medication Sig  "Dispense Refill     ASPIRIN PO Take 81 mg by mouth daily       carvedilol (COREG) 6.25 MG tablet Take 1 tablet (6.25 mg) by mouth 2 times daily (with meals) 180 tablet 3     citalopram (CELEXA) 20 MG tablet Take 1 tablet (20 mg) by mouth daily 90 tablet 3     diclofenac (VOLTAREN) 1 % topical gel Place 4 g onto the skin 3 times daily as needed for moderate pain 1 Tube 3     naproxen (NAPROSYN) 250 MG tablet Take 1 tablet (250 mg) by mouth 2 times daily as needed for moderate pain 180 tablet 1     traMADol (ULTRAM) 50 MG tablet Take 1 tablet (50 mg) by mouth 2 times daily as needed for moderate to severe pain 28 tablet 0     No Known Allergies     Social History     Tobacco Use     Smoking status: Former Smoker     Smokeless tobacco: Never Used   Substance Use Topics     Alcohol use: No       History   Drug Use No         Objective     /72 (BP Location: Right arm, Patient Position: Chair, Cuff Size: Adult Regular)   Pulse 76   Temp 96.9  F (36.1  C) (Tympanic)   Resp 16   Ht 1.721 m (5' 7.75\")   Wt 70.2 kg (154 lb 12.8 oz)   SpO2 98%   BMI 23.71 kg/m      Physical Exam  GENERAL APPEARANCE: healthy, alert and no distress  EYES: Eyes grossly normal to inspection, PERRL and conjunctivae and sclerae normal  HENT: nose and mouth without ulcers or lesions  RESP: lungs clear to auscultation - no rales, rhonchi or wheezes  CV: regular rate and rhythm, normal S1 S2, no S3 or S4 and no murmur, click or rub   ABDOMEN: soft, nontender, no HSM or masses and bowel sounds normal  SKIN: no suspicious lesions or rashes  NEURO: Normal strength and tone, sensory exam grossly normal, mentation intact and speech normal    Recent Labs   Lab Test 08/05/20  0956      POTASSIUM 4.3   CR 0.75        Diagnostics:  Hemoglobin 15.3 2/20  No labs were ordered during this visit.   EKG: appears normal, NSR, normal axis, normal intervals, no acute ST/T changes c/w ischemia, no LVH by voltage criteria, unchanged from previous " tracings    Revised Cardiac Risk Index (RCRI):  The patient has the following serious cardiovascular risks for perioperative complications:   - No serious cardiac risks = 0 points     RCRI Interpretation: 0 points: Class I (very low risk - 0.4% complication rate)             Signed Electronically by: Lili Winter MD  Copy of this evaluation report is provided to requesting physician.    [facility]    [facility] Preop Guidelines    Revised Cardiac Risk Index   Answers for HPI/ROS submitted by the patient on 3/5/2021   LASHANDA 7 TOTAL SCORE: 3

## 2021-03-05 NOTE — PROGRESS NOTES
"[facility]  [address]  [address]  [place] [address]  Phone: [phone]  Fax: [phone]  Primary Provider: Yoko Conde  Pre-op Performing Provider: CARMEN BANEGAS      PREOPERATIVE EVALUATION:  Today's date: 3/5/2021    Kandis Tse is a 75 year old female who presents for a preoperative evaluation.    Surgical Information:  Surgery/Procedure: hip replacement  Surgery Location: [facility]  Surgeon: Dr. Becker  Surgery Date: 3/16/21  Time of Surgery: 12:15  Where patient plans to recover: At home with family  Fax number for surgical facility: Note does not need to be faxed, will be available electronically in Epic.    Type of Anesthesia Anticipated: to be determined    Assessment & Plan     The proposed surgical procedure is considered INTERMEDIATE risk.    Preop general physical exam  preop instructions reviewed.  Continue with procedure as planned  - EKG 12-lead complete w/read - Clinics    Primary localized osteoarthritis of left hip  Refill pain medication   - traMADol (ULTRAM) 50 MG tablet; Take 1 tablet (50 mg) by mouth 2 times daily as needed for moderate to severe pain    Takotsubo cardiomyopathy  Resolved, normal ECHO    Mood disorder (H)  controlled       Risks and Recommendations:  The patient has the following additional risks and recommendations for perioperative complications:   - No identified additional risk factors other than previously addressed    Medication Instructions:  Patient is to take all scheduled medications on the day of surgery    RECOMMENDATION:  APPROVAL GIVEN to proceed with proposed procedure, without further diagnostic evaluation.      30 minutes spent on the date of the encounter doing chart review, history and exam, documentation and further activities as noted above        Subjective     HPI related to upcoming procedure: Pain in hip so bad that it is 24x7 and \"nothing that I can do\". Tramadol at night and takes the edge off a " little.  Is out of that and using tylenol now and would like refill.      Preop Questions 3/5/2021   1. Have you ever had a heart attack or stroke? YES - Takotsubo in 2015.  ECHO 6/10 with normal LV function.  Had normal cath in 2015   2. Have you ever had surgery on your heart or blood vessels, such as a stent placement, a coronary artery bypass, or surgery on an artery in your head, neck, heart, or legs? No   3. Do you have chest pain with activity? No   4. Do you have a history of  heart failure? No   5. Do you currently have a cold, bronchitis or symptoms of other infection? No   6. Do you have a cough, shortness of breath, or wheezing? No   7. Do you or anyone in your family have previous history of blood clots? No   8. Do you or does anyone in your family have a serious bleeding problem such as prolonged bleeding following surgeries or cuts? No   9. Have you ever had problems with anemia or been told to take iron pills? No   10. Have you had any abnormal blood loss such as black, tarry or bloody stools, or abnormal vaginal bleeding? No   11. Have you ever had a blood transfusion? No   12. Are you willing to have a blood transfusion if it is medically needed before, during, or after your surgery? Yes   13. Have you or any of your relatives ever had problems with anesthesia? No   14. Do you have sleep apnea, excessive snoring or daytime drowsiness? No   15. Do you have any artifical heart valves or other implanted medical devices like a pacemaker, defibrillator, or continuous glucose monitor? No   16. Do you have artificial joints? No   17. Are you allergic to latex? No   18. Is there any chance that you may be pregnant? -       Health Care Directive:  Patient does not have a Health Care Directive or Living Will: Discussed advance care planning with patient; information given to patient to review.    Preoperative Review of :   reviewed - controlled substances reflected in medication list.      Review of  Systems  CONSTITUTIONAL: NEGATIVE for fever, chills, change in weight  ENT/MOUTH: NEGATIVE for ear, mouth and throat problems  RESP: NEGATIVE for significant cough or SOB  CV: NEGATIVE for chest pain, palpitations or peripheral edema  GI: NEGATIVE for nausea, abdominal pain, heartburn, or change in bowel habits  MUSCULOSKELETAL: see HPI, otherwise negative   PSYCHIATRIC: NEGATIVE for changes in mood or affect    Patient Active Problem List    Diagnosis Date Noted     Primary localized osteoarthritis of left hip 03/01/2021     Priority: Medium     Added automatically from request for surgery [id number]       Mood disorder (H) 02/26/2021     Priority: Medium     Takotsubo cardiomyopathy 02/27/2020     Priority: Medium     In 2015. Admitted to [facility], underwent cath with minimal coronary artery disease.  Subsequently completely resolved.        Anxiety 02/27/2020     Priority: Medium     Osteopenia of multiple sites 02/27/2020     Priority: Medium     History of vertebral fracture, s/p vertebroplasty       Claw toe, acquired 04/18/2018     Priority: Medium      Past Medical History:   Diagnosis Date     Anxiety      Breast cancer (H)      Hx of radiation therapy      MI, old      Osteoarthritis     hips     Osteopenia      Peripheral vertigo      PONV (postoperative nausea and vomiting)      Stress-induced cardiomyopathy      T12 compression fracture (H)      Past Surgical History:   Procedure Laterality Date     BREAST SURGERY      breast biopsy, breast lumpectomy     COSMETIC SURGERY      augmentation mammaplasty     GYN SURGERY      hysterectomy     ORTHOPEDIC SURGERY Bilateral     bunionectomy/10 toes repaired     REPAIR HAMMER TOE Bilateral 4/18/2018    Procedure: REPAIR HAMMER TOE;  RIGHT SECOND, FOURTH, AND FIFTH  MALLET TOE RECONSTRUCTION WITH LEFT THIRD CLAWTOE RECONSTRUCTION (CHOICE)  ;  Surgeon: Edgar Carlisle MD;  Location: [facility] OR     WRIST SURGERY Left      Current Outpatient Medications   Medication Sig  "Dispense Refill     ASPIRIN PO Take 81 mg by mouth daily       carvedilol (COREG) 6.25 MG tablet Take 1 tablet (6.25 mg) by mouth 2 times daily (with meals) 180 tablet 3     citalopram (CELEXA) 20 MG tablet Take 1 tablet (20 mg) by mouth daily 90 tablet 3     diclofenac (VOLTAREN) 1 % topical gel Place 4 g onto the skin 3 times daily as needed for moderate pain 1 Tube 3     naproxen (NAPROSYN) 250 MG tablet Take 1 tablet (250 mg) by mouth 2 times daily as needed for moderate pain 180 tablet 1     traMADol (ULTRAM) 50 MG tablet Take 1 tablet (50 mg) by mouth 2 times daily as needed for moderate to severe pain 28 tablet 0     No Known Allergies     Social History     Tobacco Use     Smoking status: Former Smoker     Smokeless tobacco: Never Used   Substance Use Topics     Alcohol use: No       History   Drug Use No         Objective     /72 (BP Location: Right arm, Patient Position: Chair, Cuff Size: Adult Regular)   Pulse 76   Temp 96.9  F (36.1  C) (Tympanic)   Resp 16   Ht 1.721 m (5' 7.75\")   Wt 70.2 kg (154 lb 12.8 oz)   SpO2 98%   BMI 23.71 kg/m      Physical Exam  GENERAL APPEARANCE: healthy, alert and no distress  EYES: Eyes grossly normal to inspection, PERRL and conjunctivae and sclerae normal  HENT: nose and mouth without ulcers or lesions  RESP: lungs clear to auscultation - no rales, rhonchi or wheezes  CV: regular rate and rhythm, normal S1 S2, no S3 or S4 and no murmur, click or rub   ABDOMEN: soft, nontender, no HSM or masses and bowel sounds normal  SKIN: no suspicious lesions or rashes  NEURO: Normal strength and tone, sensory exam grossly normal, mentation intact and speech normal    Recent Labs   Lab Test 08/05/20  0956      POTASSIUM 4.3   CR 0.75        Diagnostics:  Hemoglobin 15.3 2/20  No labs were ordered during this visit.   EKG: appears normal, NSR, normal axis, normal intervals, no acute ST/T changes c/w ischemia, no LVH by voltage criteria, unchanged from previous " tracings    Revised Cardiac Risk Index (RCRI):  The patient has the following serious cardiovascular risks for perioperative complications:   - No serious cardiac risks = 0 points     RCRI Interpretation: 0 points: Class I (very low risk - 0.4% complication rate)             Signed Electronically by: Lili Winter MD  Copy of this evaluation report is provided to requesting physician.    [facility] [employer]    [facility] Preop Guidelines    Revised Cardiac Risk Index   Answers for HPI/ROS submitted by the patient on 3/5/2021   LASHANDA 7 TOTAL SCORE: 3

## 2021-03-05 NOTE — PATIENT INSTRUCTIONS
Get your tetanus shot and pneumovax pneumonia shot at your pharmacy  or later.  Preparing for Your Surgery  Getting started  A nurse will call you to review your health history and instructions. They will give you an arrival time based on your scheduled surgery time.  Please be ready to share the following:    Your doctor's clinic name and phone number    Your medical, surgical and anesthesia history    A list of allergies and sensitivities    A list of medicines, including herbal treatments and over-the-counter drugs    Whether the patient has a legal guardian (ask how to send us the papers in advance)     Preparing for surgery    Within 30 days of surgery: Have a pre-op exam (sometimes called an H&P, or History and Physical). This can be done at a clinic or pre-operative center.  ? If you're having a , you may not need this exam. Talk to your care team    At your pre-op exam, talk to your care team about all medicines you take. If you need to stop any medicines before surgery, ask when to start taking them again.  ? We do this for your safety. Many medicines can make you bleed too much during surgery. Some change how well surgery (anesthesia) drugs work.    Call your insurance company to let them know you're having surgery. (If you don't have insurance, call [phone].)    Call your clinic if there's any change in your health. This includes signs of a cold or flu (sore throat, runny nose, cough, rash, fever). It also includes a scrape or scratch near the surgery site.    If you have questions on the day of surgery, call your hospital or surgery center.    Do not take aspirin, fish oil, gingko, ginseng or vitamin E for 1wk before surgery.  Do not take ibuprofen, advil, motrin, naprosyn, aleve for 5d before surgery.    Eating and drinking guidelines  For your safety: Unless your surgeon tells you otherwise, follow the guidelines below.    Eat and drink as usual until 8 hours before surgery.  After that, no food or milk.    Drink clear liquids until 2 hours before surgery. These are liquids you can see through, like water, Gatorade and Propel Water. You may also have black coffee and tea (no cream or milk).    Nothing by mouth within 2 hours of surgery. This includes gum, candy and breath mints.    If you drink, stop drinking alcohol the night before surgery.    If your care team tells you to take medicine on the morning of surgery, it's okay to take it with a sip of water.  Preventing infection    Shower or bathe the night before and morning of your surgery. Follow the instructions your clinic gave you. (If no instructions, use regular soap.)    Don't shave or clip hair near your surgery site. We'll remove the hair if needed.    Don't smoke or vape the morning of surgery. You may chew nicotine gum up to 2 hours before surgery. A nicotine patch is okay.  ? Note: Some surgeries require you to completely quit smoking and nicotine. Check with your surgeon.    Your care team will make every effort to keep you safe from infection. We will:  ? Clean our hands often with soap and water (or an alcohol-based hand rub).  ? Clean the skin at your surgery site with a special soap that kills germs.  ? Give you a special gown to keep you warm. (Cold raises the risk of infection.)  ? Wear special hair covers, masks, gowns and gloves during surgery.  ? Give antibiotic medicine, if prescribed. Not all surgeries need antibiotics.  What to bring on the day of surgery    Photo ID and insurance card    Copy of your health care directive, if you have one    Glasses and hearing aides (bring cases)  ? You can't wear contacts during surgery    Inhaler and eye drops, if you use them (tell us about these when you arrive)    CPAP machine or breathing device, if you use them    A few personal items, if spending the night    If you have . . .  ? A pacemaker or ICD (cardiac defibrillator): Bring the ID card.  ? An implanted  stimulator: Bring the remote control.  ? A legal guardian: Bring a copy of the certified (court-stamped) guardianship papers.  Please remove any jewelry, including body piercings. Leave jewelry and other valuables at home.  If you're going home the day of surgery  Important: If you don't follow the rules below, we must cancel your surgery.     Arrange for someone to drive you home after surgery. You may not drive, take a taxi or take public transportation by yourself (unless you'll have local anesthesia only).    Arrange for a responsible adult to stay with you overnight. If you don't, we may keep you in the hospital overnight, and you may need to pay the costs yourself.  Questions?   If you have any questions for your care team, list them here: _________________________________________________________________________________________________________________________________________________________________________________________________________________________________________________________________________________________________________________________  For informational purposes only. Not to replace the advice of your health care provider. Copyright   2003, 2019 [facility]. All rights reserved. Clinically reviewed by Gela Latham MD. [employer] [id number] - REV 4/20.

## 2021-03-06 ASSESSMENT — ANXIETY QUESTIONNAIRES: GAD7 TOTAL SCORE: 3

## 2021-03-12 DIAGNOSIS — Z11.59 ENCOUNTER FOR SCREENING FOR OTHER VIRAL DISEASES: ICD-10-CM

## 2021-03-12 LAB
LABORATORY COMMENT REPORT: NORMAL
SARS-COV-2 RNA RESP QL NAA+PROBE: NEGATIVE
SARS-COV-2 RNA RESP QL NAA+PROBE: NORMAL
SPECIMEN SOURCE: NORMAL
SPECIMEN SOURCE: NORMAL

## 2021-03-12 PROCEDURE — U0003 INFECTIOUS AGENT DETECTION BY NUCLEIC ACID (DNA OR RNA); SEVERE ACUTE RESPIRATORY SYNDROME CORONAVIRUS 2 (SARS-COV-2) (CORONAVIRUS DISEASE [COVID-19]), AMPLIFIED PROBE TECHNIQUE, MAKING USE OF HIGH THROUGHPUT TECHNOLOGIES AS DESCRIBED BY CMS-2020-01-R: HCPCS | Performed by: ORTHOPAEDIC SURGERY

## 2021-03-12 PROCEDURE — U0005 INFEC AGEN DETEC AMPLI PROBE: HCPCS | Performed by: ORTHOPAEDIC SURGERY

## 2021-03-15 NOTE — PHARMACY-ADMISSION MEDICATION HISTORY
Admission medication history interview status for this patient is complete. See [facility] admission navigator for allergy information, prior to admission medications and immunization status.     Med rec complete by preadmitting  Reviewed by Abril Lantigua RN (Registered Nurse) on 03/02/21 at 0843    No further clarifications needed      Prior to Admission medications    Medication Sig Last Dose Taking? Auth Provider   ASPIRIN PO Take 81 mg by mouth daily  Yes Reported, Patient   carvedilol (COREG) 6.25 MG tablet Take 1 tablet (6.25 mg) by mouth 2 times daily (with meals)  Yes Yoko Conde MD   citalopram (CELEXA) 20 MG tablet Take 1 tablet (20 mg) by mouth daily  Yes Yoko Conde MD   diclofenac (VOLTAREN) 1 % topical gel Place 4 g onto the skin 3 times daily as needed for moderate pain  Yes Yeo, Albert, MD   naproxen (NAPROSYN) 250 MG tablet Take 1 tablet (250 mg) by mouth 2 times daily as needed for moderate pain  Yes Yoko Conde MD   traMADol (ULTRAM) 50 MG tablet Take 1 tablet (50 mg) by mouth 2 times daily as needed for moderate to severe pain   Lili Winter MD

## 2021-03-16 ENCOUNTER — ANESTHESIA (OUTPATIENT)
Dept: SURGERY | Facility: CLINIC | Age: 76
End: 2021-03-16
Payer: COMMERCIAL

## 2021-03-16 ENCOUNTER — APPOINTMENT (OUTPATIENT)
Dept: GENERAL RADIOLOGY | Facility: CLINIC | Age: 76
End: 2021-03-16
Attending: PHYSICIAN ASSISTANT
Payer: COMMERCIAL

## 2021-03-16 ENCOUNTER — ANESTHESIA EVENT (OUTPATIENT)
Dept: SURGERY | Facility: CLINIC | Age: 76
End: 2021-03-16
Payer: COMMERCIAL

## 2021-03-16 ENCOUNTER — HOSPITAL ENCOUNTER (OUTPATIENT)
Facility: CLINIC | Age: 76
Discharge: HOME | End: 2021-03-18
Attending: ORTHOPAEDIC SURGERY | Admitting: ORTHOPAEDIC SURGERY
Payer: COMMERCIAL

## 2021-03-16 DIAGNOSIS — Z96.642 S/P HIP REPLACEMENT, LEFT: ICD-10-CM

## 2021-03-16 DIAGNOSIS — K59.03 DRUG-INDUCED CONSTIPATION: Primary | ICD-10-CM

## 2021-03-16 DIAGNOSIS — Z78.9 DEEP VEIN THROMBOSIS (DVT) PROPHYLAXIS PRESCRIBED AT DISCHARGE: ICD-10-CM

## 2021-03-16 DIAGNOSIS — M16.12 PRIMARY LOCALIZED OSTEOARTHRITIS OF LEFT HIP: ICD-10-CM

## 2021-03-16 LAB
ABO + RH BLD: NORMAL
ABO + RH BLD: NORMAL
BLD GP AB SCN SERPL QL: NORMAL
BLOOD BANK CMNT PATIENT-IMP: NORMAL
CREAT SERPL-MCNC: 0.84 MG/DL (ref 0.52–1.04)
GFR SERPL CREATININE-BSD FRML MDRD: 68 ML/MIN/{1.73_M2}
HGB BLD-MCNC: 13.9 G/DL (ref 11.7–15.7)
PLATELET # BLD AUTO: 163 10E9/L (ref 150–450)
SPECIMEN EXP DATE BLD: NORMAL

## 2021-03-16 PROCEDURE — 999N000065 XR PELVIS AND HIP PORTABLE LEFT 1 VIEW

## 2021-03-16 PROCEDURE — 27130 TOTAL HIP ARTHROPLASTY: CPT | Mod: LT | Performed by: ORTHOPAEDIC SURGERY

## 2021-03-16 PROCEDURE — 999N000141 HC STATISTIC PRE-PROCEDURE NURSING ASSESSMENT: Performed by: ORTHOPAEDIC SURGERY

## 2021-03-16 PROCEDURE — 250N000011 HC RX IP 250 OP 636: Performed by: NURSE ANESTHETIST, CERTIFIED REGISTERED

## 2021-03-16 PROCEDURE — 258N000003 HC RX IP 258 OP 636: Performed by: NURSE ANESTHETIST, CERTIFIED REGISTERED

## 2021-03-16 PROCEDURE — 82565 ASSAY OF CREATININE: CPT | Performed by: PHYSICIAN ASSISTANT

## 2021-03-16 PROCEDURE — 258N000003 HC RX IP 258 OP 636: Performed by: ANESTHESIOLOGY

## 2021-03-16 PROCEDURE — 250N000011 HC RX IP 250 OP 636: Performed by: ANESTHESIOLOGY

## 2021-03-16 PROCEDURE — 250N000013 HC RX MED GY IP 250 OP 250 PS 637: Performed by: ORTHOPAEDIC SURGERY

## 2021-03-16 PROCEDURE — 370N000017 HC ANESTHESIA TECHNICAL FEE, PER MIN: Performed by: ORTHOPAEDIC SURGERY

## 2021-03-16 PROCEDURE — 36415 COLL VENOUS BLD VENIPUNCTURE: CPT | Performed by: PHYSICIAN ASSISTANT

## 2021-03-16 PROCEDURE — 250N000009 HC RX 250: Performed by: ANESTHESIOLOGY

## 2021-03-16 PROCEDURE — 85018 HEMOGLOBIN: CPT | Performed by: ANESTHESIOLOGY

## 2021-03-16 PROCEDURE — 86900 BLOOD TYPING SEROLOGIC ABO: CPT | Performed by: ANESTHESIOLOGY

## 2021-03-16 PROCEDURE — 85049 AUTOMATED PLATELET COUNT: CPT | Performed by: PHYSICIAN ASSISTANT

## 2021-03-16 PROCEDURE — 360N000077 HC SURGERY LEVEL 4, PER MIN: Performed by: ORTHOPAEDIC SURGERY

## 2021-03-16 PROCEDURE — 258N000003 HC RX IP 258 OP 636: Performed by: PHYSICIAN ASSISTANT

## 2021-03-16 PROCEDURE — 250N000011 HC RX IP 250 OP 636: Performed by: PHYSICIAN ASSISTANT

## 2021-03-16 PROCEDURE — 258N000001 HC RX 258: Performed by: ORTHOPAEDIC SURGERY

## 2021-03-16 PROCEDURE — 250N000009 HC RX 250: Performed by: NURSE ANESTHETIST, CERTIFIED REGISTERED

## 2021-03-16 PROCEDURE — 27130 TOTAL HIP ARTHROPLASTY: CPT | Mod: AS | Performed by: PHYSICIAN ASSISTANT

## 2021-03-16 PROCEDURE — 250N000009 HC RX 250: Performed by: ORTHOPAEDIC SURGERY

## 2021-03-16 PROCEDURE — 36415 COLL VENOUS BLD VENIPUNCTURE: CPT | Performed by: ANESTHESIOLOGY

## 2021-03-16 PROCEDURE — 250N000013 HC RX MED GY IP 250 OP 250 PS 637: Performed by: ANESTHESIOLOGY

## 2021-03-16 PROCEDURE — 86901 BLOOD TYPING SEROLOGIC RH(D): CPT | Performed by: ANESTHESIOLOGY

## 2021-03-16 PROCEDURE — 272N000001 HC OR GENERAL SUPPLY STERILE: Performed by: ORTHOPAEDIC SURGERY

## 2021-03-16 PROCEDURE — 258N000003 HC RX IP 258 OP 636: Performed by: ORTHOPAEDIC SURGERY

## 2021-03-16 PROCEDURE — 250N000011 HC RX IP 250 OP 636: Performed by: ORTHOPAEDIC SURGERY

## 2021-03-16 PROCEDURE — 710N000009 HC RECOVERY PHASE 1, LEVEL 1, PER MIN: Performed by: ORTHOPAEDIC SURGERY

## 2021-03-16 PROCEDURE — 86850 RBC ANTIBODY SCREEN: CPT | Performed by: ANESTHESIOLOGY

## 2021-03-16 PROCEDURE — C1776 JOINT DEVICE (IMPLANTABLE): HCPCS | Performed by: ORTHOPAEDIC SURGERY

## 2021-03-16 PROCEDURE — 250N000013 HC RX MED GY IP 250 OP 250 PS 637: Performed by: PHYSICIAN ASSISTANT

## 2021-03-16 DEVICE — IMPLANTABLE DEVICE: Type: IMPLANTABLE DEVICE | Site: HIP | Status: FUNCTIONAL

## 2021-03-16 RX ORDER — SODIUM CHLORIDE, SODIUM LACTATE, POTASSIUM CHLORIDE, CALCIUM CHLORIDE 600; 310; 30; 20 MG/100ML; MG/100ML; MG/100ML; MG/100ML
INJECTION, SOLUTION INTRAVENOUS CONTINUOUS
Status: DISCONTINUED | OUTPATIENT
Start: 2021-03-16 | End: 2021-03-16 | Stop reason: HOSPADM

## 2021-03-16 RX ORDER — NALOXONE HYDROCHLORIDE 0.4 MG/ML
0.4 INJECTION, SOLUTION INTRAMUSCULAR; INTRAVENOUS; SUBCUTANEOUS
Status: DISCONTINUED | OUTPATIENT
Start: 2021-03-16 | End: 2021-03-16 | Stop reason: HOSPADM

## 2021-03-16 RX ORDER — SCOLOPAMINE TRANSDERMAL SYSTEM 1 MG/1
1 PATCH, EXTENDED RELEASE TRANSDERMAL
Status: DISCONTINUED | OUTPATIENT
Start: 2021-03-16 | End: 2021-03-16

## 2021-03-16 RX ORDER — ONDANSETRON 4 MG/1
4 TABLET, ORALLY DISINTEGRATING ORAL EVERY 6 HOURS PRN
Status: DISCONTINUED | OUTPATIENT
Start: 2021-03-16 | End: 2021-03-18 | Stop reason: HOSPADM

## 2021-03-16 RX ORDER — ONDANSETRON 2 MG/ML
INJECTION INTRAMUSCULAR; INTRAVENOUS PRN
Status: DISCONTINUED | OUTPATIENT
Start: 2021-03-16 | End: 2021-03-16

## 2021-03-16 RX ORDER — NEOSTIGMINE METHYLSULFATE 1 MG/ML
VIAL (ML) INJECTION PRN
Status: DISCONTINUED | OUTPATIENT
Start: 2021-03-16 | End: 2021-03-16

## 2021-03-16 RX ORDER — NAPROXEN 250 MG/1
250 TABLET ORAL EVERY 12 HOURS PRN
Status: DISCONTINUED | OUTPATIENT
Start: 2021-03-16 | End: 2021-03-18 | Stop reason: HOSPADM

## 2021-03-16 RX ORDER — NALOXONE HYDROCHLORIDE 0.4 MG/ML
0.2 INJECTION, SOLUTION INTRAMUSCULAR; INTRAVENOUS; SUBCUTANEOUS
Status: DISCONTINUED | OUTPATIENT
Start: 2021-03-16 | End: 2021-03-18 | Stop reason: HOSPADM

## 2021-03-16 RX ORDER — LIDOCAINE 40 MG/G
CREAM TOPICAL
Status: DISCONTINUED | OUTPATIENT
Start: 2021-03-16 | End: 2021-03-16 | Stop reason: HOSPADM

## 2021-03-16 RX ORDER — MEPERIDINE HYDROCHLORIDE 25 MG/ML
12.5 INJECTION INTRAMUSCULAR; INTRAVENOUS; SUBCUTANEOUS
Status: DISCONTINUED | OUTPATIENT
Start: 2021-03-16 | End: 2021-03-16 | Stop reason: HOSPADM

## 2021-03-16 RX ORDER — ACETAMINOPHEN 650 MG
TABLET, EXTENDED RELEASE ORAL PRN
Status: DISCONTINUED | OUTPATIENT
Start: 2021-03-16 | End: 2021-03-16 | Stop reason: HOSPADM

## 2021-03-16 RX ORDER — FENTANYL CITRATE 50 UG/ML
25-50 INJECTION, SOLUTION INTRAMUSCULAR; INTRAVENOUS
Status: DISCONTINUED | OUTPATIENT
Start: 2021-03-16 | End: 2021-03-16 | Stop reason: HOSPADM

## 2021-03-16 RX ORDER — ALBUTEROL SULFATE 0.83 MG/ML
2.5 SOLUTION RESPIRATORY (INHALATION) EVERY 4 HOURS PRN
Status: DISCONTINUED | OUTPATIENT
Start: 2021-03-16 | End: 2021-03-16 | Stop reason: HOSPADM

## 2021-03-16 RX ORDER — HYDROMORPHONE HYDROCHLORIDE 1 MG/ML
.3-.5 INJECTION, SOLUTION INTRAMUSCULAR; INTRAVENOUS; SUBCUTANEOUS EVERY 10 MIN PRN
Status: DISCONTINUED | OUTPATIENT
Start: 2021-03-16 | End: 2021-03-16 | Stop reason: HOSPADM

## 2021-03-16 RX ORDER — ACETAMINOPHEN 325 MG/1
975 TABLET ORAL ONCE
Status: COMPLETED | OUTPATIENT
Start: 2021-03-16 | End: 2021-03-16

## 2021-03-16 RX ORDER — DOCUSATE SODIUM 100 MG/1
100 CAPSULE, LIQUID FILLED ORAL 2 TIMES DAILY
Status: DISCONTINUED | OUTPATIENT
Start: 2021-03-16 | End: 2021-03-18 | Stop reason: HOSPADM

## 2021-03-16 RX ORDER — NALOXONE HYDROCHLORIDE 0.4 MG/ML
0.4 INJECTION, SOLUTION INTRAMUSCULAR; INTRAVENOUS; SUBCUTANEOUS
Status: DISCONTINUED | OUTPATIENT
Start: 2021-03-16 | End: 2021-03-18 | Stop reason: HOSPADM

## 2021-03-16 RX ORDER — LIDOCAINE HYDROCHLORIDE 10 MG/ML
INJECTION, SOLUTION INFILTRATION; PERINEURAL PRN
Status: DISCONTINUED | OUTPATIENT
Start: 2021-03-16 | End: 2021-03-16

## 2021-03-16 RX ORDER — CEFAZOLIN SODIUM 2 G/100ML
2 INJECTION, SOLUTION INTRAVENOUS
Status: DISCONTINUED | OUTPATIENT
Start: 2021-03-16 | End: 2021-03-16 | Stop reason: HOSPADM

## 2021-03-16 RX ORDER — HYDROMORPHONE HYDROCHLORIDE 1 MG/ML
0.4 INJECTION, SOLUTION INTRAMUSCULAR; INTRAVENOUS; SUBCUTANEOUS
Status: DISCONTINUED | OUTPATIENT
Start: 2021-03-16 | End: 2021-03-18 | Stop reason: HOSPADM

## 2021-03-16 RX ORDER — NALOXONE HYDROCHLORIDE 0.4 MG/ML
0.2 INJECTION, SOLUTION INTRAMUSCULAR; INTRAVENOUS; SUBCUTANEOUS
Status: DISCONTINUED | OUTPATIENT
Start: 2021-03-16 | End: 2021-03-16 | Stop reason: HOSPADM

## 2021-03-16 RX ORDER — PROPOFOL 10 MG/ML
INJECTION, EMULSION INTRAVENOUS PRN
Status: DISCONTINUED | OUTPATIENT
Start: 2021-03-16 | End: 2021-03-16

## 2021-03-16 RX ORDER — SODIUM CHLORIDE, SODIUM LACTATE, POTASSIUM CHLORIDE, CALCIUM CHLORIDE 600; 310; 30; 20 MG/100ML; MG/100ML; MG/100ML; MG/100ML
INJECTION, SOLUTION INTRAVENOUS CONTINUOUS
Status: DISCONTINUED | OUTPATIENT
Start: 2021-03-16 | End: 2021-03-18 | Stop reason: HOSPADM

## 2021-03-16 RX ORDER — HYDROMORPHONE HYDROCHLORIDE 4 MG/1
4 TABLET ORAL EVERY 4 HOURS PRN
Status: DISCONTINUED | OUTPATIENT
Start: 2021-03-16 | End: 2021-03-18 | Stop reason: HOSPADM

## 2021-03-16 RX ORDER — KETAMINE HYDROCHLORIDE 10 MG/ML
INJECTION INTRAMUSCULAR; INTRAVENOUS PRN
Status: DISCONTINUED | OUTPATIENT
Start: 2021-03-16 | End: 2021-03-16

## 2021-03-16 RX ORDER — BISACODYL 10 MG
10 SUPPOSITORY, RECTAL RECTAL DAILY PRN
Status: DISCONTINUED | OUTPATIENT
Start: 2021-03-16 | End: 2021-03-18 | Stop reason: HOSPADM

## 2021-03-16 RX ORDER — AMOXICILLIN 250 MG
1 CAPSULE ORAL 2 TIMES DAILY
Status: DISCONTINUED | OUTPATIENT
Start: 2021-03-16 | End: 2021-03-18 | Stop reason: HOSPADM

## 2021-03-16 RX ORDER — POLYETHYLENE GLYCOL 3350 17 G/17G
17 POWDER, FOR SOLUTION ORAL DAILY
Status: DISCONTINUED | OUTPATIENT
Start: 2021-03-17 | End: 2021-03-18 | Stop reason: HOSPADM

## 2021-03-16 RX ORDER — HYDROXYZINE HYDROCHLORIDE 10 MG/1
10 TABLET, FILM COATED ORAL EVERY 6 HOURS PRN
Status: DISCONTINUED | OUTPATIENT
Start: 2021-03-16 | End: 2021-03-18 | Stop reason: HOSPADM

## 2021-03-16 RX ORDER — LIDOCAINE HYDROCHLORIDE 40 MG/ML
SOLUTION TOPICAL PRN
Status: DISCONTINUED | OUTPATIENT
Start: 2021-03-16 | End: 2021-03-16

## 2021-03-16 RX ORDER — FENTANYL CITRATE 50 UG/ML
INJECTION, SOLUTION INTRAMUSCULAR; INTRAVENOUS PRN
Status: DISCONTINUED | OUTPATIENT
Start: 2021-03-16 | End: 2021-03-16

## 2021-03-16 RX ORDER — HYDROMORPHONE HYDROCHLORIDE 2 MG/1
2 TABLET ORAL EVERY 4 HOURS PRN
Status: DISCONTINUED | OUTPATIENT
Start: 2021-03-16 | End: 2021-03-18 | Stop reason: HOSPADM

## 2021-03-16 RX ORDER — EPHEDRINE SULFATE 50 MG/ML
INJECTION, SOLUTION INTRAMUSCULAR; INTRAVENOUS; SUBCUTANEOUS PRN
Status: DISCONTINUED | OUTPATIENT
Start: 2021-03-16 | End: 2021-03-16

## 2021-03-16 RX ORDER — ONDANSETRON 4 MG/1
4 TABLET, ORALLY DISINTEGRATING ORAL EVERY 30 MIN PRN
Status: DISCONTINUED | OUTPATIENT
Start: 2021-03-16 | End: 2021-03-16 | Stop reason: HOSPADM

## 2021-03-16 RX ORDER — ONDANSETRON 2 MG/ML
4 INJECTION INTRAMUSCULAR; INTRAVENOUS EVERY 6 HOURS PRN
Status: DISCONTINUED | OUTPATIENT
Start: 2021-03-16 | End: 2021-03-18 | Stop reason: HOSPADM

## 2021-03-16 RX ORDER — CEFAZOLIN SODIUM 2 G/100ML
2 INJECTION, SOLUTION INTRAVENOUS SEE ADMIN INSTRUCTIONS
Status: DISCONTINUED | OUTPATIENT
Start: 2021-03-16 | End: 2021-03-16 | Stop reason: HOSPADM

## 2021-03-16 RX ORDER — NALOXONE HYDROCHLORIDE 0.4 MG/ML
INJECTION, SOLUTION INTRAMUSCULAR; INTRAVENOUS; SUBCUTANEOUS PRN
Status: DISCONTINUED | OUTPATIENT
Start: 2021-03-16 | End: 2021-03-16

## 2021-03-16 RX ORDER — ACETAMINOPHEN 325 MG/1
650 TABLET ORAL EVERY 4 HOURS PRN
Status: DISCONTINUED | OUTPATIENT
Start: 2021-03-19 | End: 2021-03-18 | Stop reason: HOSPADM

## 2021-03-16 RX ORDER — HYDRALAZINE HYDROCHLORIDE 20 MG/ML
2.5-5 INJECTION INTRAMUSCULAR; INTRAVENOUS EVERY 10 MIN PRN
Status: DISCONTINUED | OUTPATIENT
Start: 2021-03-16 | End: 2021-03-16 | Stop reason: HOSPADM

## 2021-03-16 RX ORDER — PROCHLORPERAZINE MALEATE 5 MG
5 TABLET ORAL EVERY 6 HOURS PRN
Status: DISCONTINUED | OUTPATIENT
Start: 2021-03-16 | End: 2021-03-18 | Stop reason: HOSPADM

## 2021-03-16 RX ORDER — CARVEDILOL 6.25 MG/1
6.25 TABLET ORAL 2 TIMES DAILY WITH MEALS
Status: DISCONTINUED | OUTPATIENT
Start: 2021-03-16 | End: 2021-03-18 | Stop reason: HOSPADM

## 2021-03-16 RX ORDER — TRANEXAMIC ACID 650 MG/1
1950 TABLET ORAL ONCE
Status: COMPLETED | OUTPATIENT
Start: 2021-03-16 | End: 2021-03-16

## 2021-03-16 RX ORDER — ACETAMINOPHEN 325 MG/1
975 TABLET ORAL EVERY 8 HOURS
Status: DISCONTINUED | OUTPATIENT
Start: 2021-03-16 | End: 2021-03-18 | Stop reason: HOSPADM

## 2021-03-16 RX ORDER — CITALOPRAM HYDROBROMIDE 20 MG/1
20 TABLET ORAL DAILY
Status: DISCONTINUED | OUTPATIENT
Start: 2021-03-17 | End: 2021-03-18 | Stop reason: HOSPADM

## 2021-03-16 RX ORDER — ONDANSETRON 2 MG/ML
4 INJECTION INTRAMUSCULAR; INTRAVENOUS EVERY 30 MIN PRN
Status: DISCONTINUED | OUTPATIENT
Start: 2021-03-16 | End: 2021-03-16 | Stop reason: HOSPADM

## 2021-03-16 RX ORDER — OXYCODONE HCL 10 MG/1
10 TABLET, FILM COATED, EXTENDED RELEASE ORAL ONCE
Status: COMPLETED | OUTPATIENT
Start: 2021-03-16 | End: 2021-03-16

## 2021-03-16 RX ORDER — LIDOCAINE 40 MG/G
CREAM TOPICAL
Status: DISCONTINUED | OUTPATIENT
Start: 2021-03-16 | End: 2021-03-18 | Stop reason: HOSPADM

## 2021-03-16 RX ORDER — GLYCOPYRROLATE 0.2 MG/ML
INJECTION, SOLUTION INTRAMUSCULAR; INTRAVENOUS PRN
Status: DISCONTINUED | OUTPATIENT
Start: 2021-03-16 | End: 2021-03-16

## 2021-03-16 RX ORDER — CEFAZOLIN SODIUM 1 G/3ML
1 INJECTION, POWDER, FOR SOLUTION INTRAMUSCULAR; INTRAVENOUS EVERY 8 HOURS
Status: COMPLETED | OUTPATIENT
Start: 2021-03-16 | End: 2021-03-17

## 2021-03-16 RX ORDER — DEXAMETHASONE SODIUM PHOSPHATE 4 MG/ML
INJECTION, SOLUTION INTRA-ARTICULAR; INTRALESIONAL; INTRAMUSCULAR; INTRAVENOUS; SOFT TISSUE PRN
Status: DISCONTINUED | OUTPATIENT
Start: 2021-03-16 | End: 2021-03-16

## 2021-03-16 RX ORDER — HYDROMORPHONE HCL IN WATER/PF 6 MG/30 ML
0.2 PATIENT CONTROLLED ANALGESIA SYRINGE INTRAVENOUS
Status: DISCONTINUED | OUTPATIENT
Start: 2021-03-16 | End: 2021-03-18 | Stop reason: HOSPADM

## 2021-03-16 RX ORDER — ACETAMINOPHEN 325 MG/1
650 TABLET ORAL ONCE
Status: COMPLETED | OUTPATIENT
Start: 2021-03-16 | End: 2021-03-16

## 2021-03-16 RX ORDER — LABETALOL 20 MG/4 ML (5 MG/ML) INTRAVENOUS SYRINGE
10
Status: DISCONTINUED | OUTPATIENT
Start: 2021-03-16 | End: 2021-03-16 | Stop reason: HOSPADM

## 2021-03-16 RX ADMIN — TRANEXAMIC ACID 1950 MG: 650 TABLET ORAL at 12:33

## 2021-03-16 RX ADMIN — PROPOFOL 120 MG: 10 INJECTION, EMULSION INTRAVENOUS at 14:31

## 2021-03-16 RX ADMIN — SODIUM CHLORIDE, POTASSIUM CHLORIDE, SODIUM LACTATE AND CALCIUM CHLORIDE: 600; 310; 30; 20 INJECTION, SOLUTION INTRAVENOUS at 22:29

## 2021-03-16 RX ADMIN — FENTANYL CITRATE 50 MCG: 50 INJECTION, SOLUTION INTRAMUSCULAR; INTRAVENOUS at 16:22

## 2021-03-16 RX ADMIN — FENTANYL CITRATE 50 MCG: 50 INJECTION, SOLUTION INTRAMUSCULAR; INTRAVENOUS at 16:26

## 2021-03-16 RX ADMIN — GLYCOPYRROLATE 0.8 MG: 0.2 INJECTION, SOLUTION INTRAMUSCULAR; INTRAVENOUS at 15:51

## 2021-03-16 RX ADMIN — FENTANYL CITRATE 50 MCG: 50 INJECTION, SOLUTION INTRAMUSCULAR; INTRAVENOUS at 14:58

## 2021-03-16 RX ADMIN — HYDROMORPHONE HYDROCHLORIDE 0.5 MG: 1 INJECTION, SOLUTION INTRAMUSCULAR; INTRAVENOUS; SUBCUTANEOUS at 16:34

## 2021-03-16 RX ADMIN — ROCURONIUM BROMIDE 50 MG: 10 INJECTION INTRAVENOUS at 14:33

## 2021-03-16 RX ADMIN — NEOSTIGMINE METHYLSULFATE 4 MG: 1 INJECTION, SOLUTION INTRAVENOUS at 15:51

## 2021-03-16 RX ADMIN — SCOPALAMINE 1 PATCH: 1 PATCH, EXTENDED RELEASE TRANSDERMAL at 13:41

## 2021-03-16 RX ADMIN — ACETAMINOPHEN 975 MG: 325 TABLET, FILM COATED ORAL at 22:29

## 2021-03-16 RX ADMIN — OXYCODONE HYDROCHLORIDE 10 MG: 10 TABLET, FILM COATED, EXTENDED RELEASE ORAL at 12:32

## 2021-03-16 RX ADMIN — DEXAMETHASONE SODIUM PHOSPHATE 4 MG: 4 INJECTION, SOLUTION INTRA-ARTICULAR; INTRALESIONAL; INTRAMUSCULAR; INTRAVENOUS; SOFT TISSUE at 14:31

## 2021-03-16 RX ADMIN — HYDROMORPHONE HYDROCHLORIDE 0.5 MG: 1 INJECTION, SOLUTION INTRAMUSCULAR; INTRAVENOUS; SUBCUTANEOUS at 15:16

## 2021-03-16 RX ADMIN — ONDANSETRON HYDROCHLORIDE 4 MG: 2 INJECTION, SOLUTION INTRAVENOUS at 15:42

## 2021-03-16 RX ADMIN — SODIUM CHLORIDE, POTASSIUM CHLORIDE, SODIUM LACTATE AND CALCIUM CHLORIDE: 600; 310; 30; 20 INJECTION, SOLUTION INTRAVENOUS at 14:28

## 2021-03-16 RX ADMIN — Medication 5 MG: at 15:40

## 2021-03-16 RX ADMIN — PHENYLEPHRINE HYDROCHLORIDE 200 MCG: 10 INJECTION INTRAVENOUS at 14:38

## 2021-03-16 RX ADMIN — HYDROMORPHONE HYDROCHLORIDE 0.5 MG: 1 INJECTION, SOLUTION INTRAMUSCULAR; INTRAVENOUS; SUBCUTANEOUS at 15:14

## 2021-03-16 RX ADMIN — Medication 5 MG: at 14:38

## 2021-03-16 RX ADMIN — FENTANYL CITRATE 150 MCG: 50 INJECTION, SOLUTION INTRAMUSCULAR; INTRAVENOUS at 14:31

## 2021-03-16 RX ADMIN — ACETAMINOPHEN 975 MG: 325 TABLET, FILM COATED ORAL at 12:33

## 2021-03-16 RX ADMIN — FENTANYL CITRATE 50 MCG: 50 INJECTION, SOLUTION INTRAMUSCULAR; INTRAVENOUS at 14:56

## 2021-03-16 RX ADMIN — CEFAZOLIN 1 G: 1 INJECTION, POWDER, FOR SOLUTION INTRAMUSCULAR; INTRAVENOUS at 22:30

## 2021-03-16 RX ADMIN — SODIUM CHLORIDE, POTASSIUM CHLORIDE, SODIUM LACTATE AND CALCIUM CHLORIDE: 600; 310; 30; 20 INJECTION, SOLUTION INTRAVENOUS at 15:48

## 2021-03-16 RX ADMIN — NALOXONE HYDROCHLORIDE 0.08 MG: 0.4 INJECTION, SOLUTION INTRAMUSCULAR; INTRAVENOUS; SUBCUTANEOUS at 16:04

## 2021-03-16 RX ADMIN — LIDOCAINE HYDROCHLORIDE 50 MG: 10 INJECTION, SOLUTION INFILTRATION; PERINEURAL at 14:31

## 2021-03-16 RX ADMIN — Medication 5 MG: at 15:27

## 2021-03-16 RX ADMIN — NALOXONE HYDROCHLORIDE 0.04 MG: 0.4 INJECTION, SOLUTION INTRAMUSCULAR; INTRAVENOUS; SUBCUTANEOUS at 16:06

## 2021-03-16 RX ADMIN — CARVEDILOL 6.25 MG: 6.25 TABLET, FILM COATED ORAL at 18:49

## 2021-03-16 RX ADMIN — HYDROMORPHONE HYDROCHLORIDE 0.5 MG: 1 INJECTION, SOLUTION INTRAMUSCULAR; INTRAVENOUS; SUBCUTANEOUS at 16:48

## 2021-03-16 RX ADMIN — LIDOCAINE HYDROCHLORIDE 4 ML: 40 SOLUTION TOPICAL at 14:34

## 2021-03-16 RX ADMIN — CEFAZOLIN SODIUM 2 G: 2 INJECTION, SOLUTION INTRAVENOUS at 14:31

## 2021-03-16 RX ADMIN — ACETAMINOPHEN 650 MG: 325 TABLET, FILM COATED ORAL at 17:39

## 2021-03-16 RX ADMIN — Medication 35 MG: at 15:03

## 2021-03-16 RX ADMIN — PROPOFOL 40 MG: 10 INJECTION, EMULSION INTRAVENOUS at 15:04

## 2021-03-16 RX ADMIN — MIDAZOLAM 1 MG: 1 INJECTION INTRAMUSCULAR; INTRAVENOUS at 14:28

## 2021-03-16 ASSESSMENT — LIFESTYLE VARIABLES: TOBACCO_USE: 1

## 2021-03-16 ASSESSMENT — MIFFLIN-ST. JEOR: SCORE: 1226.17

## 2021-03-16 NOTE — ANESTHESIA CARE TRANSFER NOTE
Patient: Kandis Tse    Procedure(s):  Left total hip arthroplasty    Diagnosis: Primary localized osteoarthritis of left hip [M16.12]  Diagnosis Additional Information: No value filed.    Anesthesia Type:   General     Note:    Oropharynx: spontaneously breathing  Level of Consciousness: awake  Oxygen Supplementation: face mask        Vital Signs Stable: post-procedure vital signs reviewed and stable  Report to RN Given: handoff report given  Patient transferred to: PACU  Comments: To PACU, oxygen per face mask, report to RN.        Vitals: (Last set prior to Anesthesia Care Transfer)  CRNA VITALS  3/16/2021 1537 - 3/16/2021 1614      3/16/2021             Pulse:  (!) 47    SpO2:  (!) 84 %        Electronically Signed By: KELLIE Bledsoe CRNA  March 16, 2021  4:14 PM

## 2021-03-16 NOTE — ANESTHESIA PROCEDURE NOTES
Airway   Date/Time: 3/16/2021 2:35 PM   Patient location during procedure: OR  Staff -   Anesthesiologist:  Chitra Whiting APRN CRNA  Performed By: CRNA    Consent for Airway   Urgency: elective    Indications and Patient Condition  Indications for airway management: agusto-procedural  Induction type:intravenousMask difficulty assessment: 1 - vent by mask    Final Airway Details  Final airway type: endotracheal airway  Successful airway:ETT - single  Endotracheal Airway Details   ETT size (mm): 7.0  Cuffed: yes  Successful intubation technique: direct laryngoscopy  Grade View of Cords: 1  Adjucts: stylet  Secured with: plastic tape  Bite block used: Soft    Post intubation assessment   Placement verified by: capnometry   Number of attempts at approach: 1  Secured with:plastic tape  Dentition: Intact

## 2021-03-16 NOTE — ANESTHESIA PREPROCEDURE EVALUATION
Anesthesia Pre-Procedure Evaluation    Patient: Kandis Tse   MRN: [id number] : 1945        Preoperative Diagnosis: Primary localized osteoarthritis of left hip [M16.12]   Procedure : Procedure(s):  Left total hip arthroplasty     Past Medical History:   Diagnosis Date     Anxiety      Breast cancer (H)      Hx of radiation therapy      MI, old      Osteoarthritis     hips     Osteopenia      Peripheral vertigo      PONV (postoperative nausea and vomiting)      Stress-induced cardiomyopathy      T12 compression fracture (H)       Past Surgical History:   Procedure Laterality Date     BREAST SURGERY      breast biopsy, breast lumpectomy     COSMETIC SURGERY      augmentation mammaplasty     GYN SURGERY      hysterectomy     ORTHOPEDIC SURGERY Bilateral     bunionectomy/10 toes repaired     REPAIR HAMMER TOE Bilateral 2018    Procedure: REPAIR HAMMER TOE;  RIGHT SECOND, FOURTH, AND FIFTH  MALLET TOE RECONSTRUCTION WITH LEFT THIRD CLAWTOE RECONSTRUCTION (CHOICE)  ;  Surgeon: Edgar Carlisle MD;  Location: [facility] OR     WRIST SURGERY Left       No Known Allergies   Social History     Tobacco Use     Smoking status: Former Smoker     Smokeless tobacco: Never Used   Substance Use Topics     Alcohol use: No      Wt Readings from Last 1 Encounters:   21 69.9 kg (154 lb)        Anesthesia Evaluation   Pt has had prior anesthetic. Type: General.    History of anesthetic complications  - PONV.      ROS/MED HX  ENT/Pulmonary:     (+) tobacco use, Past use,     Neurologic:  - neg neurologic ROS     Cardiovascular:     (+) --CAD -past MI --CHF etiology: stress induced Last EF: 55 Previous cardiac testing   Echo: Date:  Results:  Technically difficult, suboptimal study. The left ventricle is normal in size.  There is normal left ventricular wall thickness. Left ventricular systolic  function is normal. The visual ejection fraction is estimated at 55-60%. Left  ventricular diastolic function is  normal. No regional wall motion  abnormalities noted.  The right ventricle is normal size. The right ventricular systolic function is  normal.  Trace mitral and tricuspid regurgitation.  No pericardial effusion.  Stress Test: Date: Results:    ECG Reviewed: Date: Results:    Cath: Date: Results:      METS/Exercise Tolerance:     Hematologic:       Musculoskeletal:   (+) arthritis,     GI/Hepatic:  - neg GI/hepatic ROS     Renal/Genitourinary:  - neg Renal ROS     Endo:  - neg endo ROS     Psychiatric/Substance Use:     (+) psychiatric history anxiety     Infectious Disease:  - neg infectious disease ROS     Malignancy:       Other:            Physical Exam    Airway        Mallampati: II   TM distance: > 3 FB   Neck ROM: full   Mouth opening: > 3 cm    Respiratory Devices and Support         Dental  no notable dental history         Cardiovascular   cardiovascular exam normal          Pulmonary   pulmonary exam normal                OUTSIDE LABS:  CBC:   Lab Results   Component Value Date    WBC 6.0 04/10/2009    HGB 13.9 03/16/2021    HGB 14.3 04/10/2009    HCT 41.7 04/10/2009     04/10/2009     BMP:   Lab Results   Component Value Date     08/05/2020     04/10/2009    POTASSIUM 4.3 08/05/2020    POTASSIUM 3.8 04/10/2009    CHLORIDE 105 08/05/2020    CHLORIDE 101 04/10/2009    CO2 28 08/05/2020    CO2 28 04/10/2009    BUN 20 08/05/2020    BUN 15 04/10/2009    CR 0.75 08/05/2020    CR 0.62 04/10/2009    GLC 91 08/05/2020     (H) 04/10/2009     COAGS: No results found for: PTT, INR, FIBR  POC:   Lab Results   Component Value Date     (H) 04/10/2009     HEPATIC:   Lab Results   Component Value Date    ALBUMIN 3.9 10/16/2020    PROTTOTAL 7.2 10/16/2020    ALT 42 10/16/2020    AST 32 10/16/2020    ALKPHOS 81 10/16/2020    BILITOTAL 0.6 10/16/2020     OTHER:   Lab Results   Component Value Date    GAVIN 9.4 08/05/2020    TSH 0.62 04/10/2009       Anesthesia Plan    ASA Status:  3   NPO  Status:  NPO Appropriate    Anesthesia Type: General.     - Airway: ETT   Induction: Intravenous.   Maintenance: Balanced.        Consents    Anesthesia Plan(s) and associated risks, benefits, and realistic alternatives discussed. Questions answered and patient/representative(s) expressed understanding.     - Discussed with:  Patient      - Extended Intubation/Ventilatory Support Discussed: No.      - Patient is DNR/DNI Status: No    Use of blood products discussed: No .     Postoperative Care    Pain management: IV analgesics, Oral pain medications, Multi-modal analgesia.   PONV prophylaxis: Ondansetron (or other 5HT-3), Dexamethasone or Solumedrol     Comments:                Davi Kuo MD

## 2021-03-16 NOTE — OP NOTE
"March 16, 2021    Pre Operative Diagnosis: Left hip DJD  Post Operative Diagnosis: Left hip DJD  Title of the Operation: Left Total hip arthroplasty ([employer] 52 mm acetabulum; #10 A fit [employer] high offset stem; -2 neck 36 mm head)  Anesthesia: general  Surgeon: Dante Becker MD  Assistant: BRE Davenport    Assistance from the PA was necessary for this case due to the complexity of the procedure. PA helped with satisfactory exposure of the bones, protecting vital neurovascular and ligamentous structures. He also helped with maintaining the instruments for bone osteotomy and subsequent implantation of the components. He also performed wound closure and placement of postoperative dressing.    Drain: none    Indication: This patient is a 75 y.o. female who has had chronic hip pain due to arthritis. Pain is persisting despite all reasonable and appropriate treatments. The pre-operative x-rays confirmed the clinical diagnosis of arthritis. Because of progressively worsening pain that is affecting his day-to-day life significantly, the operation of total hip replacement is chosen. A thorough discussion regarding options, potential complications including but not exclusive of infection, leg length inequality, agusto-prosthetic fracture, deep vein thrombosis, anesthetic risks including death, etc was carried out.    Description of the Operation:  After satisfactory anesthesia was administered, the patient was then turned to the lateral decubitus position exposing the correct hip. A routine sterile prepping and draping was performed.  The standard \"time out\" was then carried out per protocol. At this point, administration preoperative antibiotic was confirmed.  A standard posterior approach to the hip was carried out. The incision was made over the greater trochanter in a slightly curved fashion. Through a sharp skin dissection, the fascia over the gluteus asia and IT band were identified.With placement of the " east/west retractor, the short external rotator tendons were identified. The short external rotator tendons and the posterior capsule were then released from the proximal femur and the acetabulum respectively with care taken to protect the sciatic nerve. The hip joint is now exposed. The short external rotator tendons were tagged for future repair at the time of closure.  Subsequently, the femoral head was dislocated from the acetabulum. The findings at this point was felt to be consistent with advanced degenerative joint disease of the hip joint. Using the femoral neck cutting guide, the femoral neck osteotomy was then carried out according to the preoperative planning.  The acetabulum was then exposed with retraction of the femur anterior ly. With clear visualization, the acetabulum was then reamed in a sequential fashion until subchondral bleeding bone was visualized. The appropriate acetabular component was then impacted into an anatomic position which included 25-30 degrees of anteversion and 40-45 of abduction. The component was noted to be seated tightly to the point that no adjunctive screw fixation was felt to be necessary. At this point the acetabular liner was placed using the neutral liner.     At this point the attention was then paid to the femoral side. The intramedullary canal was identified by placing a canal finder. A sequential reaming of the intramedullary canal was then carried out until we felt that the reamer was tight within the canal. Next, a sequential broaching was carried out until the broach did not cause pistonNing or rotational movement. Next, the trial femoral neck and head components were placed on top of the stem. With the reduction of the hip joint,  leg length as well as range of motion were checked. Once we found optimal leg length allowing full flexion beyond 90 degrees with the knee adducted to the other knee, no dislocation with 30 degrees of internal rotation and full flexion  as well as 5 mm push/pull. Subsequently, we placed the actual femoral stem into the femoral canal.  The femoral head component was subsequently impacted onto the stem and the hip joint was once again reduced. With re-confirmation of satisfactory leg length and stability, the wound was irrigated copiously. The previously tagged external rotator tendons were then repaired using 2 drill holes made into the greater trochanter. The rest of the wound was closed in layers in a routine fashion along with final skin closure done with staples. Soft dressing was applied.     The needle and sponge counts were  correct. And there were no identified intraoperative complications .The blood loss from this operation is as listed on the anesthesia record.    Dante Becker M.D.

## 2021-03-16 NOTE — ANESTHESIA POSTPROCEDURE EVALUATION
Patient: Kandis Tse    Procedure(s):  Left total hip arthroplasty    Diagnosis:Primary localized osteoarthritis of left hip [M16.12]  Diagnosis Additional Information: No value filed.    Anesthesia Type:  General    Note:  Disposition: Admission   Postop Pain Control: Uneventful            Sign Out: Well controlled pain   PONV: No   Neuro/Psych: Uneventful            Sign Out: Acceptable/Baseline neuro status   Airway/Respiratory: Uneventful            Sign Out: Acceptable/Baseline resp. status   CV/Hemodynamics: Uneventful            Sign Out: Acceptable CV status   Other NRE: NONE   DID A NON-ROUTINE EVENT OCCUR? No         Last vitals:  Vitals:    03/16/21 1645 03/16/21 1700 03/16/21 1715   BP: (!) 140/83 129/79 107/75   Pulse: 107 115 113   Resp: 8 14 (!) 6   Temp:      SpO2: 100% 97% 96%       Last vitals prior to Anesthesia Care Transfer:  CRNA VITALS  3/16/2021 1537 - 3/16/2021 1637      3/16/2021             Pulse:  (!) 47    SpO2:  (!) 84 %          Electronically Signed By: Davi Kuo MD  March 16, 2021  5:25 PM

## 2021-03-16 NOTE — BRIEF OP NOTE
[facility]    Brief Operative Note    Pre-operative diagnosis: Primary localized osteoarthritis of left hip [M16.12]  Post-operative diagnosis left hip DJD    Procedure: Procedure(s):  Left total hip arthroplasty  Surgeon: Surgeon(s) and Role:     * Dante Becker MD - Primary     * Nolberto Donovan PA-C  Anesthesia: General   Estimated blood loss: Less than 50 ml  Drains: None  Specimens: * No specimens in log *  Findings:   None.  Complications: None.  Implants:   Implant Name Type Inv. Item Serial No.  Lot No. LRB No. Used Action   52MM COATED SHELL     [employer] [id number] Left 1 Implanted   ACETABULAR LINER 36MM    [employer] [id number] Left 1 Implanted   10H HIGH OFFSET FEMORAL COMPONENT     [employer] [id number] Left 1 Implanted   12/14 FEMORAL HEAD 36MM    [employer] [id number] Left 1 Implanted

## 2021-03-17 ENCOUNTER — APPOINTMENT (OUTPATIENT)
Dept: PHYSICAL THERAPY | Facility: CLINIC | Age: 76
End: 2021-03-17
Attending: ORTHOPAEDIC SURGERY
Payer: COMMERCIAL

## 2021-03-17 ENCOUNTER — APPOINTMENT (OUTPATIENT)
Dept: OCCUPATIONAL THERAPY | Facility: CLINIC | Age: 76
End: 2021-03-17
Attending: ORTHOPAEDIC SURGERY
Payer: COMMERCIAL

## 2021-03-17 LAB
GLUCOSE SERPL-MCNC: 116 MG/DL (ref 70–99)
HGB BLD-MCNC: 10.4 G/DL (ref 11.7–15.7)

## 2021-03-17 PROCEDURE — 36415 COLL VENOUS BLD VENIPUNCTURE: CPT | Performed by: PHYSICIAN ASSISTANT

## 2021-03-17 PROCEDURE — 250N000011 HC RX IP 250 OP 636: Performed by: PHYSICIAN ASSISTANT

## 2021-03-17 PROCEDURE — 97535 SELF CARE MNGMENT TRAINING: CPT | Mod: GO | Performed by: REHABILITATION PRACTITIONER

## 2021-03-17 PROCEDURE — 97161 PT EVAL LOW COMPLEX 20 MIN: CPT | Mod: GP | Performed by: PHYSICAL THERAPIST

## 2021-03-17 PROCEDURE — 85018 HEMOGLOBIN: CPT | Performed by: PHYSICIAN ASSISTANT

## 2021-03-17 PROCEDURE — 96372 THER/PROPH/DIAG INJ SC/IM: CPT | Performed by: PHYSICIAN ASSISTANT

## 2021-03-17 PROCEDURE — 97116 GAIT TRAINING THERAPY: CPT | Mod: GP | Performed by: PHYSICAL THERAPIST

## 2021-03-17 PROCEDURE — 258N000003 HC RX IP 258 OP 636: Performed by: PHYSICIAN ASSISTANT

## 2021-03-17 PROCEDURE — 82947 ASSAY GLUCOSE BLOOD QUANT: CPT | Performed by: PHYSICIAN ASSISTANT

## 2021-03-17 PROCEDURE — 250N000013 HC RX MED GY IP 250 OP 250 PS 637: Performed by: PHYSICIAN ASSISTANT

## 2021-03-17 PROCEDURE — 97530 THERAPEUTIC ACTIVITIES: CPT | Mod: GP | Performed by: PHYSICAL THERAPIST

## 2021-03-17 PROCEDURE — 97165 OT EVAL LOW COMPLEX 30 MIN: CPT | Mod: GO | Performed by: REHABILITATION PRACTITIONER

## 2021-03-17 RX ORDER — GABAPENTIN 100 MG/1
100 CAPSULE ORAL 3 TIMES DAILY
Status: DISCONTINUED | OUTPATIENT
Start: 2021-03-17 | End: 2021-03-18 | Stop reason: HOSPADM

## 2021-03-17 RX ORDER — ASPIRIN 81 MG/1
162 TABLET ORAL DAILY
Status: DISCONTINUED | OUTPATIENT
Start: 2021-03-18 | End: 2021-03-18 | Stop reason: HOSPADM

## 2021-03-17 RX ORDER — CELECOXIB 100 MG/1
100 CAPSULE ORAL 2 TIMES DAILY
Status: COMPLETED | OUTPATIENT
Start: 2021-03-17 | End: 2021-03-17

## 2021-03-17 RX ADMIN — CITALOPRAM HYDROBROMIDE 20 MG: 20 TABLET ORAL at 10:18

## 2021-03-17 RX ADMIN — ACETAMINOPHEN 975 MG: 325 TABLET, FILM COATED ORAL at 05:23

## 2021-03-17 RX ADMIN — POLYETHYLENE GLYCOL 3350 17 G: 17 POWDER, FOR SOLUTION ORAL at 10:17

## 2021-03-17 RX ADMIN — HYDROMORPHONE HYDROCHLORIDE 4 MG: 4 TABLET ORAL at 10:18

## 2021-03-17 RX ADMIN — HYDROMORPHONE HYDROCHLORIDE 4 MG: 4 TABLET ORAL at 04:49

## 2021-03-17 RX ADMIN — CEFAZOLIN 1 G: 1 INJECTION, POWDER, FOR SOLUTION INTRAMUSCULAR; INTRAVENOUS at 05:25

## 2021-03-17 RX ADMIN — ACETAMINOPHEN 975 MG: 325 TABLET, FILM COATED ORAL at 22:33

## 2021-03-17 RX ADMIN — DOCUSATE SODIUM 50 MG AND SENNOSIDES 8.6 MG 1 TABLET: 8.6; 5 TABLET, FILM COATED ORAL at 19:58

## 2021-03-17 RX ADMIN — ACETAMINOPHEN 975 MG: 325 TABLET, FILM COATED ORAL at 14:51

## 2021-03-17 RX ADMIN — HYDROMORPHONE HYDROCHLORIDE 2 MG: 2 TABLET ORAL at 00:47

## 2021-03-17 RX ADMIN — GABAPENTIN 100 MG: 100 CAPSULE ORAL at 19:58

## 2021-03-17 RX ADMIN — HYDROXYZINE HYDROCHLORIDE 10 MG: 10 TABLET, FILM COATED ORAL at 01:52

## 2021-03-17 RX ADMIN — ENOXAPARIN SODIUM 40 MG: 40 INJECTION SUBCUTANEOUS at 14:52

## 2021-03-17 RX ADMIN — DOCUSATE SODIUM 50 MG AND SENNOSIDES 8.6 MG 1 TABLET: 8.6; 5 TABLET, FILM COATED ORAL at 10:18

## 2021-03-17 RX ADMIN — HYDROMORPHONE HYDROCHLORIDE 4 MG: 4 TABLET ORAL at 14:54

## 2021-03-17 RX ADMIN — DOCUSATE SODIUM 100 MG: 100 CAPSULE, LIQUID FILLED ORAL at 10:18

## 2021-03-17 RX ADMIN — SODIUM CHLORIDE 500 ML: 9 INJECTION, SOLUTION INTRAVENOUS at 12:06

## 2021-03-17 RX ADMIN — CELECOXIB 100 MG: 100 CAPSULE ORAL at 19:58

## 2021-03-17 RX ADMIN — DOCUSATE SODIUM 100 MG: 100 CAPSULE, LIQUID FILLED ORAL at 19:58

## 2021-03-17 RX ADMIN — GABAPENTIN 100 MG: 100 CAPSULE ORAL at 16:39

## 2021-03-17 NOTE — PLAN OF CARE
PM session: Pt unable to tolerate more than 20 feet of ambulation this pm. Was not able to safely attempt stairs this pm. Pt continues to have lightheadness, BP not remarkable 123/63 down to 104/84. Continued pain, most concerning is her poor tracking with cues, needing many reinforcements with session for hip prec. Will continue to progress as able.

## 2021-03-17 NOTE — PLAN OF CARE
PM SHIFT  Pt arrived from pacu to room around 1830. Pt lethargic but easily arouseable. Pt educated on the new orders. Pt had a popsicle and some water. Pt stood at the side of the bed with assist of 2 and a walker and became light headed, nausea, and felt woozy. Pt also looked pale at that moment so she was assisted back to bed. Pt has not voided yet. Bladder scan multiple times and all were less than 100 ml. Pt had been NPO since midnight and had a later surgery. Staff encouraged fluids and patient has IVFS infusing at 75 ml an hour.     Patient vital signs are at baseline: No,  Reason:  pts heart rate was elevated at the beginning of the shift. Patient was given her home dose of coreg but HR was still elevated one hour after it so this staff paged and talked to the PA around 2000. [facility] ONEAL ordered tele and if her heart rate did not decrease by 2100 than they wanted me to place a hospitalist consult. HR came down and tele never called with any changes when pt stood at the side of the bed.  Patient able to ambulate as they were prior to admission or with assist devices provided by therapies during their stay:  No,  Reason:  pt did not tolerate standing at the side of the bed and got dizzy. Pt yet to see PT.  Patient MUST void prior to discharge:  Yes pt has yet to void  Patient able to tolerate oral intake:  Yes but only taking liquids  Pain has adequate pain control using Oral analgesics:  Yes but has not ambulated yet to see if pain relief enough for that. Pt only had scheduled tylenol.

## 2021-03-17 NOTE — PROGRESS NOTES
[facility]  Orthopedics Progress Note             Interval History:     75 year old female admitted postoperatively for elective Left Total hip arthroplasty DOS 03/16/2021with Dr. Dante Becker due to DJD unresponsive to non operative treatment.  There were no intraoperative complications.  Patient currently Post op day #1.    Patient reports very unsure of progress.  Legs shaking when standing, some lightheaded sensations.    Nursing reports Hypotension, (observed 93/57 in bed at visit), with PT reporting orthostatic below that when ambulating.  Some light headed.  Poor urinary output, pain not well controlled without higher dose of Dilaudid.    Pt also with acute blood loss, hgb 10.4 down from 13.9, and tachycardia overnight.    Pain: minimally controlled  Nausea: non3.  Light headedness : none  Chest pain: none  Shortness of breath: none              Assessment and Plan:     S/P Left ELBA, day #1.  Hypotension/orthostatic  Acute blood loss anemia  Period of tachycardia.  Urinary output minimal.    PLAN:  Hold coreg for hypotension, look at other option if tachy again.  Hold NSAIDs, rechech Creatinine if low urinary output continues, small bolus.  Encourage fluids  Hip restrictions.  Hold until more stable.             /  /     Physical Exam:     Patient Vitals for the past 12 hrs:   BP Temp Temp src Pulse Resp SpO2   03/17/21 1018 93/57 -- -- 89 -- --   03/17/21 0737 119/66 98.5  F (36.9  C) Temporal 80 13 96 %   03/17/21 0410 107/60 97.8  F (36.6  C) Temporal 86 11 98 %   03/17/21 0154 -- -- -- -- 15 --   03/17/21 0035 100/66 98.2  F (36.8  C) Temporal 98 13 97 %     Hemoglobin   Date Value Ref Range Status   03/17/2021 10.4 (L) 11.7 - 15.7 g/dL Final   03/16/2021 13.9 11.7 - 15.7 g/dL Final       Patient is alert and oriented.  Neurovascular status: Grossly intact to LLE.  Dressing: clean and dry  Calves: soft and non tender          Nolberto Donovan PA-C  [facility] -  Surgery    3/17/2021

## 2021-03-17 NOTE — PROGRESS NOTES
03/17/21 1009   Quick Adds   Quick Adds Certification   Type of Visit Initial PT Evaluation   Living Environment   People in home child(alan), adult;spouse   Current Living Arrangements house   Home Accessibility stairs to enter home;stairs within home   Number of Stairs, Main Entrance 5   Stair Railings, Main Entrance railings safe and in good condition   Number of Stairs, Within Home, Primary 7   Stair Railings, Within Home, Primary railings safe and in good condition   Living Environment Comments Pt has special needs dtg and pt's  has had a previous stroke.  Both do not need physical assist but have more cognitive deficits. Pt reports son is able to assist with meals/IADLs. Pt dtg easily cued and pt believes she could assist with bed mob/transfers ADLs PRN. Also pt reports friend is able to help with driving and initial set up at home.    Self-Care   Equipment Currently Used at Home walker, standard;shower chair  (hand held shower)   General Information   Onset of Illness/Injury or Date of Surgery 03/16/21   Referring Physician Dr. Becker   Patient/Family Therapy Goals Statement (PT) Pt hesistant about DC home today.    Pertinent History of Current Problem (include personal factors and/or comorbidities that impact the POC) Pt is status post L ELBA   Existing Precautions/Restrictions 90 degree hip flexion;no pivoting or twisting;no hip ADD past midline   Weight-Bearing Status - LLE weight-bearing as tolerated   Cognition   Cognitive Status Comments Pt mild trouble word finding and also endorses trouble navigating phone, difficulty remembering hip prec.    Pain Assessment   Patient Currently in Pain Yes, see Vital Sign flowsheet   Posture    Posture Protracted shoulders   Range of Motion (ROM)   ROM Comment limited due to pain, ~60 degrees in supine for hip flexion   Strength   Strength Comments Limited quad control in standing likely due to pain, good quad set in supine   Bed Mobility   Comment (Bed Mobility)  min/mod A for LE and HOB elevated, painful, slow, needing to trial L then eventually to R side. poor tolerance to long sitting.    Transfers   Transfer Safety Comments min A due to poor quad control, FWW   Gait/Stairs (Locomotion)   Comment (Gait/Stairs) Pt able to perform 5 feet of ambulation with manual blocking on L knee 25% of time. step to pattern, slow, painful,. Pt was lightheaded with this activity -110/50s- mild orthostatic. Unable to progress to up in chair at end of session.    Balance   Balance Comments Heavy need for B UE support   Sensory Examination   Sensory Perception patient reports no sensory changes   Clinical Impression   Criteria for Skilled Therapeutic Intervention yes, treatment indicated   PT Diagnosis (PT) decreased functional mobility   Influenced by the following impairments pain, decreased strength, decreased ROM, poor CV response to activity   Functional limitations due to impairments decreased bed mob, transfers, ambulation, stairs   Clinical Presentation Stable/Uncomplicated   Clinical Presentation Rationale Progressing as expected   Clinical Decision Making (Complexity) low complexity   Therapy Frequency (PT) 2x/day   Predicted Duration of Therapy Intervention (days/wks) 2 days   Planned Therapy Interventions (PT) bed mobility training;gait training;patient/family education;home exercise program;strengthening;transfer training;stair training   Anticipated Equipment Needs at Discharge (PT) cane, straight   Risk & Benefits of therapy have been explained evaluation/treatment results reviewed;care plan/treatment goals reviewed;risks/benefits reviewed;current/potential barriers reviewed;participants voiced agreement with care plan;participants included;patient   PT Discharge Planning    PT Discharge Recommendation (DC Rec)   (defer to ortho)   PT Rationale for DC Rec Pt will likely need min A bed mobility, CGA for sit<>stand, min A for stairs. SBA with ambulation   PT Brief overview  of current status  Limited session due to lightheadness, pain, anxiety. Ambulating 5 feet x 2    Therapy Certification   Start of care date 03/17/21   Certification date from 03/17/21   Certification date to 03/19/21   Total Evaluation Time   Total Evaluation Time (Minutes) 8

## 2021-03-17 NOTE — PROGRESS NOTES
"PRIMARY DIAGNOSIS: \"GENERIC\" NURSING  OUTPATIENT/OBSERVATION GOALS TO BE MET BEFORE DISCHARGE:  1. ADLs back to baseline: No    2. Activity and level of assistance: assist x 1     3. Pain status: Improved-controlled with oral pain medications.    4. Return to near baseline physical activity: No     Discharge Planner Nurse   Safe discharge environment identified: No  Barriers to discharge: Yes       Entered by: Nae Maynard 03/17/2021 6:06 PM   patient light headed and dizzy, very unsteady when getting to the commode. Needs constant reminders with safe transfer. Only up to BSC. Ice to hip. Very loopy from oral Dilaudid, very forgetful. B/p low. Held coreg. Started Gabapentin. Ice to left hip. Dressing CD&I. Good appetite and taking in good po fluids. Retaining urine.  cc  Please review provider order for any additional goals.   Nurse to notify provider when observation goals have been met and patient is ready for discharge.  "

## 2021-03-17 NOTE — PLAN OF CARE
[unfilled]                                                                              [facility]      OUTPATIENT PHYSICAL THERAPY EVALUATION  PLAN OF TREATMENT FOR OUTPATIENT REHABILITATION  (COMPLETE FOR INITIAL CLAIMS ONLY)  Patient's Last Name, First Name, M.I.  YOB: 1945  Kandis Tse                        Provider's Name  [facility] Medical Record No.  [id number]                               Onset Date:  03/16/21   Start of Care Date:  03/17/21      Type:     _X_PT   ___OT   ___SLP Medical Diagnosis:                           PT Diagnosis:  decreased functional mobility   Visits from SOC:  1   _________________________________________________________________________________  Plan of Treatment/Functional Goals    Planned Interventions: bed mobility training, gait training, patient/family education, home exercise program, strengthening, transfer training, stair training     Goals: See Physical Therapy Goals on Care Plan in [employer] electronic health record.    Therapy Frequency: 2x/day  Predicted Duration of Therapy Intervention: 2 days  _________________________________________________________________________________    I CERTIFY THE NEED FOR THESE SERVICES FURNISHED UNDER        THIS PLAN OF TREATMENT AND WHILE UNDER MY CARE     (Physician co-signature of this document indicates review and certification of the therapy plan).              Certification date from: 03/17/21, Certification date to: 03/19/21    Referring Physician: Dr. Becker            Initial Assessment        See Physical Therapy evaluation dated 03/17/21 in Epic electronic health record.

## 2021-03-17 NOTE — PLAN OF CARE
Patient vital signs are at baseline: Yes  Patient able to ambulate as they were prior to admission or with assist devices provided by therapies during their stay:  No,  Reason:  Unsteady light headed up with assist of 2 to BSC  Patient MUST void prior to discharge:  No,  Reason:  Unable to void bladder scan 286  Patient able to tolerate oral intake:  Yes  Pain has adequate pain control using Oral analgesics:  Yes Complained of pain 6/10 after taking 2mg oral dilaudid.  $mg given with better pain control

## 2021-03-17 NOTE — PROGRESS NOTES
SPIRITUAL HEALTH SERVICES Progress Note  FR Ortho 6    Spiritual Health visit per consult order for assistance with healthcare directive.  Attempted x3 today. Pt involved in therapies and cares.    Plan: Will attempt again when pt has availability.    Matthew Orr MA  Staff   Pager: [phone]  Phone: [phone]

## 2021-03-17 NOTE — PROGRESS NOTES
03/17/21 1105   Quick Adds   Type of Visit Initial Occupational Therapy Evaluation   Living Environment   People in home child(alan), adult;spouse   Current Living Arrangements house   Home Accessibility stairs to enter home;stairs within home   Number of Stairs, Main Entrance 5   Number of Stairs, Within Home, Primary 7   Living Environment Comments Pt has special needs dtg and pt's  has had a previous stroke.  Both do not need physical assist but have more cognitive deficits. Pt reports son is able to assist with meals/IADLs. Pt dtg easily cued and pt believes she could assist with bed mob/transfers ADLs PRN. Also pt reports friend is able to help with driving and initial set up at home.    Self-Care   Equipment Currently Used at Home shower chair;walker, standard  (hand held shower)   Disability/Function   Change in Functional Status Since Onset of Current Illness/Injury yes   General Information   Onset of Illness/Injury or Date of Surgery 03/16/21   Referring Physician Nate Donovan PA-C   Patient/Family Therapy Goal Statement (OT) to return home   Additional Occupational Profile Info/Pertinent History of Current Problem Patient is POD #1 L ELBA   Existing Precautions/Restrictions no hip IR;no hip ADD past midline;90 degree hip flexion   Left Lower Extremity (Weight-bearing Status) weight-bearing as tolerated (WBAT)   Cognitive Status Examination   Orientation Status orientation to person, place and time   Visual Perception   Visual Impairment/Limitations corrective lenses full-time   Pain Assessment   Patient Currently in Pain Yes, see Vital Sign flowsheet  (rating not provided)   Range of Motion Comprehensive   General Range of Motion no range of motion deficits identified   Strength Comprehensive (MMT)   General Manual Muscle Testing (MMT) Assessment no strength deficits identified   Muscle Tone Assessment   Muscle Tone Quick Adds No deficits were identified   Coordination   Upper Extremity  Coordination No deficits were identified   Bed Mobility   Comment (Bed Mobility) SBA for bed mobility   Transfers   Transfer Comments not completed   Activities of Daily Living   BADL Assessment/Intervention lower body dressing   Lower Body Dressing Assessment/Training   Comment (Lower Body Dressing) defer to OT daily note for details   Instrumental Activities of Daily Living (IADL)   IADL Comments family can A minimally, patient reports her dtr is able to follow 1step simple directions.    Clinical Impression   Criteria for Skilled Therapeutic Interventions Met (OT) yes;meets criteria;skilled treatment is necessary   OT Diagnosis decreased ADL/IADLS   OT Problem List-Impairments impacting ADL activity tolerance impaired;balance;strength;range of motion (ROM);post-surgical precautions;pain   Assessment of Occupational Performance 5 or more Performance Deficits   Identified Performance Deficits dsg, toileting, bathing, functional/community mobility, household chores, driving, errands   Planned Therapy Interventions (OT) ADL retraining;progressive activity/exercise;transfer training   Clinical Decision Making Complexity (OT) low complexity   Therapy Frequency (OT) Daily   Predicted Duration of Therapy 2 days   Anticipated Equipment Needs Upon Discharge (OT) hip kit;bathing equipment   Risk & Benefits of therapy have been explained evaluation/treatment results reviewed;care plan/treatment goals reviewed;risks/benefits reviewed;participants voiced agreement with care plan;patient   OT Discharge Planning    OT Rationale for DC Rec defer to ortho for dc plan- patient is working towards meeting goals for safe return home   Total Evaluation Time (Minutes)   Total Evaluation Time (Minutes) 10

## 2021-03-18 ENCOUNTER — APPOINTMENT (OUTPATIENT)
Dept: PHYSICAL THERAPY | Facility: CLINIC | Age: 76
End: 2021-03-18
Attending: ORTHOPAEDIC SURGERY
Payer: COMMERCIAL

## 2021-03-18 ENCOUNTER — APPOINTMENT (OUTPATIENT)
Dept: OCCUPATIONAL THERAPY | Facility: CLINIC | Age: 76
End: 2021-03-18
Attending: ORTHOPAEDIC SURGERY
Payer: COMMERCIAL

## 2021-03-18 VITALS
DIASTOLIC BLOOD PRESSURE: 55 MMHG | TEMPERATURE: 98 F | HEART RATE: 78 BPM | RESPIRATION RATE: 16 BRPM | HEIGHT: 67 IN | OXYGEN SATURATION: 93 % | SYSTOLIC BLOOD PRESSURE: 100 MMHG | WEIGHT: 154 LBS | BODY MASS INDEX: 24.17 KG/M2

## 2021-03-18 LAB — HGB BLD-MCNC: 8.8 G/DL (ref 11.7–15.7)

## 2021-03-18 PROCEDURE — 97535 SELF CARE MNGMENT TRAINING: CPT | Mod: GO,59

## 2021-03-18 PROCEDURE — 97530 THERAPEUTIC ACTIVITIES: CPT | Mod: GP | Performed by: PHYSICAL THERAPIST

## 2021-03-18 PROCEDURE — 250N000013 HC RX MED GY IP 250 OP 250 PS 637: Performed by: PHYSICIAN ASSISTANT

## 2021-03-18 PROCEDURE — 97116 GAIT TRAINING THERAPY: CPT | Mod: GP | Performed by: PHYSICAL THERAPIST

## 2021-03-18 PROCEDURE — 85018 HEMOGLOBIN: CPT | Performed by: PHYSICIAN ASSISTANT

## 2021-03-18 PROCEDURE — 36415 COLL VENOUS BLD VENIPUNCTURE: CPT | Performed by: PHYSICIAN ASSISTANT

## 2021-03-18 RX ORDER — HYDROMORPHONE HYDROCHLORIDE 2 MG/1
1-2 TABLET ORAL EVERY 4 HOURS PRN
Qty: 15 TABLET | Refills: 0 | Status: SHIPPED | OUTPATIENT
Start: 2021-03-18 | End: 2022-01-03

## 2021-03-18 RX ORDER — ACETAMINOPHEN 325 MG/1
650 TABLET ORAL EVERY 4 HOURS PRN
Qty: 100 TABLET | Refills: 0 | Status: SHIPPED | OUTPATIENT
Start: 2021-03-19 | End: 2022-01-03

## 2021-03-18 RX ORDER — AMOXICILLIN 250 MG
1 CAPSULE ORAL 2 TIMES DAILY PRN
Qty: 20 TABLET | Refills: 0 | Status: SHIPPED | OUTPATIENT
Start: 2021-03-18 | End: 2022-01-03

## 2021-03-18 RX ADMIN — DOCUSATE SODIUM 100 MG: 100 CAPSULE, LIQUID FILLED ORAL at 08:20

## 2021-03-18 RX ADMIN — ASPIRIN 162 MG: 81 TABLET ORAL at 08:21

## 2021-03-18 RX ADMIN — DOCUSATE SODIUM 50 MG AND SENNOSIDES 8.6 MG 1 TABLET: 8.6; 5 TABLET, FILM COATED ORAL at 08:20

## 2021-03-18 RX ADMIN — GABAPENTIN 100 MG: 100 CAPSULE ORAL at 08:20

## 2021-03-18 RX ADMIN — ACETAMINOPHEN 975 MG: 325 TABLET, FILM COATED ORAL at 05:51

## 2021-03-18 RX ADMIN — ACETAMINOPHEN 975 MG: 325 TABLET, FILM COATED ORAL at 13:51

## 2021-03-18 RX ADMIN — GABAPENTIN 100 MG: 100 CAPSULE ORAL at 13:51

## 2021-03-18 RX ADMIN — CARVEDILOL 6.25 MG: 6.25 TABLET, FILM COATED ORAL at 08:20

## 2021-03-18 RX ADMIN — CITALOPRAM HYDROBROMIDE 20 MG: 20 TABLET ORAL at 08:20

## 2021-03-18 NOTE — PLAN OF CARE
Occupational Therapy Discharge Summary    Reason for therapy discharge:    Discharged to home with home therapy.    Progress towards therapy goal(s). See goals on Care Plan in [facility] electronic health record for goal details.  Goals partially met.  Barriers to achieving goals:   discharge from facility.    Therapy recommendation(s):    Defer to ortho. Anticipate pt will require Adilene for LB dressing, toileting, bathing, bed mobility; assist with all IADLs; and 24 hr supervision. Pt reports desire to have home therapy

## 2021-03-18 NOTE — PROGRESS NOTES
SPIRITUAL HEALTH SERVICES Progress Note  FR Ortho 6    Spiritual Health Services consult order for assistance with healthcare directive.  Provided informational resources and education related to directive.  Pt would like to review documents with healthcare agent and will request follow up as needed.    Plan: [facility] is available for additional consultation/assistance with healthcare directive.    Matthew Orr MA  Staff   Pager: [phone]  Phone: [phone]

## 2021-03-18 NOTE — CONSULTS
.  Care Management Initial Consult    General Information  Assessment completed with: Patient,    Type of CM/SW Visit: Initial Assessment      Reason for Consult: discharge planning, per discussion with MD, pt will discharge home with home RN, PT/OT servoices         Communication Assessment  Patient's communication style: spoken language (English or Bilingual)    Hearing Difficulty or Deaf: no   Wear Glasses or Blind: yes    Cognitive  Cognitive/Neuro/Behavioral: WDL                      Living Environment:   People in home: child(alan), adult     Current living Arrangements: house      Able to return to prior arrangements: yes       Family/Social Support:  Care provided by: self  Provides care for: child(alan)(adult daughter has a developmental disability)  Marital Status:   , Children(son, Rei)          Description of Support System: Supportive, Involved         Current Resources:   Patient receiving home care services: No     Community Resources: None  Equipment currently used at home: shower chair, walker, standard(hand held shower)  Supplies currently used at home:      Employment/Financial:  Employment Status: retired        Financial Concerns: No concerns identified           Lifestyle & Psychosocial Needs:        Socioeconomic History     Marital status:      Spouse name: Not on file     Number of children: Not on file     Years of education: Not on file     Highest education level: Not on file     Tobacco Use     Smoking status: Former Smoker     Smokeless tobacco: Never Used   Substance and Sexual Activity     Alcohol use: No     Drug use: No     Sexual activity: Yes     Partners: Male     Values/Beliefs:  Spiritual, Cultural Beliefs, Islam Practices, Values that affect care: no               Additional Information:   Met with pt who affirms plan for her discharge home, discussed the home care services ordered which she is acceptant of. Pt notes that her in addition to her   at home, they have an adult daughter who has dx of developmental disability but is independent with ambulation and ADLs, aware of her Mom's surgery and per pt she will be able to help out at home. Pt notes that her son, Rei lives in [place], works from home so if needed she could go stay with him if it is found that she is not managing as well at home as she is currently anticipating.     Pt was offered a home care listing, based on discussion pt opted for [facility].. referral made. Per discussion with pt the agency would be able to initiate  services on Sunday which were acceptable to her, SW also  discussed this with ortho PA regarding start of care which is acceptable. SW had contacted multiple other agencies in attempt to get start of care earlier, service agencies not accepting new referrals, also would need to have agency services both [place] ( pt's residence) as well as [place] in case pt would go to stay with son, [facility] able to accommodate this.      Pt identifies no other discharge needs at this time.     Anticipate no problem with arrangements made for home care services as noted, SW available until discharge as needed.         .    Iliana Kuo, hospitals   Inpatient Care Coordination   [facility]     [phone]

## 2021-03-18 NOTE — DISCHARGE INSTRUCTIONS
. Home care services will be provided by [facility], contact number is [phone]. An agency representative will be contacting you to arrange the home care visit, anticipate Sunday visit.         POST OP TOTAL HIP INSTRUCTIONS:    Incision care:  To close the incision, staples will be used in most cases. The staples will be removed at the office in 10-14 days from the surgery. If you are going to a TCU (nursing home) from the hospital, the staples can be removed at the nursing home in that time frame. For first 3 days, place a water proof cover over the dressing for showers*. If the dressings get wet, change it with new gauze and paper tape. The incision can be wet after 4 days from the surgery.  You can take a shower but do not soak the incision. The incision should be covered with a light gauze that is held by 2 inch paper tape until follow up.     *If you have an aquacell dressing, (flesh colored rubber like bandage), you may leave this dressing in place until follow up in the clinic, unless; the dressing becomes completely saturated with blood, is leaking, gets water inside as evident by moisture appearing on the inside of the dressing, or you have a skin reaction.  In this case you should remove and use dressings like what is mentioned above.    Soft tissue: It is not unusual to have swelling in the leg (thigh down to the foot) up to 2 months from the surgery. Frequent ankle pumping, elevation of the leg above the heart level and gentle compression support with ace wrap should help with swelling. Icing regularly, for 20 minutes every hour, will also help with swelling and pain.  Due to soft tissue swelling, increased amount of redness around the incision site is also commonly seen. Progressively worsening redness along with increasing pain or increasing drainage from the wound should be a reason to alert us immediately.  You may also experience bruising.  This may occur anywhere from your toes, through  "the leg and even onto your torso.  It may show up even 1 week after surgery.      Medications:   Pain medication:  You will be discharged from the hospital with prescription opioid pain medication(s); Dilaudid(or hydromorphone)- taking 1/2 to 1 tablet every 4-6 hours IF NEEDED. In most cases, consistent use of this type of pain medication can be limited to a few days. After this period, the medication should be weaned off by decreasing the dose and/or increasing time between doses, as soon as possible to avoid potential complications of constipation, nausea, or rash, etc.   As you taper off the pain pills, you may find using them only at nighttime and then controlling the pain with over-the-counter medication(s) such as Ibuprofen OR Naproxen sodium (but not bother) and Tylenol during the day, would be a good way of managing the pain.  NOTE:  if you were prescribed Celebrex/Celecoxhib, Do NOT start Ibuprofen (advil/Motrin) or Naproxen (Naprosyn/Alleve) until done taking it.    In order to minimize the potential problem of blood clot:  If you were on Coumadin or other anticoagulation therapies before the surgery, you will resume these after the surgery.  If you were not on an anticoagulant/blood thinner prior to surgery, you will be asked to do one of the following.  1.Take aspirin (low risk patients) 162 mg (2 \"baby\" aspirin) every day.    In order to prevent constipation:  You may also be sent home with stool softener,  (senna marie - 1 tablet morning and night until bowel movements are regular).  It is recommended you take this until your bowel movements become normal for you.  You may also need to add a laxative to the routine if you have not had a bowel movement for several days following surgery.  Also, Increase water and fiber intake while on pain medication to help prevent constipation.       Other medications prior to surgery:  consult the medication list in your discharge instructions for any " "changes.    LIST:  Changes:  Tylenol (or acetaminophen) 650 mg (2 tablets) every 4 to 6 hours the first week minimum for pain.  Dilaudid(or hydromorphone)- taking 1/2 to 1 tablet every 4 to 6 hours IF NEEDED.  You may only need this at night.  Aspirin 162 mg (or 2 tables of the 82mg \"baby\" aspirin) daily to prevent blood clotting.  Senna-Marty 1 tablet morning and night for constipation, until normal bowel movements resume.    Resume:  Naproxen 250 mg 2 times daily if needed for pain.    Activities:  One of the main problems related to the hip replacement is that there is 1-3% chance for dislocation. This means the ball comes out of the socket.  This is due to the fact that the size of the femoral head is going to be smaller than the native femoral head and also due to the fact that the hip capsule which supports the joint is disrupted and repaired to perform the surgery.  As a result, there is a lack of a protecting barrier until it is re-established with healing of the capsule, which takes a minimum of 4 months.  For this reason, you are asked to avoid bending your hip greater than 90 degrees of flexion for the first 4 months. This also means avoiding sitting on a very plush chair/couch and not crossing the legs.    As long as there is no fracture complication, you can put all the weight on the operated leg. Walker/crutches can be discontinued according to your own progress. The general guideline is to discontinue the walker/crutches when you feel fairly comfortable and confident with your balance. You can return to walking, swimming, golfing, double tennis but jogging or running are not recommended.     You can lie on either side of your body as soon as you feel comfortable putting pressure on the incision. We recommend putting one or 2 pillows in between the legs when you lie on that side.     Please do not undergo procedures such as dental cleaning, oral surgery, colonoscopy as well as any type of surgery that " involves a significant incision for 4 months after surgery.  In case of an emergency, you will need an antibiotic, usually amoxicillin or clindamycin is used for this purpose.  You'll be taking the medication one hour before the procedure.  Please let the provider know that you have artificial implants in your body    Also, for the rest of your life, some providers recommend you take an antibiotic for procedures such as dental cleaning, oral surgery, colonoscopy as well as any type of surgery that involves a significant incision.  Please check with the provider performing the procedure and notify them of your implant.    Follow up in clinic within 14 days after surgery.  May call [phone] to schedule.    Nolberto Donovan PA-C  [facility] - Surgery  [facility]  [address] [place]  [address]  [phone]

## 2021-03-18 NOTE — DISCHARGE SUMMARY
"[facility]  Discharge Summary            Interval History:     75 year old female admitted postoperatively for elective Left Total hip arthroplasty DOS 03/2016, 2021with Dr. Dante Becker due to DJD unresponsive to non operative treatment.  There were no notable intraoperative complications.  Patient being discharged post op Day #2.  Kandis Tse received skilled nursing, PT, OT, SW inquiry.  There was no Hospitalist care during the stay.     Kandis Tse has progressed satisfactorily with ambulation and pain management and without medical complication.  Patient is confident in their discharge and has partially met goals with PT and OT. Pt lives at home with  and daugther with son and daughter in law available and will be discharged to home with home care services based on home living conditions and level of support.  Kandis Tse leaves the hospital in stable condition with good chance for recovery.  Today: Doing much better.  Pain controlled, eating, voiding, ambulating with help, did manage to repeat hip restrictions verbally, no new concerns.  No episodes of tachycardia, orthostatic or hypotension today as we resumed coreg, fluid bolus and time from anesthesia and ability to cope with acute blood loss.  Spoke with son who states she is very detail oriented at home, and sounded slightly \"loopy\" on the phone which is not normal.  Removal of Scopolamine patch and reduction of opioids seem to have helped this today.    Pain: tolerable.  Nausea: none.  Light headedness : none  Chest pain: none  Shortness of breath: none              Assessment and Plan:     S/P Left ELBA Day #2.  Final Diagnosis: Same.  VSS, Hemodynamically asymptomatic, pain management adequate.    PLAN:  DVT prophylaxis with 162mg aspirin daily, as patient has no history of VTE.  Pain management with tylenol, dilaudid 1-2 mg if needed.  May resume naproxen.  Bowel regimen.  Hip restrictions  Home care " services.  Patient instructions attached to discharge.      Discharge to home.  Follow up in clinic as previously scheduled, approx 10-14 days post op.    [facility]  [address] [place]  [address]  [phone] fax  [phone] for scheduling                      Physical Exam:     Patient Vitals for the past 12 hrs:   BP Temp Temp src Pulse Resp SpO2   03/18/21 1045 100/55 -- -- -- -- --   03/18/21 1037 (P) 100/55 -- -- -- -- --   03/18/21 0718 95/53 98  F (36.7  C) Temporal 78 16 93 %   03/18/21 0551 -- -- -- -- 16 --   03/18/21 0459 -- -- -- -- 16 --   03/17/21 2333 132/65 98.8  F (37.1  C) Temporal 99 16 93 %     Hemoglobin   Date Value Ref Range Status   03/18/2021 8.8 (L) 11.7 - 15.7 g/dL Final   03/17/2021 10.4 (L) 11.7 - 15.7 g/dL Final       Patient is alert and oriented.  Vitals: stable  Neurovascular status: Grossly intact to LLE.  Dressing: clean and dry  Calves: soft and non tender          Nolberto Donovan PA-C  [facility] Surgery    3/18/2021

## 2021-03-18 NOTE — PLAN OF CARE
Patient vital signs are at baseline: Yes  Patient able to ambulate as they were prior to admission or with assist devices provided by therapies during their stay:  Yes  Patient MUST void prior to discharge:  Yes  Patient able to tolerate oral intake:  Yes  Pain has adequate pain control using Oral analgesics:  Yes    Assist of 2 with a walker and gait belt to bedside commode. Walked a short distance in room.Unstable gait. Denied dizziness. Pain managed with Tylenol, rating pain 2/10 at rest and 3/10 when up. Sometimes forgetful and has set off bed alarm when needing to void. Pt encouraged to call for help when getting up. Voiding.

## 2021-03-18 NOTE — PLAN OF CARE
Physical Therapy Discharge Summary    Reason for therapy discharge:    Discharged to home with home therapy.    Progress towards therapy goal(s). [facility] for goal details.  Goals partially met.  Barriers to achieving goals:   discharge from facility.  Pt continues to require safety cues to follow precautions.     Therapy recommendation(s):    HHPT per ortho.

## 2021-03-19 ENCOUNTER — TELEPHONE (OUTPATIENT)
Dept: ORTHOPEDICS | Facility: CLINIC | Age: 76
End: 2021-03-19

## 2021-03-19 ENCOUNTER — TELEPHONE (OUTPATIENT)
Dept: CARE COORDINATION | Facility: CLINIC | Age: 76
End: 2021-03-19

## 2021-03-19 NOTE — TELEPHONE ENCOUNTER
Spoke with patient.  Doing well, no questions at this time.  Offered number for nurse triage and confirmed follow up appointment.    Nolberto Donovan PA-C  [facility] - Surgery

## 2021-03-19 NOTE — TELEPHONE ENCOUNTER
Emiliana Becker and Nate Donovan,  We are sending you this message as FYI, you don't need to do anything except to respond that you have receive it.  We will be seeing Kandis Tse for homecare admission visit on Saturday, 3\20, by the PT instead of on Rikki 3\21 by the RN.  PT start of care followed by SN evaluation, instead of the other way around  Thank you so much for this referral.  The PT will be sending you and update after they see her on Saturday.    Sincerely,  Loretta Goff RN, BSN  [facility]

## 2021-03-20 ENCOUNTER — MEDICAL CORRESPONDENCE (OUTPATIENT)
Dept: HEALTH INFORMATION MANAGEMENT | Facility: CLINIC | Age: 76
End: 2021-03-20

## 2021-03-22 ENCOUNTER — TELEPHONE (OUTPATIENT)
Dept: ORTHOPEDICS | Facility: CLINIC | Age: 76
End: 2021-03-22

## 2021-03-22 NOTE — TELEPHONE ENCOUNTER
Received voicemail from [facility] physical therapist. She requests verbal orders for skilled nursing evaluation for pain and medication management, occupational therapy evaluation for ADLS, physical therapy at home 3x/wk x 1 wk, 2x/wk x 1 wk, 1x/wk x 1wk for exercise, gait and transfers.     Patient is also taking 2 tabs of 81 mg Aspirin once daily as well as Naproxen 250mg up to 2 tabs daily for pain.   Her computer considers that a duplicate. She asks if it is ok that patient is on both medications.     She can be reached at: [phone].  Ok to leave message.       STUART Urrutia RN

## 2021-03-22 NOTE — TELEPHONE ENCOUNTER
Ok per Nate Donovan PA-C for verbal order requested below.   Phone call to Brenda and left message giving verbal orders for request below and ok to take both aspirin and Naproxen.     STUART Urrutia RN

## 2021-03-23 ENCOUNTER — TELEPHONE (OUTPATIENT)
Dept: ORTHOPEDICS | Facility: CLINIC | Age: 76
End: 2021-03-23

## 2021-03-23 NOTE — TELEPHONE ENCOUNTER
Received call from Lyn Craig, occupational therapist, [facility]. She just did occupational therapy evaluation for patient.   She asks for verbal orders to continue occupational therapy 1x wk x 1, 2/wk x 1, 1x wk x 1 week for modified techniques, home safety with ADLS in the home.   She can be reached at: [phone].  Ok to leave message.     Phone call to above number and the mail box is full. Called back and Lyn answered this time. Provided verbal orders for requested occupational therapy orders above.     STUART Urrutia RN

## 2021-03-29 ENCOUNTER — OFFICE VISIT (OUTPATIENT)
Dept: ORTHOPEDICS | Facility: CLINIC | Age: 76
End: 2021-03-29
Payer: COMMERCIAL

## 2021-03-29 VITALS
HEIGHT: 67 IN | WEIGHT: 154 LBS | DIASTOLIC BLOOD PRESSURE: 70 MMHG | BODY MASS INDEX: 24.17 KG/M2 | SYSTOLIC BLOOD PRESSURE: 122 MMHG

## 2021-03-29 DIAGNOSIS — Z09 POSTOP CHECK: Primary | ICD-10-CM

## 2021-03-29 PROCEDURE — 99024 POSTOP FOLLOW-UP VISIT: CPT | Performed by: ORTHOPAEDIC SURGERY

## 2021-03-29 ASSESSMENT — MIFFLIN-ST. JEOR: SCORE: 1226.17

## 2021-03-29 NOTE — PROGRESS NOTES
Subjective:  Kandis Tse is a 75 year old female who is seen in f/u up for left total hip arthoplasty, DOS 3/16/21.      Objective:   Left hip incision is healing well  No evidence of drainage or infection  Minimal to mild ecchymosis around incision  No peripheral swelling      Imaging studies:   None today        Assessment:   Doing well from left total hip arthroplasty        Plan:   Gradually increase her walking.  Continue to exercise dislocation precaution  Okay to discontinue home health therapy  Follow-up with Nate Donovan for 6-week postop visit          Dante Becker MD  Dept. Orthopedic Surgery  [facility]       Disclaimer: This note consists of symbols derived from keyboarding, dictation and/or voice recognition software. As a result, there may be errors in the script that have gone undetected. Please consider this when interpreting information found in this chart.

## 2021-03-29 NOTE — PATIENT INSTRUCTIONS
Okay to drive as long as you are not taking any pain medication  Use a pillow when you drive to avoid the deep flexion of the left hip  Continue  aspirin for DVT prophylaxis  Follow-up with Nate Javed PA-C for 6-week postop visit

## 2021-03-29 NOTE — LETTER
3/29/2021         RE: Kandis Tse  [address]  [place] [address]        Dear Colleague,    Thank you for referring your patient, Kandis Tse, to the [facility] [place]. Please see a copy of my visit note below.    Subjective:  Kandis Tse is a 75 year old female who is seen in f/u up for left total hip arthoplasty, DOS 3/16/21.      Objective:   Left hip incision is healing well  No evidence of drainage or infection  Minimal to mild ecchymosis around incision  No peripheral swelling      Imaging studies:   None today        Assessment:   Doing well from left total hip arthroplasty        Plan:   Gradually increase her walking.  Continue to exercise dislocation precaution  Okay to discontinue home health therapy  Follow-up with Nate Donovan for 6-week postop visit          Dante Becker MD  Dept. Orthopedic Surgery  [facility]       Disclaimer: This note consists of symbols derived from keyboarding, dictation and/or voice recognition software. As a result, there may be errors in the script that have gone undetected. Please consider this when interpreting information found in this chart.          Again, thank you for allowing me to participate in the care of your patient.        Sincerely,        Dante Becker MD

## 2021-04-26 ENCOUNTER — OFFICE VISIT (OUTPATIENT)
Dept: ORTHOPEDICS | Facility: CLINIC | Age: 76
End: 2021-04-26
Payer: COMMERCIAL

## 2021-04-26 ENCOUNTER — ANCILLARY PROCEDURE (OUTPATIENT)
Dept: GENERAL RADIOLOGY | Facility: CLINIC | Age: 76
End: 2021-04-26
Attending: PHYSICIAN ASSISTANT
Payer: COMMERCIAL

## 2021-04-26 DIAGNOSIS — Z96.642 S/P HIP REPLACEMENT, LEFT: ICD-10-CM

## 2021-04-26 DIAGNOSIS — Z47.89 ORTHOPEDIC AFTERCARE: Primary | ICD-10-CM

## 2021-04-26 PROCEDURE — 99024 POSTOP FOLLOW-UP VISIT: CPT | Performed by: PHYSICIAN ASSISTANT

## 2021-04-26 PROCEDURE — 73502 X-RAY EXAM HIP UNI 2-3 VIEWS: CPT | Mod: LT | Performed by: ORTHOPAEDIC SURGERY

## 2021-04-26 NOTE — PATIENT INSTRUCTIONS
Please avoid invasive procedure for 4 months following your surgery.  After the 4 months, based on recommendations by the orthopedic and dental associations, there is no need to use an antibiotic prior to any invasive procedure such as dental work, colonoscopy or surgery unless you are at risk for an infection.  Risks include cancer, immune system diseases, prior joint or chronic infections, and any medication that suppresses or weakens your immune system.  Please notify any providers of your implant prior to invasive procedures and if you have any risk factors or your health has changed since the last procedure. An antibiotic may be prescribed for you.    You may increase your activities as you can tolerate them.  Walking is a good activity.    No bending past 90 degrees at the hip joint, do not cross your legs, or perform excessive twisting at the hip for 4 months from surgery.    Caution with Yoga; do not lever on the hip, avoid excessive stretch and do not violate the restrictions as above.    Please follow up in 6 weeks if you have issues or with your surgeon at 1 year from the surgery date for evaluation.

## 2021-04-26 NOTE — LETTER
4/26/2021         RE: Kandis Tse  [address]  [place] [address]        Dear Colleague,    Thank you for referring your patient, Kandis Tse, to the [facility] [place]. Please see a copy of my visit note below.    HISTORY OF PRESENT ILLNESS:    Kandis Tse is a 75 year old female who is seen in follow up for Left ELBA, DOS 03/16/2021, Dr. Becker.  Present symptoms: doing well.  No cane, no pain medication, still not alternating on stairs as she can feel it and some pain if she wake up sleeping on the right side.  Does not remember to not bend past 90 and went to the dentist recently, was turned away.  Denies Chest pain, Calve pain, Fever, Chills.    Current Treatment: postop, restrictions.    PHYSICAL EXAM:  There were no vitals taken for this visit.  There is no height or weight on file to calculate BMI.   GENERAL APPEARANCE: healthy, alert and no distress   PSYCH:  mentation appears normal and affect normal/bright    MSK:  Left: HIp .  Ambulates: WNG.  Incision clean and dry, well healed.   Edema none a ankle.  CMS: agusto incisional numbness, otherwise grossly intact.  Strength: abduction seated 5-/5.  Observation:  Pt flexes hip past 90 multiple times during conversation, touches shoes.      IMAGING INTERPRETATION:  XR pelvis with left hip.    Recent Results (from the past 24 hour(s))   XR Pelvis and Hip Left 1 View    Narrative    History: Follow-up of left total hip arthroplasty, March 16, 2021.  Impression: AP pelvis and lateral left hip x-rays demonstrate satisfactory   progression of the bony incorporation to the total hip arthroplasty   prostheses with well-maintained alignment.  No acute interval changes are   noted compared to the immediate postoperative x-rays of March 16, 2021.     Dr. Dante Becker MD  4/26/2021     ASSESSMENT:  Kandis Tse is a 75 year old female S/P Left ELBA, DOS 03/16/2021, Dr. Becker.    Non compliant with  instructions.  Otherwise doing well.     PLAN:  - Surgery discussed, images reviewed if applicable, and all questions were answered at this time.  - care instructions given and verbally acknowledged.  - Medications: none per ortho.  - Hip restrictions, no dentist for 4 months post op.  - Discussed need to be careful when returning to yoga, activities.    Return to clinic 6, weeks or at 1 year postop.    Nolberto Donovan PA-C    Dept. Orthopedic Surgery  [facility]   4/26/2021          Again, thank you for allowing me to participate in the care of your patient.        Sincerely,        Nolberto Donovan PA-C

## 2021-04-26 NOTE — PROGRESS NOTES
HISTORY OF PRESENT ILLNESS:    Kandis Tse is a 75 year old female who is seen in follow up for Left ELBA, DOS 03/16/2021, Dr. Becker.  Present symptoms: doing well.  No cane, no pain medication, still not alternating on stairs as she can feel it and some pain if she wake up sleeping on the right side.  Does not remember to not bend past 90 and went to the dentist recently, was turned away.  Denies Chest pain, Calve pain, Fever, Chills.    Current Treatment: postop, restrictions.    PHYSICAL EXAM:  There were no vitals taken for this visit.  There is no height or weight on file to calculate BMI.   GENERAL APPEARANCE: healthy, alert and no distress   PSYCH:  mentation appears normal and affect normal/bright    MSK:  Left: HIp .  Ambulates: WNG.  Incision clean and dry, well healed.   Edema none a ankle.  CMS: agusto incisional numbness, otherwise grossly intact.  Strength: abduction seated 5-/5.  Observation:  Pt flexes hip past 90 multiple times during conversation, touches shoes.      IMAGING INTERPRETATION:  XR pelvis with left hip.    Recent Results (from the past 24 hour(s))   XR Pelvis and Hip Left 1 View    Narrative    History: Follow-up of left total hip arthroplasty, March 16, 2021.  Impression: AP pelvis and lateral left hip x-rays demonstrate satisfactory   progression of the bony incorporation to the total hip arthroplasty   prostheses with well-maintained alignment.  No acute interval changes are   noted compared to the immediate postoperative x-rays of March 16, 2021.     Dr. Dante Bekcer MD  4/26/2021     ASSESSMENT:  Kandis Tse is a 75 year old female S/P Left ELBA, DOS 03/16/2021, Dr. Becker.    Non compliant with instructions.  Otherwise doing well.     PLAN:  - Surgery discussed, images reviewed if applicable, and all questions were answered at this time.  - care instructions given and verbally acknowledged.  - Medications: none per ortho.  - Hip restrictions, no dentist for 4 months post  op.  - Discussed need to be careful when returning to yoga, activities.    Return to clinic 6, weeks or at 1 year postop.    Nolberto Donovan PA-C    Dept. Orthopedic Surgery  [facility]   4/26/2021

## 2021-05-12 DIAGNOSIS — Z53.9 DIAGNOSIS NOT YET DEFINED: Primary | ICD-10-CM

## 2021-05-12 PROCEDURE — G0179 MD RECERTIFICATION HHA PT: HCPCS | Performed by: ORTHOPAEDIC SURGERY

## 2021-05-27 NOTE — PROGRESS NOTES
"Office Visit - Physical    Kandis Tse   73 y.o. female    Date of Visit: 3/26/2019    Chief Complaint   Patient presents with     Annual Wellness Visit     Pt is fasting       Subjective: Delightful 73-year-old retired  here for annual physical and wellness visit.  Previous evaluation for chest pain and underwent coronary arteriography which revealed no occlusive disease although was hospitalized in 2015 at [facility] and felt to have \"stress cardiomyopathy\" her follow-up echoes were normal.    Underwent successful foot surgery last year stable no long-term complications    Previous history of osteopenia with T12 compression fracture and successful vertebroplasty    Remote history of breast malignancy over 20 years ago stable no recurrence has bilateral breast implants    Personal social history reviewed    Wellness information including health risk assessment cognitive assessment mood assessment and advanced directives reviewed    Generally she feels well but does have modest exertional dyspnea.  No symptoms of angina.  Denies cough or other pulmonary symptoms.    Dyspnea is modest in intensity overall she feels well she notes some dyspnea with relatively medial tasks such as making the bed though this is intermittent she is able to exercise but does note dyspnea with group exercises        ROS: A comprehensive review of systems was performed and was otherwise negative except as mentioned above.    Exam   Alert and oriented vital signs stable EENT normal skin and lymphatics negative breasts bilateral implants no masses lungs clear heart normal abdomen benign extremities negative peripheral pulses normal neuro exam negative  /62 (Patient Site: Right Arm, Patient Position: Sitting)   Pulse 72   Ht 5' 7.5\" (1.715 m)   Wt 153 lb (69.4 kg)   SpO2 99%   BMI 23.61 kg/m      Assessment and Plan    Stable wellness exam history as noted labs pending    Mild exertional dyspnea normal " exam we will repeat echo to assess left ventricular function chest x-ray today is negative follow-up when above eun Marquis was seen today for annual wellness visit.    Diagnoses and all orders for this visit:    T12 compression fracture (H)  -     HM2(CBC w/o Differential); Future  -     Comprehensive Metabolic Panel; Future  -     HM2(CBC w/o Differential)  -     Comprehensive Metabolic Panel    Closed compression fracture of thoracic vertebra, initial encounter (H)  -     HM2(CBC w/o Differential); Future  -     Comprehensive Metabolic Panel; Future  -     HM2(CBC w/o Differential)  -     Comprehensive Metabolic Panel    Stress-induced cardiomyopathy  -     HM2(CBC w/o Differential); Future  -     Comprehensive Metabolic Panel; Future  -     Echo Complete; Future  -     HM2(CBC w/o Differential)  -     Comprehensive Metabolic Panel    Osteopenia  -     HM2(CBC w/o Differential); Future  -     Comprehensive Metabolic Panel; Future  -     HM2(CBC w/o Differential)  -     Comprehensive Metabolic Panel    CONTI (dyspnea on exertion)  -     HM2(CBC w/o Differential); Future  -     Comprehensive Metabolic Panel; Future  -     XR Chest 2 Views  -     Echo Complete; Future  -     HM2(CBC w/o Differential)  -     Comprehensive Metabolic Panel    Metabolic syndrome              Medications:   Prior to Admission medications    Medication Sig Start Date End Date Taking? Authorizing Provider   albuterol (PROAIR HFA;PROVENTIL HFA;VENTOLIN HFA) 90 mcg/actuation inhaler Inhale 2 puffs. 9/30/17  Yes PROVIDER, HISTORICAL   alendronate (FOSAMAX) 70 MG tablet Take 1 tablet (70 mg total) by mouth every 7 days. 10/27/16  Yes Vineet De La Paz MD   aspirin 81 MG EC tablet Take 81 mg by mouth daily.    Yes PROVIDER, HISTORICAL   carvedilol (COREG) 12.5 MG tablet Take 1 tablet (12.5 mg total) by mouth 2 (two) times a day with meals. 11/22/18  Yes Vineet De La Paz MD   citalopram (CELEXA) 20 MG tablet TAKE ONE TABLET BY MOUTH ONE  TIME DAILY  1/8/19  Yes Vineet De La Paz MD   multivitamin with minerals (THERA-M) 9 mg iron-400 mcg Tab tablet Take 1 tablet by mouth daily.   Yes PROVIDER, HISTORICAL   naproxen (NAPROSYN) 500 MG tablet TAKE ONE TABLET BY MOUTH TWICE DAILY WITH A MEAL 2/25/19  Yes Vineet De La Paz MD   polyvinyl alcohol (LIQUIFILM TEARS) 1.4 % ophthalmic solution Administer 1 drop to both eyes as needed for dry eyes.   Yes PROVIDER, HISTORICAL   zonisamide (ZONEGRAN) 25 MG capsule Take 25 mg by mouth daily.    Yes PROVIDER, HISTORICAL   fluticasone (FLOVENT HFA) 220 mcg/actuation inhaler Inhale 2 puffs 2 (two) times a day. 10/2/17 3/26/19  Vineet De La Paz MD   ondansetron (ZOFRAN) 4 MG tablet TAKE ONE TABLET BY MOUTH EVERY EIGHT HOURS AS NEEDED FOR NAUSEA 8/14/18 3/26/19  Vineet De La Paz MD       Allergies:No Known Allergies    Immunizations:   Immunization History   Administered Date(s) Administered     Influenza, inj, historic,unspecified 10/04/2016, 11/15/2018     Pneumo Conj 13-V (2010&after) 11/17/2016       Past Medical History:   Past Medical History:   Diagnosis Date     Anxiety Disorder NOS     Created by Conversion  Replacement Utility updated for latest IMO load     Breast cancer (H) Early 1990's     Hx of radiation therapy Early 1990's     MI, old      Osteopenia     Created by Conversion  Replacement Utility updated for latest IMO load     Peripheral vertigo     Created by Conversion  Replacement Utility updated for latest IMO load     Stress-induced cardiomyopathy      T12 compression fracture (H) 10/7/2016       Past Surgical History:   Past Surgical History:   Procedure Laterality Date     AUGMENTATION MAMMAPLASTY Bilateral Late 1990's     BREAST BIOPSY Left Early 1990's     BREAST LUMPECTOMY Left Early 1990's     HYSTERECTOMY  Early 1990's     lumpectomy       [place] TOTAL ABDOM HYSTERECTOMY      Description: Hysterectomy;  Recorded: 06/17/2013;       Family History:   Family History   Problem Relation Age of Onset      Breast cancer Mother         Post Menopausal       Social History:   Social History     Socioeconomic History     Marital status:      Spouse name: Not on file     Number of children: Not on file     Years of education: Not on file     Highest education level: Not on file   Occupational History     Not on file   Social Needs     Financial resource strain: Not on file     Food insecurity:     Worry: Not on file     Inability: Not on file     Transportation needs:     Medical: Not on file     Non-medical: Not on file   Tobacco Use     Smoking status: Former Smoker     Smokeless tobacco: Never Used   Substance and Sexual Activity     Alcohol use: Yes     Alcohol/week: 3.0 oz     Types: 5 Glasses of wine per week     Drug use: No     Sexual activity: Yes     Birth control/protection: None   Lifestyle     Physical activity:     Days per week: Not on file     Minutes per session: Not on file     Stress: Not on file   Relationships     Social connections:     Talks on phone: Not on file     Gets together: Not on file     Attends Shinto service: Not on file     Active member of club or organization: Not on file     Attends meetings of clubs or organizations: Not on file     Relationship status: Not on file     Intimate partner violence:     Fear of current or ex partner: Not on file     Emotionally abused: Not on file     Physically abused: Not on file     Forced sexual activity: Not on file   Other Topics Concern     Not on file   Social History Narrative     Not on file         Vineet De La Paz MD      Assessment and Plan:           The patient's current medical problems were reviewed.      The following health maintenance schedule was reviewed with the patient and provided in printed form in the after visit summary:   Health Maintenance   Topic Date Due     TD 18+ HE  10/17/1963     ZOSTER VACCINES (1 of 2) 10/17/1995     PNEUMOCOCCAL POLYSACCHARIDE VACCINE AGE 65 AND OVER  10/17/2010     DXA SCAN   01/30/2019     FALL RISK ASSESSMENT  04/03/2019     MAMMOGRAM  03/20/2020     COLONOSCOPY  01/12/2021     ADVANCE DIRECTIVES DISCUSSED WITH PATIENT  07/25/2021     INFLUENZA VACCINE RULE BASED  Completed     PNEUMOCOCCAL CONJUGATE VACCINE FOR ADULTS (PCV13 OR PREVNAR)  Completed        Subjective:   Chief Complaint: Kandis Tse is an 73 y.o. female here for an Annual Wellness visit.   HPI:      Review of Systems:    Please see above.  The rest of the review of systems are negative for all systems.    Patient Care Team:  Vineet De La Paz MD as PCP - General     Patient Active Problem List   Diagnosis     Anxiety Disorder NOS     Peripheral Vertigo     Osteopenia     Stress-induced cardiomyopathy     Thoracic compression fracture (H)     T12 compression fracture (H)     Back pain     CAD (coronary artery disease)     Bilateral low back pain with sciatica, sciatica laterality unspecified, unspecified chronicity     T12 compression fracture, initial encounter     Bronchospasm, acute     Past Medical History:   Diagnosis Date     Anxiety Disorder NOS     Created by Conversion  Replacement Utility updated for latest IMO load     Breast cancer (H) Early 1990's     Hx of radiation therapy Early 1990's     MI, old      Osteopenia     Created by Conversion  Replacement Utility updated for latest IMO load     Peripheral vertigo     Created by Conversion  Replacement Utility updated for latest IMO load     Stress-induced cardiomyopathy      T12 compression fracture (H) 10/7/2016      Past Surgical History:   Procedure Laterality Date     AUGMENTATION MAMMAPLASTY Bilateral Late 1990's     BREAST BIOPSY Left Early 1990's     BREAST LUMPECTOMY Left Early 1990's     HYSTERECTOMY  Early 1990's     lumpectomy       IN TOTAL ABDOM HYSTERECTOMY      Description: Hysterectomy;  Recorded: 06/17/2013;      Family History   Problem Relation Age of Onset     Breast cancer Mother         Post Menopausal      Social History      Socioeconomic History     Marital status:      Spouse name: Not on file     Number of children: Not on file     Years of education: Not on file     Highest education level: Not on file   Occupational History     Not on file   Social Needs     Financial resource strain: Not on file     Food insecurity:     Worry: Not on file     Inability: Not on file     Transportation needs:     Medical: Not on file     Non-medical: Not on file   Tobacco Use     Smoking status: Former Smoker     Smokeless tobacco: Never Used   Substance and Sexual Activity     Alcohol use: Yes     Alcohol/week: 3.0 oz     Types: 5 Glasses of wine per week     Drug use: No     Sexual activity: Yes     Birth control/protection: None   Lifestyle     Physical activity:     Days per week: Not on file     Minutes per session: Not on file     Stress: Not on file   Relationships     Social connections:     Talks on phone: Not on file     Gets together: Not on file     Attends Confucianism service: Not on file     Active member of club or organization: Not on file     Attends meetings of clubs or organizations: Not on file     Relationship status: Not on file     Intimate partner violence:     Fear of current or ex partner: Not on file     Emotionally abused: Not on file     Physically abused: Not on file     Forced sexual activity: Not on file   Other Topics Concern     Not on file   Social History Narrative     Not on file      Current Outpatient Medications   Medication Sig Dispense Refill     albuterol (PROAIR HFA;PROVENTIL HFA;VENTOLIN HFA) 90 mcg/actuation inhaler Inhale 2 puffs.       alendronate (FOSAMAX) 70 MG tablet Take 1 tablet (70 mg total) by mouth every 7 days. 4 tablet 11     aspirin 81 MG EC tablet Take 81 mg by mouth daily.        carvedilol (COREG) 12.5 MG tablet Take 1 tablet (12.5 mg total) by mouth 2 (two) times a day with meals. 180 tablet 0     citalopram (CELEXA) 20 MG tablet TAKE ONE TABLET BY MOUTH ONE TIME DAILY  90  "tablet 0     multivitamin with minerals (THERA-M) 9 mg iron-400 mcg Tab tablet Take 1 tablet by mouth daily.       naproxen (NAPROSYN) 500 MG tablet TAKE ONE TABLET BY MOUTH TWICE DAILY WITH A MEAL 60 tablet 6     polyvinyl alcohol (LIQUIFILM TEARS) 1.4 % ophthalmic solution Administer 1 drop to both eyes as needed for dry eyes.       zonisamide (ZONEGRAN) 25 MG capsule Take 25 mg by mouth daily.        No current facility-administered medications for this visit.       Objective:   Vital Signs:   Visit Vitals  /62 (Patient Site: Right Arm, Patient Position: Sitting)   Pulse 72   Ht 5' 7.5\" (1.715 m)   Wt 153 lb (69.4 kg)   SpO2 99%   BMI 23.61 kg/m         VisionScreening:  No exam data present     PHYSICAL EXAM    Assessment Results 3/26/2019   Activities of Daily Living No help needed   Instrumental Activities of Daily Living No help needed   Get Up and Go Score Less than 12 seconds   Mini Cog Total Score 5   Some recent data might be hidden     A Mini-Cog score of 0-2 suggests the possibility of dementia, score of 3-5 suggests no dementia    Identified Health Risks:     Information regarding advance directives (living [place]), including where she can download the appropriate form, was provided to the patient via the AVS.       "

## 2021-05-31 VITALS — WEIGHT: 146 LBS | HEIGHT: 70 IN | BODY MASS INDEX: 20.9 KG/M2

## 2021-05-31 NOTE — TELEPHONE ENCOUNTER
Refill Approved    Rx renewed per Medication Renewal Policy. Medication was last renewed on:  Coreg for 180/0  11/22/2018;  Citalopram for 90/0 on 5/5/2019  Kast OV 3/26/2019 PE  Reyna Massey, [facility] Connection Triage/Med Refill 8/7/2019     Requested Prescriptions   Pending Prescriptions Disp Refills     carvedilol (COREG) 12.5 MG tablet 180 tablet 0     Sig: Take 1 tablet (12.5 mg total) by mouth 2 (two) times a day with meals.       Beta-Blockers Refill Protocol Passed - 8/6/2019  4:09 PM        Passed - PCP or prescribing provider visit in past 12 months or next 3 months     Last office visit with prescriber/PCP: 10/2/2017 Vineet De La Paz MD OR same dept: Visit date not found OR same specialty: 10/2/2017 Vineet De La Paz MD  Last physical: 3/26/2019 Last MTM visit: Visit date not found   Next visit within 3 mo: Visit date not found  Next physical within 3 mo: Visit date not found  Prescriber OR PCP: Vineet De La Paz MD  Last diagnosis associated with med order: 1. Stress-induced cardiomyopathy  - carvedilol (COREG) 12.5 MG tablet; Take 1 tablet (12.5 mg total) by mouth 2 (two) times a day with meals.  Dispense: 180 tablet; Refill: 0    2. CAD (coronary artery disease)  - carvedilol (COREG) 12.5 MG tablet; Take 1 tablet (12.5 mg total) by mouth 2 (two) times a day with meals.  Dispense: 180 tablet; Refill: 0    3. Anxiety Disorder NOS  - citalopram (CELEXA) 20 MG tablet; Take 1 tablet (20 mg total) by mouth daily.  Dispense: 90 tablet; Refill: 0    If protocol passes may refill for 12 months if within 3 months of last provider visit (or a total of 15 months).             Passed - Blood pressure filed in past 12 months     BP Readings from Last 1 Encounters:   04/04/19 110/70             citalopram (CELEXA) 20 MG tablet 90 tablet 0     Sig: Take 1 tablet (20 mg total) by mouth daily.       SSRI Refill Protocol  Passed - 8/6/2019  4:09 PM        Passed - PCP or prescribing provider visit in last year     Last  office visit with prescriber/PCP: 10/2/2017 Vineet De La Paz MD OR same dept: Visit date not found OR same specialty: 10/2/2017 Vineet De La Paz MD  Last physical: 3/26/2019 Last MTM visit: Visit date not found   Next visit within 3 mo: Visit date not found  Next physical within 3 mo: Visit date not found  Prescriber OR PCP: Vineet De La Paz MD  Last diagnosis associated with med order: 1. Stress-induced cardiomyopathy  - carvedilol (COREG) 12.5 MG tablet; Take 1 tablet (12.5 mg total) by mouth 2 (two) times a day with meals.  Dispense: 180 tablet; Refill: 0    2. CAD (coronary artery disease)  - carvedilol (COREG) 12.5 MG tablet; Take 1 tablet (12.5 mg total) by mouth 2 (two) times a day with meals.  Dispense: 180 tablet; Refill: 0    3. Anxiety Disorder NOS  - citalopram (CELEXA) 20 MG tablet; Take 1 tablet (20 mg total) by mouth daily.  Dispense: 90 tablet; Refill: 0    If protocol passes may refill for 12 months if within 3 months of last provider visit (or a total of 15 months).

## 2021-06-01 VITALS — BODY MASS INDEX: 20.19 KG/M2 | WEIGHT: 141 LBS | HEIGHT: 70 IN

## 2021-06-02 VITALS — BODY MASS INDEX: 23.19 KG/M2 | WEIGHT: 153 LBS | HEIGHT: 68 IN

## 2021-06-02 VITALS — WEIGHT: 153 LBS | BODY MASS INDEX: 23.19 KG/M2 | HEIGHT: 68 IN

## 2021-06-07 NOTE — TELEPHONE ENCOUNTER
Former patient of Dr INO De La Paz & has not established care with another provider.  Please assign refill request to covering provider per Clinic standard process.      RN cannot approve Refill Request    RN can NOT refill this medication med is not covered by policy/route to provider. Last office visit: 10/2/2017 Vineet De La Paz MD Last Physical: 3/26/2019 Last MTM visit: Visit date not found Last visit same specialty: 10/2/2017 Vineet De La Paz MD.  Next visit within 3 mo: Visit date not found  Next physical within 3 mo: Visit date not found      Rebecca Lugo, [facility] Triage/Med Refill 5/2/2020    Requested Prescriptions   Pending Prescriptions Disp Refills     naproxen (NAPROSYN) 500 MG tablet [Pharmacy Med Name: Naproxen Oral Tablet 500 MG] 60 tablet 0     Sig: TAKE ONE TABLET BY MOUTH TWICE A DAY WITH MEALS       There is no refill protocol information for this order

## 2021-06-09 NOTE — TELEPHONE ENCOUNTER
RN cannot approve Refill Request    RN can NOT refill this medication No established PCP. Last office visit: Visit date not found Last Physical: Visit date not found Last MTM visit: Visit date not found Last visit same specialty: 10/2/2017 Vineet De La Paz MD.  Next visit within 3 mo: Visit date not found  Next physical within 3 mo: Visit date not found      Juliet Hernández, [facility] Triage/Med Refill 7/12/2020    Requested Prescriptions   Pending Prescriptions Disp Refills     naproxen (NAPROSYN) 500 MG tablet [Pharmacy Med Name: Naproxen Oral Tablet 500 MG] 60 tablet 0     Sig: TAKE ONE TABLET BY MOUTH TWICE A DAY WITH MEALS       There is no refill protocol information for this order

## 2021-06-13 NOTE — PROGRESS NOTES
"Office Visit - Follow up    Kandis Tse   71 y.o. female    Date of Visit: 10/2/2017    Chief Complaint   Patient presents with     Follow-up     Urgency room       Subjective: Delightful 71-year-old partially retired  presents for follow-up after recent extensive evaluation and the [facility] \"urgency center\" for evaluation of palpitations and dyspnea.    Background includes history of compression fracture causing a great deal of pain ultimately treated with vertebroplasty and a history of \"stress-induced cardiomyopathy\" which has been stable.    In recent weeks she has been under a great deal of stress because of her 's recovery from knee surgery after falling sustaining a fracture.  He has been in a transitional care unit and will be coming home.  She also has a special needs daughter which is extremely demanding of her care she has noted symptoms of intermittent palpitations dyspnea and anxiety and was actually sent home from work because of feeling poorly last week.    She presented on the evening of 932 urgency center and these notes are reviewed    Extensive evaluation including chest x-ray EKG and labs.  Was felt to have acute bronchospasm was treated with systemic steroids and albuterol inhaler.  These notes are reviewed    Generally today she feels better she does have symptoms of intermittent palpitations with occasional wheezes she is continuing a systemic dose of prednisone for another 2 doses.  She denies fever chills or significant cough or other symptoms suggestive of pneumonia.  Intermittent palpitations occur when she is dyspneic    Current medications reviewed    No other new concerns no symptoms of chest pressure or pain suggestive of angina    History of coronary disease as noted however had non-ST segment elevation infarction with minimal elevation of troponin and demonstrated normal coronaries i.e. likely had a stress cardiomyopathy.        ROS: A comprehensive review " "of systems was performed and was otherwise negative except as mentioned above.     Exam  Alert oriented mildly anxious head and neck negative EENT negative lungs minimal wheezes no signs of consolidation heart normal with regular rhythm blood pressure is noted   /80  Pulse 78  Ht 5' 10\" (1.778 m)  Wt 146 lb (66.2 kg)  BMI 20.95 kg/m2    Assessment and Plan  Acute bronchospasm may in part be stress induced as well.  History of stress cardiomyopathy without evidence of heart failure at present time.    We will continue with completion of course of systemic steroids i.e. another 2 doses.  Will follow this with fluticasone 220 oral inhaler twice daily.  Continue with albuterol as necessary.    We will increase carvedilol to 12.5 mg twice daily and will follow up with the patient in 3 weeks.    She indicates she will be requesting a leave of absence.  We can certainly authorize leave of absence for health reasons given her current symptoms also could eat fill out paperwork for [place] for care of family members namely her  and daughter this was discussed as well    Kandis was seen today for follow-up.    Diagnoses and all orders for this visit:    Stress-induced cardiomyopathy    Bronchospasm, acute    Anxiety Disorder NOS    Other orders  -     fluticasone (FLOVENT HFA) 220 mcg/actuation inhaler; Inhale 2 puffs 2 (two) times a day.  -     carvedilol (COREG) 12.5 MG tablet; Take 1 tablet (12.5 mg total) by mouth 2 (two) times a day with meals.          Time: total time spent with the patient was 45 minutes of which >50% was spent in counseling and coordination of care        No Known Allergies    Medications :  Prior to Admission medications    Medication Sig Start Date End Date Taking? Authorizing Provider   albuterol (PROAIR HFA;PROVENTIL HFA;VENTOLIN HFA) 90 mcg/actuation inhaler Inhale 2 puffs. 9/30/17  Yes PROVIDER, HISTORICAL   alendronate (FOSAMAX) 70 MG tablet Take 1 tablet (70 mg total) by mouth " every 7 days. 10/27/16  Yes Vineet De La Paz MD   aspirin 81 MG EC tablet Take 81 mg by mouth daily.    Yes PROVIDER, HISTORICAL   carvedilol (COREG) 12.5 MG tablet Take 1 tablet (12.5 mg total) by mouth 2 (two) times a day with meals. 10/2/17  Yes Vineet De La Paz MD   citalopram (CELEXA) 20 MG tablet Take 1 tablet (20 mg total) by mouth daily. 10/21/16  Yes Vineet De La Paz MD   multivitamin with minerals (THERA-M) 9 mg iron-400 mcg Tab tablet Take 1 tablet by mouth daily.   Yes PROVIDER, HISTORICAL   naproxen (NAPROSYN) 500 MG tablet TAKE ONE TABLET BY MOUTH TWICE A DAY WITH MEALS  11/15/16  Yes Vineet De La Paz MD   zonisamide (ZONEGRAN) 25 MG capsule Take 25 mg by mouth daily.    Yes PROVIDER, HISTORICAL   carvedilol (COREG) 3.125 MG tablet TAKE ONE TABLET BY MOUTH TWICE DAILY with meals 8/7/17 10/2/17 Yes Vineet De La Paz MD   predniSONE (DELTASONE) 20 MG tablet Take 20 mg by mouth. 9/30/17 10/2/17 Yes PROVIDER, HISTORICAL   fluticasone (FLOVENT HFA) 220 mcg/actuation inhaler Inhale 2 puffs 2 (two) times a day. 10/2/17   Vineet De La Paz MD   polyvinyl alcohol (LIQUIFILM TEARS) 1.4 % ophthalmic solution Administer 1 drop to both eyes as needed for dry eyes.    PROVIDER, HISTORICAL        Past Medical History:   Past Medical History:   Diagnosis Date     Anxiety Disorder NOS     Created by Conversion  Replacement Utility updated for latest IMO load     Breast cancer Early 1990's     Hx of radiation therapy Early 1990's     MI, old      Osteopenia     Created by Conversion  Replacement Utility updated for latest IMO load     Peripheral vertigo     Created by Conversion  Replacement Utility updated for latest IMO load     Stress-induced cardiomyopathy      T12 compression fracture 10/7/2016       Past Surgical History:   Past Surgical History:   Procedure Laterality Date     AUGMENTATION MAMMAPLASTY Bilateral Late 1990's     BREAST BIOPSY Left Early 1990's     BREAST LUMPECTOMY Left Early 1990's     HYSTERECTOMY  Early  1990's     lumpectomy       OR TOTAL ABDOM HYSTERECTOMY      Description: Hysterectomy;  Recorded: 06/17/2013;       Social History:   Social History     Social History     Marital status:      Spouse name: N/A     Number of children: N/A     Years of education: N/A     Occupational History     Not on file.     Social History Main Topics     Smoking status: Former Smoker     Smokeless tobacco: Never Used     Alcohol use 3.0 oz/week     5 Glasses of wine per week     Drug use: No     Sexual activity: Yes     Birth control/ protection: None     Other Topics Concern     Not on file     Social History Narrative       Family History:   Family History   Problem Relation Age of Onset     Breast cancer Mother      Post Menopausal         Vineet De La Paz MD

## 2021-06-14 NOTE — TELEPHONE ENCOUNTER
RN cannot approve Refill Request    RN can NOT refill this medication No established PCP. Last office visit: 10/2/2017 Vineet De La Paz MD Last Physical: 3/26/2019 Last MTM visit: Visit date not found Last visit same specialty: 10/2/2017 Vineet De La Paz MD.  Next visit within 3 mo: Visit date not found  Next physical within 3 mo: Visit date not found      Juliet Hernández, [facility] Triage/Med Refill 1/1/2021    Requested Prescriptions   Pending Prescriptions Disp Refills     carvediloL (COREG) 12.5 MG tablet [Pharmacy Med Name: Carvedilol Oral Tablet 12.5 MG] 180 tablet 0     Sig: TAKE ONE TABLET BY MOUTH TWICE A DAY WITH MEALS       [facility] Refill Protocol Failed - 1/1/2021 10:19 AM        Failed - PCP or prescribing provider visit in past 12 months or next 3 months     Last office visit with prescriber/PCP: 10/2/2017 Vineet De La Paz MD OR same dept: Visit date not found OR same specialty: 10/2/2017 Vineet De La Paz MD  Last physical: 3/26/2019 Last MTM visit: Visit date not found   Next visit within 3 mo: Visit date not found  Next physical within 3 mo: Visit date not found  Prescriber OR PCP: Vineet De La Paz MD  Last diagnosis associated with med order: 1. Stress-induced cardiomyopathy  - carvediloL (COREG) 12.5 MG tablet [Pharmacy Med Name: Carvedilol Oral Tablet 12.5 MG]; TAKE ONE TABLET BY MOUTH TWICE A DAY WITH MEALS   Dispense: 180 tablet; Refill: 0    2. CAD (coronary artery disease)  - carvediloL (COREG) 12.5 MG tablet [Pharmacy Med Name: Carvedilol Oral Tablet 12.5 MG]; TAKE ONE TABLET BY MOUTH TWICE A DAY WITH MEALS   Dispense: 180 tablet; Refill: 0    If protocol passes may refill for 12 months if within 3 months of last provider visit (or a total of 15 months).             Failed - Blood pressure filed in past 12 months     BP Readings from Last 1 Encounters:   04/04/19 110/70

## 2021-06-17 NOTE — PATIENT INSTRUCTIONS - HE
Patient Instructions by Vineet De La Paz MD at 3/26/2019  2:00 PM     Author: Vineet De La Paz MD Service: -- Author Type: Physician    Filed: 3/26/2019  5:15 PM Encounter Date: 3/26/2019 Status: Signed    : Vineet De La Paz MD (Physician)         Patient Education   Understanding Advance Care Planning  Advance care planning is the process of deciding ones own future medical care. It helps ensure that if you cant speak for yourself, your wishes can still be carried out. The plan is a series of legal documents that note a persons wishes. The documents vary by state. Advance care planning may be done when a person has a serious illness that is expected to get worse. It may be done before major surgery. And it can help you and your family be prepared in case of a major illness or injury. Advance care planning helps with making decisions at these times.       A health care proxy is a person who acts as the voice of a patient when the patient cant speak for himself or herself. The name of this role varies by state. It may be called a Durable Medical Power of  or Durable Power of  for Healthcare. It may be called an agent, surrogate, or advocate. Or it may be called a representative or decision maker. It is an official duty that is identified by a legal document. The document also varies by state.    Why Is Advance Care Planning Important?  If a person communicates their healthcare wishes:    They will be given medical care that matches their values and goals.    Their family members will not be forced to make decisions in a crisis with no guidance.  Creating a Plan  Making an advance care plan is often done in 3 steps:    Thinking about ones wishes. To create an advance care plan, you should think about what kind of medical treatment you would want if you lose the ability to communicate. Are there any situations in which you would refuse or stop treatment? Are there therapies you would want or not  want? And whom do you want to make decisions for you? There are many places to learn more about how to plan for your care. Ask your doctor or  for resources.    Picking a health care proxy. This means choosing a trusted person to speak for you only when you cant speak for yourself. When you cannot make medical decisions, your proxy makes sure the instructions in your advance care plan are followed. A proxy does not make decisions based on his or her own opinions. They must put aside those opinions and values if needed, and carry out your wishes.    Filling out the legal documents. There are several kinds of legal documents for advance care planning. Each one tells health care providers your wishes. The documents may vary by state. They must be signed and may need to be witnessed or notarized. You can cancel or change them whenever you wish. Depending on your state, the documents may include a Healthcare Proxy form, Living Will, Durable Medical Power of , Advance Directive, or others.  The Familys Role  The best help a family can give is to support their loved ones wishes. Open and honest communication is vital. Family should express any concerns they have about the patients choices while the patient can still make decisions.    4484-5289 [employer]. [address] [place] [address], [place] [address]. All rights reserved. This information is not intended as a substitute for professional medical care. Always follow your healthcare professional's instructions.         Also, [employer] [place] offers a free, downloadable health care directive that allows you to share your treatment choices and personal preferences if you cannot communicate your wishes. It also allows you to appoint another person (called a health care agent) to make health care decisions if you are unable to do so. You can download an advance directive by going here: [email or web address]      Patient Education   Personalized Prevention Plan  You are due for the preventive services outlined below.  Your care team is available to assist you in scheduling these services.  If you have already completed any of these items, please share that information with your care team to update in your medical record.  Health Maintenance   Topic Date Due   ? TD 18+ HE  10/17/1963   ? ZOSTER VACCINES (1 of 2) 10/17/1995   ? PNEUMOCOCCAL POLYSACCHARIDE VACCINE AGE 65 AND OVER  10/17/2010   ? DXA SCAN  01/30/2019   ? FALL RISK ASSESSMENT  04/03/2019   ? MAMMOGRAM  03/20/2020   ? COLONOSCOPY  01/12/2021   ? ADVANCE DIRECTIVES DISCUSSED WITH PATIENT  07/25/2021   ? INFLUENZA VACCINE RULE BASED  Completed   ? PNEUMOCOCCAL CONJUGATE VACCINE FOR ADULTS (PCV13 OR PREVNAR)  Completed

## 2021-06-17 NOTE — PROGRESS NOTES
"Office Visit - Follow up    Kandis Tse   72 y.o. female    Date of Visit: 4/3/2018    Chief Complaint   Patient presents with     Pre-op Exam     Foot surgery on 4/18 at [facility] by Dr Carlisle       Subjective: Delightful recently retired  here for preoperative examination prior to surgery to foot performed by Dr. Edgar Carlisle at [facility].  Procedure will includes repair to both feet and see notes by Dr. Carlisle regarding surgical plans    Recent history includes evaluation for chest pain.  Underwent evaluation which included coronary arteriography which revealed no evidence of significant occlusive disease.  Hospitalized at [facility] in 2015 and was felt to have \"stress cardiomyopathy\" I do not have all of the records regarding this however she appears to have fully recovered and most recent echoes were normal without evidence of left ventricular dysfunction or of findings consistent with stress cardiomyopathy her follow-up EKG today is normal and she is asymptomatic from a cardiovascular standpoint and is functional class I    Previous history of osteopenia with a compression fracture treated with vertebroplasty and stable.  History of breast malignancy over 20 years ago without recurrence.  General health otherwise stable.  No known allergies    Personal social history reviewed as noted she is recently retired she lives at home with her  she has a special needs daughter she is active physically.  Non-smoker ethanol intake minimal    Family history none relevant    Generally feels well and has no other new complaints see Dr. Carlisle's note regarding foot symptoms    ROS: A comprehensive review of systems was performed and was otherwise negative except as mentioned above.     Exam  Alert and oriented head and neck normal skin and lymphatics negative EENT normal lungs clear heart negative abdomen benign breasts normal extremities negative " "peripheral pulses normal       /64  Pulse 78  Ht 5' 10\" (1.778 m)  Wt 141 lb (64 kg)  BMI 20.23 kg/m2    Assessment and Plan  Stable preoperative exam no changes labs pending at this time preoperative EKG stable    Kandis was seen today for pre-op exam.    Diagnoses and all orders for this visit:    Preop examination  -     Electrocardiogram Perform and Read  -     HM2(CBC w/o Differential); Future  -     Comprehensive Metabolic Panel; Future  -     Lipid Cascade  -     HM2(CBC w/o Differential)  -     Comprehensive Metabolic Panel    Stress-induced cardiomyopathy  -     HM2(CBC w/o Differential); Future  -     Comprehensive Metabolic Panel; Future  -     Lipid Cascade  -     HM2(CBC w/o Differential)  -     Comprehensive Metabolic Panel    Osteopenia  -     HM2(CBC w/o Differential); Future  -     Comprehensive Metabolic Panel; Future  -     Lipid Cascade  -     HM2(CBC w/o Differential)  -     Comprehensive Metabolic Panel    T12 compression fracture  -     HM2(CBC w/o Differential); Future  -     Comprehensive Metabolic Panel; Future  -     Lipid Cascade  -     HM2(CBC w/o Differential)  -     Comprehensive Metabolic Panel    CAD (coronary artery disease)  -     HM2(CBC w/o Differential); Future  -     Comprehensive Metabolic Panel; Future  -     Lipid Cascade  -     HM2(CBC w/o Differential)  -     Comprehensive Metabolic Panel          Time: total time spent with the patient was 40 minutes of which >50% was spent in counseling and coordination of care        No Known Allergies    Medications :  Prior to Admission medications    Medication Sig Start Date End Date Taking? Authorizing Provider   albuterol (PROAIR HFA;PROVENTIL HFA;VENTOLIN HFA) 90 mcg/actuation inhaler Inhale 2 puffs. 9/30/17  Yes PROVIDER, HISTORICAL   alendronate (FOSAMAX) 70 MG tablet Take 1 tablet (70 mg total) by mouth every 7 days. 10/27/16  Yes Vineet De La Paz MD   aspirin 81 MG EC tablet Take 81 mg by mouth daily.    Yes " PROVIDER, HISTORICAL   citalopram (CELEXA) 20 MG tablet Take 1 tablet (20 mg total) by mouth daily. 10/31/17  Yes Vineet De La Paz MD   fluticasone (FLOVENT HFA) 220 mcg/actuation inhaler Inhale 2 puffs 2 (two) times a day. 10/2/17  Yes Vineet De La Paz MD   multivitamin with minerals (THERA-M) 9 mg iron-400 mcg Tab tablet Take 1 tablet by mouth daily.   Yes PROVIDER, HISTORICAL   naproxen (NAPROSYN) 500 MG tablet TAKE ONE TABLET BY MOUTH TWICE A DAY WITH MEALS  2/21/18  Yes Vineet De La Paz MD   polyvinyl alcohol (LIQUIFILM TEARS) 1.4 % ophthalmic solution Administer 1 drop to both eyes as needed for dry eyes.   Yes PROVIDER, HISTORICAL   zonisamide (ZONEGRAN) 25 MG capsule Take 25 mg by mouth daily.    Yes PROVIDER, HISTORICAL   carvedilol (COREG) 12.5 MG tablet Take 1 tablet (12.5 mg total) by mouth 2 (two) times a day with meals. 10/2/17   Vineet De La Paz MD        Past Medical History:   Past Medical History:   Diagnosis Date     Anxiety Disorder NOS     Created by Conversion  Replacement Utility updated for latest IMO load     Breast cancer Early 1990's     Hx of radiation therapy Early 1990's     MI, old      Osteopenia     Created by Conversion  Replacement Utility updated for latest IMO load     Peripheral vertigo     Created by Conversion  Replacement Utility updated for latest IMO load     Stress-induced cardiomyopathy      T12 compression fracture 10/7/2016       Past Surgical History:   Past Surgical History:   Procedure Laterality Date     AUGMENTATION MAMMAPLASTY Bilateral Late 1990's     BREAST BIOPSY Left Early 1990's     BREAST LUMPECTOMY Left Early 1990's     HYSTERECTOMY  Early 1990's     lumpectomy       [place] TOTAL ABDOM HYSTERECTOMY      Description: Hysterectomy;  Recorded: 06/17/2013;       Social History:   Social History     Social History     Marital status:      Spouse name: N/A     Number of children: N/A     Years of education: N/A     Occupational History     Not on file.     Social  History Main Topics     Smoking status: Former Smoker     Smokeless tobacco: Never Used     Alcohol use 3.0 oz/week     5 Glasses of wine per week     Drug use: No     Sexual activity: Yes     Birth control/ protection: None     Other Topics Concern     Not on file     Social History Narrative       Family History:   Family History   Problem Relation Age of Onset     Breast cancer Mother      Post Menopausal         Vineet De La Paz MD

## 2021-06-19 NOTE — LETTER
Letter by Vineet De La Paz MD at      Author: Vineet De La Paz MD Service: -- Author Type: --    Filed:  Encounter Date: 4/2/2019 Status: (Other)         Kandis Tse  [address]  [place] [address]             April 2, 2019         Dear Ms. Tse,    Below are the results from your recent visit:    Resulted Orders   HM2(CBC w/o Differential)   Result Value Ref Range    WBC 7.4 4.0 - 11.0 thou/uL    RBC 4.80 3.80 - 5.40 mill/uL    Hemoglobin 14.4 12.0 - 16.0 g/dL    Hematocrit 42.4 35.0 - 47.0 %    MCV 88 80 - 100 fL    MCH 30.1 27.0 - 34.0 pg    MCHC 34.0 32.0 - 36.0 g/dL    RDW 13.1 11.0 - 14.5 %    Platelets 183 140 - 440 thou/uL    MPV 9.0 7.0 - 10.0 fL   Comprehensive Metabolic Panel   Result Value Ref Range    Sodium 140 136 - 145 mmol/L    Potassium 4.3 3.5 - 5.0 mmol/L    Chloride 103 98 - 107 mmol/L    CO2 25 22 - 31 mmol/L    Anion Gap, Calculation 12 5 - 18 mmol/L    Glucose 96 70 - 125 mg/dL    BUN 22 8 - 28 mg/dL    Creatinine 0.94 0.60 - 1.10 mg/dL    GFR MDRD Af Amer >60 >60 mL/min/1.73m2    GFR MDRD Non Af Amer 58 (L) >60 mL/min/1.73m2    Bilirubin, Total 0.4 0.0 - 1.0 mg/dL    Calcium 9.9 8.5 - 10.5 mg/dL    Protein, Total 7.4 6.0 - 8.0 g/dL    Albumin 4.3 3.5 - 5.0 g/dL    Alkaline Phosphatase 88 45 - 120 U/L    AST 17 0 - 40 U/L    ALT 10 0 - 45 U/L    Narrative    Fasting Glucose reference range is 70-99 mg/dL per  American Diabetes Association (ADA) guidelines.       Labs look excellent    Please call with questions or contact us using [employer].    Sincerely,        Electronically signed by Vineet De La Paz MD

## 2021-06-19 NOTE — LETTER
Letter by Vineet De La Paz MD at      Author: Vineet De La Paz MD Service: -- Author Type: --    Filed:  Encounter Date: 4/4/2019 Status: (Other)         Kandis Tse  [address]  [place] [address]             April 4, 2019         Dear Ms. Tse,    Below are the results from your recent visit:    Resulted Orders   Echo Complete   Result Value Ref Range    LV volume diastolic 41 (!) 46 - 106 cm3    LV volume systolic 11 (!) 14 - 42 cm3    LVOT mean gradient 2 mmHg    LVOT peak VTI 19 cm    LVOT mean rocky 63.4 cm/s    LVOT peak rocky 94.7 cm/s    LVOT peak gradient 4 mmHg    MV E' lat rocky 9.55 cm/s    MV E' med rocky 7.8 cm/s    MV decel time 384 ms    MV peak A rocky 95.3 cm/s    MV peak E rocky 67.6 cm/s    MV mean rocky 60.9 cm/s    MV mean gradient 2 mmHg    MV VTI 20.5 cm    MV peak velocityoctiy 108 cm/s    TR peak rocky 233 cm/s    LA area 1 10.4 cm2    LA length 3.33 cm    LA area 2 10 cm2    TAPSE 1.6 cm    BSA 1.82 m2    Hieght 67.5 in    Weight 2,448 lbs    /70 mmHg    HR 74 bpm    TR peak gradent 21.7 mmHg    Echo LVEF calculated 73 55 - 75 %    LA volume 26.5 mL    MV E/A Ratio 0.7     MV peak gradient 4.7 mmHg    LV systolic volume index 6.0 8 - 24 cm3/m2    LV diastolic volume index 22.5 29 - 61 cm3/m2    LA volume index 14.6 mL/m2    MV med E/e' ratio 8.7     MV lat E/e' ratio 7.1     Height 67.5 in    Weight 153 lbs    MV Avg E/e' Ratio 7.8 cm/s    Narrative      When compared to the previous study dated 9/16/2016, no significant   change.    Left ventricle ejection fraction is normal. The calculated left   ventricular ejection fraction is 73%.    Normal left ventricular size and systolic function.    Normal right ventricular size and systolic function.    No hemodynamically significant valvular heart abnormalities.          Everything looks excellent    Please call with questions or contact us using tapviva.    Sincerely,        Electronically signed by Vineet De La Paz MD

## 2021-06-22 NOTE — TELEPHONE ENCOUNTER
Refill Approved    Rx renewed per Medication Renewal Policy. Medication was last renewed on 10/31/17.    Chitra Becker, [facility] Triage/Med Refill 1/8/2019     Requested Prescriptions   Pending Prescriptions Disp Refills     citalopram (CELEXA) 20 MG tablet [Pharmacy Med Name: Citalopram Hydrobromide Oral Tablet 20 MG] 90 tablet 2     Sig: TAKE ONE TABLET BY MOUTH ONE TIME DAILY    SSRI Refill Protocol  Passed - 1/7/2019  9:53 AM       Passed - PCP or prescribing provider visit in last year    Last office visit with prescriber/PCP: 10/2/2017 Vineet De La Paz MD OR same dept: Visit date not found OR same specialty: 10/2/2017 Vineet De La Paz MD  Last physical: 4/3/2018 Last MTM visit: Visit date not found   Next visit within 3 mo: Visit date not found  Next physical within 3 mo: Visit date not found  Prescriber OR PCP: Vineet De La Paz MD  Last diagnosis associated with med order: 1. Anxiety Disorder NOS  - citalopram (CELEXA) 20 MG tablet [Pharmacy Med Name: Citalopram Hydrobromide Oral Tablet 20 MG]; TAKE ONE TABLET BY MOUTH ONE TIME DAILY   Dispense: 90 tablet; Refill: 2    If protocol passes may refill for 12 months if within 3 months of last provider visit (or a total of 15 months).

## 2021-08-17 DIAGNOSIS — I51.81 TAKOTSUBO CARDIOMYOPATHY: ICD-10-CM

## 2021-08-19 RX ORDER — CARVEDILOL 6.25 MG/1
6.25 TABLET ORAL 2 TIMES DAILY WITH MEALS
Qty: 180 TABLET | Refills: 0 | Status: SHIPPED | OUTPATIENT
Start: 2021-08-19 | End: 2022-01-03

## 2021-08-29 ENCOUNTER — HEALTH MAINTENANCE LETTER (OUTPATIENT)
Age: 76
End: 2021-08-29

## 2021-08-31 ENCOUNTER — ANCILLARY PROCEDURE (OUTPATIENT)
Dept: MAMMOGRAPHY | Facility: CLINIC | Age: 76
End: 2021-08-31
Attending: INTERNAL MEDICINE
Payer: COMMERCIAL

## 2021-08-31 DIAGNOSIS — Z12.31 VISIT FOR SCREENING MAMMOGRAM: ICD-10-CM

## 2021-08-31 PROCEDURE — 77067 SCR MAMMO BI INCL CAD: CPT | Mod: TC | Performed by: RADIOLOGY

## 2021-09-24 DIAGNOSIS — F39 MOOD DISORDER (H): ICD-10-CM

## 2021-09-24 RX ORDER — CITALOPRAM HYDROBROMIDE 20 MG/1
20 TABLET ORAL DAILY
Qty: 90 TABLET | Refills: 0 | Status: SHIPPED | OUTPATIENT
Start: 2021-09-24 | End: 2022-01-03

## 2021-09-24 NOTE — TELEPHONE ENCOUNTER
Routing refill request to provider for review/approval because:  Drug interaction warning    Denny LOWERY RN

## 2021-10-07 ENCOUNTER — TELEPHONE (OUTPATIENT)
Dept: PEDIATRICS | Facility: CLINIC | Age: 76
End: 2021-10-07

## 2021-10-07 PROBLEM — I25.10 CAD (CORONARY ARTERY DISEASE): Status: ACTIVE | Noted: 2021-10-07

## 2021-10-11 ENCOUNTER — MYC MEDICAL ADVICE (OUTPATIENT)
Dept: PEDIATRICS | Facility: CLINIC | Age: 76
End: 2021-10-11

## 2021-10-24 ENCOUNTER — HEALTH MAINTENANCE LETTER (OUTPATIENT)
Age: 76
End: 2021-10-24

## 2021-11-03 NOTE — TELEPHONE ENCOUNTER
TC:    Pt LM at 12:57 pm that she needs help to schedule an OV. Please call pt at [phone] to help with scheduling. Thanks.     Jeffrey RN  Patient Advocate Liason (PAL)  [facility]

## 2021-12-14 DIAGNOSIS — M54.50 CHRONIC MIDLINE LOW BACK PAIN WITHOUT SCIATICA: ICD-10-CM

## 2021-12-14 DIAGNOSIS — G89.29 CHRONIC MIDLINE LOW BACK PAIN WITHOUT SCIATICA: ICD-10-CM

## 2021-12-16 RX ORDER — NAPROXEN 250 MG/1
250 TABLET ORAL 2 TIMES DAILY PRN
Qty: 180 TABLET | Refills: 0 | Status: SHIPPED | OUTPATIENT
Start: 2021-12-16 | End: 2022-01-03

## 2021-12-16 NOTE — TELEPHONE ENCOUNTER
Routing refill request to provider for review/approval because:  Labs not current:  CBC, ALT and AST  Failing due to age    Jovita Mari RN

## 2022-01-03 ENCOUNTER — OFFICE VISIT (OUTPATIENT)
Dept: PEDIATRICS | Facility: CLINIC | Age: 77
End: 2022-01-03
Payer: COMMERCIAL

## 2022-01-03 VITALS
WEIGHT: 153.2 LBS | SYSTOLIC BLOOD PRESSURE: 122 MMHG | DIASTOLIC BLOOD PRESSURE: 74 MMHG | TEMPERATURE: 97.8 F | OXYGEN SATURATION: 98 % | RESPIRATION RATE: 16 BRPM | HEART RATE: 72 BPM | HEIGHT: 67 IN | BODY MASS INDEX: 24.04 KG/M2

## 2022-01-03 DIAGNOSIS — I51.81 TAKOTSUBO CARDIOMYOPATHY: ICD-10-CM

## 2022-01-03 DIAGNOSIS — Z00.00 ENCOUNTER FOR MEDICARE ANNUAL WELLNESS EXAM: Primary | ICD-10-CM

## 2022-01-03 DIAGNOSIS — R79.9 ABNORMAL FINDING OF BLOOD CHEMISTRY, UNSPECIFIED: ICD-10-CM

## 2022-01-03 DIAGNOSIS — F39 MOOD DISORDER (H): ICD-10-CM

## 2022-01-03 DIAGNOSIS — Z86.73 HISTORY OF CVA (CEREBROVASCULAR ACCIDENT): ICD-10-CM

## 2022-01-03 DIAGNOSIS — Z13.220 SCREENING FOR HYPERLIPIDEMIA: ICD-10-CM

## 2022-01-03 LAB
ALBUMIN SERPL-MCNC: 4 G/DL (ref 3.4–5)
ALP SERPL-CCNC: 69 U/L (ref 40–150)
ALT SERPL W P-5'-P-CCNC: 38 U/L (ref 0–50)
ANION GAP SERPL CALCULATED.3IONS-SCNC: 5 MMOL/L (ref 3–14)
AST SERPL W P-5'-P-CCNC: 27 U/L (ref 0–45)
BILIRUB SERPL-MCNC: 0.7 MG/DL (ref 0.2–1.3)
BUN SERPL-MCNC: 18 MG/DL (ref 7–30)
CALCIUM SERPL-MCNC: 9.5 MG/DL (ref 8.5–10.1)
CHLORIDE BLD-SCNC: 103 MMOL/L (ref 94–109)
CHOLEST SERPL-MCNC: 211 MG/DL
CO2 SERPL-SCNC: 28 MMOL/L (ref 20–32)
CREAT SERPL-MCNC: 0.77 MG/DL (ref 0.52–1.04)
FASTING STATUS PATIENT QL REPORTED: YES
GFR SERPL CREATININE-BSD FRML MDRD: 80 ML/MIN/1.73M2
GLUCOSE BLD-MCNC: 92 MG/DL (ref 70–99)
HDLC SERPL-MCNC: 98 MG/DL
LDLC SERPL CALC-MCNC: 98 MG/DL
NONHDLC SERPL-MCNC: 113 MG/DL
POTASSIUM BLD-SCNC: 4.1 MMOL/L (ref 3.4–5.3)
PROT SERPL-MCNC: 7.4 G/DL (ref 6.8–8.8)
SODIUM SERPL-SCNC: 136 MMOL/L (ref 133–144)
TRIGL SERPL-MCNC: 76 MG/DL

## 2022-01-03 PROCEDURE — 80053 COMPREHEN METABOLIC PANEL: CPT | Performed by: INTERNAL MEDICINE

## 2022-01-03 PROCEDURE — 36415 COLL VENOUS BLD VENIPUNCTURE: CPT | Performed by: INTERNAL MEDICINE

## 2022-01-03 PROCEDURE — 90715 TDAP VACCINE 7 YRS/> IM: CPT | Performed by: INTERNAL MEDICINE

## 2022-01-03 PROCEDURE — 90471 IMMUNIZATION ADMIN: CPT | Performed by: INTERNAL MEDICINE

## 2022-01-03 PROCEDURE — 80061 LIPID PANEL: CPT | Performed by: INTERNAL MEDICINE

## 2022-01-03 PROCEDURE — G0439 PPPS, SUBSEQ VISIT: HCPCS | Performed by: INTERNAL MEDICINE

## 2022-01-03 RX ORDER — CARVEDILOL 6.25 MG/1
6.25 TABLET ORAL 2 TIMES DAILY WITH MEALS
Qty: 180 TABLET | Refills: 3 | Status: SHIPPED | OUTPATIENT
Start: 2022-01-03 | End: 2023-02-23

## 2022-01-03 RX ORDER — CITALOPRAM HYDROBROMIDE 20 MG/1
20 TABLET ORAL DAILY
Qty: 90 TABLET | Refills: 3 | Status: SHIPPED | OUTPATIENT
Start: 2022-01-03 | End: 2023-03-09

## 2022-01-03 RX ORDER — ASPIRIN 81 MG/1
81 TABLET, CHEWABLE ORAL DAILY
Status: ON HOLD | COMMUNITY
End: 2022-02-18

## 2022-01-03 ASSESSMENT — MIFFLIN-ST. JEOR: SCORE: 1217.54

## 2022-01-03 ASSESSMENT — ACTIVITIES OF DAILY LIVING (ADL): CURRENT_FUNCTION: NO ASSISTANCE NEEDED

## 2022-01-03 NOTE — PROGRESS NOTES
"SUBJECTIVE:   Kandis Tse is a 76 year old female who presents for Preventive Visit.      Patient has been advised of split billing requirements and indicates understanding: Yes   Are you in the first 12 months of your Medicare coverage?  No    Healthy Habits:    In general, how would you rate your overall health?  Very good    Frequency of exercise:  2-3 days/week    Duration of exercise:  15-30 minutes    Do you usually eat at least 4 servings of fruit and vegetables a day, include whole grains    & fiber and avoid regularly eating high fat or \"junk\" foods?  No    Taking medications regularly:  Yes    Barriers to taking medications:  None    Medication side effects:  None    Ability to successfully perform activities of daily living:  No assistance needed    Home Safety:  No safety concerns identified    Hearing Impairment:  No hearing concerns    In the past 6 months, have you been bothered by leaking of urine?  No    In general, how would you rate your overall mental or emotional health?  Very good      PHQ-2 Total Score:    Additional concerns today:  Yes (CHRONIC COUGH)    Do you feel safe in your environment? Yes    Have you ever done Advance Care Planning? (For example, a Health Directive, POLST, or a discussion with a medical provider or your loved ones about your wishes): Yes, patient states has an Advance Care Planning document and will bring a copy to the clinic.       Fall risk  Fallen 2 or more times in the past year?: No  Any fall with injury in the past year?: No    Cognitive Screening   1) Repeat 3 items (Leader, Season, Table)    2) Clock draw: NORMAL  3) 3 item recall: Recalls 3 objects  Results: 3 items recalled: COGNITIVE IMPAIRMENT LESS LIKELY    Mini-CogTM Copyright HAVEN Ibrahim. Licensed by the author for use in [facility]; reprinted with permission (peter@.[facility]). All rights reserved.          Reviewed and updated as needed this visit by clinical staff  Tobacco  Allergies  "   Med Hx  Surg Hx  Fam Hx  Soc Hx       Reviewed and updated as needed this visit by Provider    Meds            Social History     Tobacco Use     Smoking status: Former Smoker     Smokeless tobacco: Never Used   Substance Use Topics     Alcohol use: No     If you drink alcohol do you typically have >3 drinks per day or >7 drinks per week? No    Alcohol Use 1/3/2022   Prescreen: >3 drinks/day or >7 drinks/week? No       Overall doing well. Recovered from hip surgery, feels like pain is minimal to non-existent. No longer taking naproxen or doing topical diclofenac.         Current providers sharing in care for this patient include:   Patient Care Team:  Yoko Conde MD as PCP - General (Internal Medicine)  Dante Becker MD as Assigned Musculoskeletal Provider  Lili Winter MD as Assigned PCP    The following health maintenance items are reviewed in Epic and correct as of today:  Health Maintenance Due   Topic Date Due     URINE DRUG SCREEN  Never done     ANNUAL REVIEW OF HM ORDERS  Never done     HEPATITIS C SCREENING  Never done     LUNG CANCER SCREENING  Never done     Pneumococcal Vaccine: 65+ Years (2 of 2 - PPSV23) 11/17/2017     DTAP/TDAP/TD IMMUNIZATION (2 - Td or Tdap) 05/11/2019     FALL RISK ASSESSMENT  07/13/2021     LIPID  08/05/2021     PHQ-2  01/01/2022     Patient Active Problem List   Diagnosis     Claw toe, acquired     Takotsubo cardiomyopathy     Anxiety     Osteopenia of multiple sites     Mood disorder (H)     Primary localized osteoarthritis of left hip     CAD (coronary artery disease)     History of CVA (cerebrovascular accident)     Past Surgical History:   Procedure Laterality Date     ARTHROPLASTY HIP Left 3/16/2021    Procedure: Left total hip arthroplasty (Peterson [employer] Nephew 52 mm acetabulum; #10 [employer] high offset stem; -2 neck 36 mm head);  Surgeon: Dante Becker MD;  Location: [facility] OR     BIOPSY BREAST Left Early 1990's     BREAST SURGERY      breast biopsy,  "breast lumpectomy     COSMETIC SURGERY      augmentation mammaplasty     GYN SURGERY      hysterectomy     HYSTERECTOMY  Early 1990's     IR THORACIC VERTEBROPLASTY  10/11/2016     LUMPECTOMY BREAST Left Early 1990's     MAMMOPLASTY AUGMENTATION Bilateral Late 1990's     ORTHOPEDIC SURGERY Bilateral     bunionectomy/10 toes repaired     OTHER SURGICAL HISTORY      lumpectomy     REPAIR HAMMER TOE Bilateral 4/18/2018    Procedure: REPAIR HAMMER TOE;  RIGHT SECOND, FOURTH, AND FIFTH  MALLET TOE RECONSTRUCTION WITH LEFT THIRD CLAWTOE RECONSTRUCTION (CHOICE)  ;  Surgeon: Edgar Carlisle MD;  Location: [facility] OR     WRIST SURGERY Left      ZZC TOTAL ABDOM HYSTERECTOMY      Description: Hysterectomy;  Recorded: 06/17/2013;       Social History     Tobacco Use     Smoking status: Former Smoker     Smokeless tobacco: Never Used   Substance Use Topics     Alcohol use: No     Family History   Problem Relation Age of Onset     Breast Cancer Mother         Post Menopausal     Dementia Father              Mammogram Screening - Patient over age 75, has elected to continue with screening.  Pertinent mammograms are reviewed under the imaging tab.    Review of Systems  Constitutional, HEENT, cardiovascular, pulmonary, GI, , musculoskeletal, neuro, skin, endocrine and psych systems are negative, except as otherwise noted.    OBJECTIVE:   /74 (BP Location: Right arm, Patient Position: Sitting, Cuff Size: Adult Small)   Pulse 72   Temp 97.8  F (36.6  C) (Tympanic)   Resp 16   Ht 1.702 m (5' 7\")   Wt 69.5 kg (153 lb 3.2 oz)   SpO2 98%   BMI 23.99 kg/m   Estimated body mass index is 23.99 kg/m  as calculated from the following:    Height as of this encounter: 1.702 m (5' 7\").    Weight as of this encounter: 69.5 kg (153 lb 3.2 oz).  Physical Exam  GENERAL: healthy, alert and no distress  EYES: Eyes grossly normal to inspection, PERRL and conjunctivae and sclerae normal  HENT: ear canals and TM's normal, nose and mouth " without ulcers or lesions  NECK: no adenopathy, no asymmetry, masses, or scars and thyroid normal to palpation  RESP: lungs clear to auscultation - no rales, rhonchi or wheezes  CV: regular rate and rhythm, normal S1 S2, no S3 or S4, no murmur, click or rub, no peripheral edema and peripheral pulses strong  ABDOMEN: soft, nontender, no hepatosplenomegaly, no masses and bowel sounds normal  MS: no gross musculoskeletal defects noted, no edema  SKIN: no suspicious lesions or rashes  NEURO: Normal strength and tone, mentation intact and speech normal  PSYCH: mentation appears normal, affect normal/bright    Diagnostic Test Results:  Labs reviewed in Epic    ASSESSMENT / PLAN:   1. Encounter for Medicare annual wellness exam  Counseling as below, continues to get mammograms. Due for Tdap, PPSV23  - Lipid panel reflex to direct LDL Fasting  - Comprehensive metabolic panel (BMP + Alb, Alk Phos, ALT, AST, Total. Bili, TP)    2. Screening for hyperlipidemia  - Lipid panel reflex to direct LDL Fasting    4. Takotsubo cardiomyopathy  Now resolved, doing well on beta blocker. Due for labs.   - carvedilol (COREG) 6.25 MG tablet; Take 1 tablet (6.25 mg) by mouth 2 times daily (with meals)  Dispense: 180 tablet; Refill: 3  - Lipid panel reflex to direct LDL Fasting  - Comprehensive metabolic panel (BMP + Alb, Alk Phos, ALT, AST, Total. Bili, TP)    5. Mood disorder (H)  Stable on current regimen, no issues or med side effects.   - citalopram (CELEXA) 20 MG tablet; Take 1 tablet (20 mg) by mouth daily  Dispense: 90 tablet; Refill: 3    6. History of CVA (cerebrovascular accident)  On baby aspirin, has not previously taken statin given distant hx and very elevated HDL.   - Lipid panel reflex to direct LDL Fasting  - Comprehensive metabolic panel (BMP + Alb, Alk Phos, ALT, AST, Total. Bili, TP)    7. Abnormal finding of blood chemistry, unspecified   - Lipid panel reflex to direct LDL Fasting      COUNSELING:  Reviewed preventive  "health counseling, as reflected in patient instructions       Regular exercise       Healthy diet/nutrition       Vision screening       Hearing screening    Estimated body mass index is 23.99 kg/m  as calculated from the following:    Height as of this encounter: 1.702 m (5' 7\").    Weight as of this encounter: 69.5 kg (153 lb 3.2 oz).        She reports that she has quit smoking. She has never used smokeless tobacco.      Appropriate preventive services were discussed with this patient, including applicable screening as appropriate for cardiovascular disease, diabetes, osteopenia/osteoporosis, and glaucoma.  As appropriate for age/gender, discussed screening for colorectal cancer, prostate cancer, breast cancer, and cervical cancer. Checklist reviewing preventive services available has been given to the patient.    Reviewed patients plan of care and provided an AVS. The Basic Care Plan (routine screening as documented in Health Maintenance) for Kandis meets the Care Plan requirement. This Care Plan has been established and reviewed with the Patient.    Counseling Resources:  ATP IV Guidelines  Pooled Cohorts Equation Calculator  Breast Cancer Risk Calculator  Breast Cancer: Medication to Reduce Risk  FRAX Risk Assessment  ICSI Preventive Guidelines  Dietary Guidelines for Americans, 2010  [employer]'s MyPlate  ASA Prophylaxis  Lung CA Screening    Yoko Lopez MD  [facility]    Identified Health Risks:  "

## 2022-01-03 NOTE — PATIENT INSTRUCTIONS
Medications refilled. Stay on the baby aspirin.     Labs today. Ask about pneumonia vaccine at your pharmacy.      Patient Education   Personalized Prevention Plan  You are due for the preventive services outlined below.  Your care team is available to assist you in scheduling these services.  If you have already completed any of these items, please share that information with your care team to update in your medical record.  Health Maintenance Due   Topic Date Due     URINE DRUG SCREEN  Never done     ANNUAL REVIEW OF HM ORDERS  Never done     Hepatitis C Screening  Never done     LUNG CANCER SCREENING  Never done     Pneumococcal Vaccine (2 of 2 - PPSV23) 11/17/2017     Diptheria Tetanus Pertussis (DTAP/TDAP/TD) Vaccine (2 - Td or Tdap) 05/11/2019     Annual Wellness Visit  07/13/2021     FALL RISK ASSESSMENT  07/13/2021     Cholesterol Lab  08/05/2021     PHQ-2  01/01/2022

## 2022-02-13 ENCOUNTER — HOSPITAL ENCOUNTER (INPATIENT)
Facility: CLINIC | Age: 77
LOS: 7 days | Discharge: HOME HEALTH | DRG: 481 | End: 2022-02-20
Attending: EMERGENCY MEDICINE | Admitting: INTERNAL MEDICINE
Payer: COMMERCIAL

## 2022-02-13 ENCOUNTER — APPOINTMENT (OUTPATIENT)
Dept: GENERAL RADIOLOGY | Facility: CLINIC | Age: 77
DRG: 481 | End: 2022-02-13
Attending: EMERGENCY MEDICINE
Payer: COMMERCIAL

## 2022-02-13 DIAGNOSIS — Z86.73 HISTORY OF CVA (CEREBROVASCULAR ACCIDENT): ICD-10-CM

## 2022-02-13 DIAGNOSIS — S72.141A CLOSED DISPLACED INTERTROCHANTERIC FRACTURE OF RIGHT FEMUR, INITIAL ENCOUNTER (H): Primary | ICD-10-CM

## 2022-02-13 DIAGNOSIS — G47.00 INSOMNIA, UNSPECIFIED TYPE: ICD-10-CM

## 2022-02-13 DIAGNOSIS — S72.002A CLOSED FRACTURE OF LEFT HIP, INITIAL ENCOUNTER (H): ICD-10-CM

## 2022-02-13 PROBLEM — S72.001A CLOSED RIGHT HIP FRACTURE (H): Status: ACTIVE | Noted: 2022-02-13

## 2022-02-13 LAB
ABO/RH(D): NORMAL
ANION GAP SERPL CALCULATED.3IONS-SCNC: 6 MMOL/L (ref 3–14)
ANTIBODY SCREEN: NEGATIVE
ATRIAL RATE - MUSE: 78 BPM
BASOPHILS # BLD AUTO: 0 10E3/UL (ref 0–0.2)
BASOPHILS NFR BLD AUTO: 0 %
BUN SERPL-MCNC: 20 MG/DL (ref 7–30)
CALCIUM SERPL-MCNC: 8.7 MG/DL (ref 8.5–10.1)
CHLORIDE BLD-SCNC: 106 MMOL/L (ref 94–109)
CO2 SERPL-SCNC: 28 MMOL/L (ref 20–32)
CREAT SERPL-MCNC: 0.69 MG/DL (ref 0.52–1.04)
DIASTOLIC BLOOD PRESSURE - MUSE: NORMAL MMHG
EOSINOPHIL # BLD AUTO: 0.2 10E3/UL (ref 0–0.7)
EOSINOPHIL NFR BLD AUTO: 4 %
ERYTHROCYTE [DISTWIDTH] IN BLOOD BY AUTOMATED COUNT: 12.5 % (ref 10–15)
GFR SERPL CREATININE-BSD FRML MDRD: 89 ML/MIN/1.73M2
GLUCOSE BLD-MCNC: 91 MG/DL (ref 70–99)
HCT VFR BLD AUTO: 39.7 % (ref 35–47)
HGB BLD-MCNC: 13 G/DL (ref 11.7–15.7)
IMM GRANULOCYTES # BLD: 0 10E3/UL
IMM GRANULOCYTES NFR BLD: 0 %
INTERPRETATION ECG - MUSE: NORMAL
LYMPHOCYTES # BLD AUTO: 1.7 10E3/UL (ref 0.8–5.3)
LYMPHOCYTES NFR BLD AUTO: 35 %
MCH RBC QN AUTO: 32.9 PG (ref 26.5–33)
MCHC RBC AUTO-ENTMCNC: 32.7 G/DL (ref 31.5–36.5)
MCV RBC AUTO: 101 FL (ref 78–100)
MONOCYTES # BLD AUTO: 0.6 10E3/UL (ref 0–1.3)
MONOCYTES NFR BLD AUTO: 13 %
NEUTROPHILS # BLD AUTO: 2.3 10E3/UL (ref 1.6–8.3)
NEUTROPHILS NFR BLD AUTO: 48 %
NRBC # BLD AUTO: 0 10E3/UL
NRBC BLD AUTO-RTO: 0 /100
P AXIS - MUSE: 77 DEGREES
PLATELET # BLD AUTO: 158 10E3/UL (ref 150–450)
POTASSIUM BLD-SCNC: 3.5 MMOL/L (ref 3.4–5.3)
PR INTERVAL - MUSE: 168 MS
QRS DURATION - MUSE: 66 MS
QT - MUSE: 398 MS
QTC - MUSE: 453 MS
R AXIS - MUSE: 19 DEGREES
RBC # BLD AUTO: 3.95 10E6/UL (ref 3.8–5.2)
SARS-COV-2 RNA RESP QL NAA+PROBE: NEGATIVE
SODIUM SERPL-SCNC: 140 MMOL/L (ref 133–144)
SPECIMEN EXPIRATION DATE: NORMAL
SYSTOLIC BLOOD PRESSURE - MUSE: NORMAL MMHG
T AXIS - MUSE: 59 DEGREES
VENTRICULAR RATE- MUSE: 78 BPM
WBC # BLD AUTO: 4.9 10E3/UL (ref 4–11)

## 2022-02-13 PROCEDURE — 96375 TX/PRO/DX INJ NEW DRUG ADDON: CPT

## 2022-02-13 PROCEDURE — 82374 ASSAY BLOOD CARBON DIOXIDE: CPT | Performed by: EMERGENCY MEDICINE

## 2022-02-13 PROCEDURE — 82310 ASSAY OF CALCIUM: CPT | Performed by: EMERGENCY MEDICINE

## 2022-02-13 PROCEDURE — 96376 TX/PRO/DX INJ SAME DRUG ADON: CPT

## 2022-02-13 PROCEDURE — 93005 ELECTROCARDIOGRAM TRACING: CPT

## 2022-02-13 PROCEDURE — 250N000011 HC RX IP 250 OP 636: Performed by: EMERGENCY MEDICINE

## 2022-02-13 PROCEDURE — 36415 COLL VENOUS BLD VENIPUNCTURE: CPT | Performed by: EMERGENCY MEDICINE

## 2022-02-13 PROCEDURE — 250N000009 HC RX 250: Performed by: EMERGENCY MEDICINE

## 2022-02-13 PROCEDURE — 120N000001 HC R&B MED SURG/OB

## 2022-02-13 PROCEDURE — 86901 BLOOD TYPING SEROLOGIC RH(D): CPT | Performed by: EMERGENCY MEDICINE

## 2022-02-13 PROCEDURE — 250N000013 HC RX MED GY IP 250 OP 250 PS 637: Performed by: INTERNAL MEDICINE

## 2022-02-13 PROCEDURE — C9803 HOPD COVID-19 SPEC COLLECT: HCPCS

## 2022-02-13 PROCEDURE — 99223 1ST HOSP IP/OBS HIGH 75: CPT | Mod: AI | Performed by: INTERNAL MEDICINE

## 2022-02-13 PROCEDURE — 87635 SARS-COV-2 COVID-19 AMP PRB: CPT | Performed by: EMERGENCY MEDICINE

## 2022-02-13 PROCEDURE — 73502 X-RAY EXAM HIP UNI 2-3 VIEWS: CPT

## 2022-02-13 PROCEDURE — 85025 COMPLETE CBC W/AUTO DIFF WBC: CPT | Performed by: EMERGENCY MEDICINE

## 2022-02-13 PROCEDURE — 96374 THER/PROPH/DIAG INJ IV PUSH: CPT

## 2022-02-13 PROCEDURE — 99285 EMERGENCY DEPT VISIT HI MDM: CPT | Mod: 25

## 2022-02-13 RX ORDER — NALOXONE HYDROCHLORIDE 0.4 MG/ML
0.4 INJECTION, SOLUTION INTRAMUSCULAR; INTRAVENOUS; SUBCUTANEOUS
Status: DISCONTINUED | OUTPATIENT
Start: 2022-02-13 | End: 2022-02-20 | Stop reason: HOSPADM

## 2022-02-13 RX ORDER — ACETAMINOPHEN 650 MG/1
650 SUPPOSITORY RECTAL EVERY 6 HOURS PRN
Status: DISCONTINUED | OUTPATIENT
Start: 2022-02-13 | End: 2022-02-20 | Stop reason: HOSPADM

## 2022-02-13 RX ORDER — AMOXICILLIN 250 MG
2 CAPSULE ORAL 2 TIMES DAILY PRN
Status: DISCONTINUED | OUTPATIENT
Start: 2022-02-13 | End: 2022-02-20 | Stop reason: HOSPADM

## 2022-02-13 RX ORDER — LANOLIN ALCOHOL/MO/W.PET/CERES
3 CREAM (GRAM) TOPICAL
Status: DISCONTINUED | OUTPATIENT
Start: 2022-02-13 | End: 2022-02-20 | Stop reason: HOSPADM

## 2022-02-13 RX ORDER — BISACODYL 10 MG
10 SUPPOSITORY, RECTAL RECTAL DAILY PRN
Status: DISCONTINUED | OUTPATIENT
Start: 2022-02-13 | End: 2022-02-14

## 2022-02-13 RX ORDER — KETAMINE HYDROCHLORIDE 10 MG/ML
5 INJECTION, SOLUTION INTRAMUSCULAR; INTRAVENOUS
Status: DISCONTINUED | OUTPATIENT
Start: 2022-02-13 | End: 2022-02-20 | Stop reason: HOSPADM

## 2022-02-13 RX ORDER — ONDANSETRON 2 MG/ML
4 INJECTION INTRAMUSCULAR; INTRAVENOUS ONCE
Status: COMPLETED | OUTPATIENT
Start: 2022-02-13 | End: 2022-02-13

## 2022-02-13 RX ORDER — HYDROMORPHONE HYDROCHLORIDE 1 MG/ML
0.5 INJECTION, SOLUTION INTRAMUSCULAR; INTRAVENOUS; SUBCUTANEOUS
Status: COMPLETED | OUTPATIENT
Start: 2022-02-13 | End: 2022-02-13

## 2022-02-13 RX ORDER — ACETAMINOPHEN 325 MG/1
650 TABLET ORAL EVERY 6 HOURS PRN
Status: DISCONTINUED | OUTPATIENT
Start: 2022-02-13 | End: 2022-02-14

## 2022-02-13 RX ORDER — ONDANSETRON 4 MG/1
4 TABLET, ORALLY DISINTEGRATING ORAL EVERY 6 HOURS PRN
Status: DISCONTINUED | OUTPATIENT
Start: 2022-02-13 | End: 2022-02-14

## 2022-02-13 RX ORDER — SODIUM CHLORIDE 9 MG/ML
INJECTION, SOLUTION INTRAVENOUS CONTINUOUS
Status: DISCONTINUED | OUTPATIENT
Start: 2022-02-14 | End: 2022-02-18

## 2022-02-13 RX ORDER — OXYCODONE HYDROCHLORIDE 5 MG/1
5-10 TABLET ORAL EVERY 4 HOURS PRN
Status: DISCONTINUED | OUTPATIENT
Start: 2022-02-13 | End: 2022-02-14

## 2022-02-13 RX ORDER — LIDOCAINE 40 MG/G
CREAM TOPICAL
Status: DISCONTINUED | OUTPATIENT
Start: 2022-02-13 | End: 2022-02-14

## 2022-02-13 RX ORDER — ONDANSETRON 2 MG/ML
4 INJECTION INTRAMUSCULAR; INTRAVENOUS EVERY 6 HOURS PRN
Status: DISCONTINUED | OUTPATIENT
Start: 2022-02-13 | End: 2022-02-14

## 2022-02-13 RX ORDER — AMOXICILLIN 250 MG
1 CAPSULE ORAL 2 TIMES DAILY PRN
Status: DISCONTINUED | OUTPATIENT
Start: 2022-02-13 | End: 2022-02-20 | Stop reason: HOSPADM

## 2022-02-13 RX ORDER — NAPROXEN SODIUM 220 MG
220 TABLET ORAL 2 TIMES DAILY PRN
Status: ON HOLD | COMMUNITY
End: 2022-02-18

## 2022-02-13 RX ORDER — HYDROMORPHONE HYDROCHLORIDE 1 MG/ML
.3-.5 INJECTION, SOLUTION INTRAMUSCULAR; INTRAVENOUS; SUBCUTANEOUS
Status: DISCONTINUED | OUTPATIENT
Start: 2022-02-13 | End: 2022-02-14

## 2022-02-13 RX ORDER — NALOXONE HYDROCHLORIDE 0.4 MG/ML
0.2 INJECTION, SOLUTION INTRAMUSCULAR; INTRAVENOUS; SUBCUTANEOUS
Status: DISCONTINUED | OUTPATIENT
Start: 2022-02-13 | End: 2022-02-20 | Stop reason: HOSPADM

## 2022-02-13 RX ADMIN — ONDANSETRON 4 MG: 2 INJECTION INTRAMUSCULAR; INTRAVENOUS at 19:23

## 2022-02-13 RX ADMIN — HYDROMORPHONE HYDROCHLORIDE 0.5 MG: 1 INJECTION, SOLUTION INTRAMUSCULAR; INTRAVENOUS; SUBCUTANEOUS at 16:58

## 2022-02-13 RX ADMIN — ACETAMINOPHEN 650 MG: 325 TABLET, FILM COATED ORAL at 22:32

## 2022-02-13 RX ADMIN — HYDROMORPHONE HYDROCHLORIDE 0.5 MG: 1 INJECTION, SOLUTION INTRAMUSCULAR; INTRAVENOUS; SUBCUTANEOUS at 17:26

## 2022-02-13 RX ADMIN — MELATONIN TAB 3 MG 3 MG: 3 TAB at 22:01

## 2022-02-13 RX ADMIN — KETAMINE HYDROCHLORIDE 5 MG: 10 INJECTION, SOLUTION INTRAMUSCULAR; INTRAVENOUS at 20:07

## 2022-02-13 RX ADMIN — HYDROMORPHONE HYDROCHLORIDE 0.5 MG: 1 INJECTION, SOLUTION INTRAMUSCULAR; INTRAVENOUS; SUBCUTANEOUS at 16:10

## 2022-02-13 ASSESSMENT — ACTIVITIES OF DAILY LIVING (ADL)
ADLS_ACUITY_SCORE: 4
ADLS_ACUITY_SCORE: 4
ADLS_ACUITY_SCORE: 7
ADLS_ACUITY_SCORE: 4
ADLS_ACUITY_SCORE: 7

## 2022-02-13 ASSESSMENT — ENCOUNTER SYMPTOMS
FEVER: 0
COUGH: 0
ARTHRALGIAS: 1
NUMBNESS: 0
SHORTNESS OF BREATH: 0
WEAKNESS: 0
CHILLS: 0
HEADACHES: 0

## 2022-02-13 ASSESSMENT — MIFFLIN-ST. JEOR: SCORE: 1173.54

## 2022-02-13 NOTE — ED TRIAGE NOTES
Pt was walking down her stairs at home when she missed a step and fell, she slid down the remainder of the stairs on her bottom but was unable to get up due to significant pain in right hip. EMS was called. Pt denies head injury. She was given Fentanyl 50mcg x 2 doses by EMS for pain.

## 2022-02-13 NOTE — PHARMACY-ADMISSION MEDICATION HISTORY
Pharmacy Medication History  Admission medication history interview status for the 2/13/2022  admission is complete. See EPIC admission navigator for prior to admission medications     Location of Interview: Patient room  Medication history sources: Patient and Surescripts    Significant changes made to the medication list:  Added aleve    In the past week, patient estimated taking medication this percent of the time: greater than 90%    Additional medication history information:   Pt took a few days of Doxycycline 100mg bid ordered 2/2/22. Pt was unable to tolerate 14 day regimen due to stomach issues    Medication reconciliation completed by provider prior to medication history? No    Time spent in this activity: 15 minutes    Prior to Admission medications    Medication Sig Last Dose Taking? Auth Provider   aspirin (ASA) 81 MG chewable tablet Take 81 mg by mouth daily 2/12/2022 at Unknown time Yes Reported, Patient   carvedilol (COREG) 6.25 MG tablet Take 1 tablet (6.25 mg) by mouth 2 times daily (with meals) 2/12/2022 at am Yes Yoko Conde MD   citalopram (CELEXA) 20 MG tablet Take 1 tablet (20 mg) by mouth daily 2/12/2022 at Unknown time Yes Yoko Conde MD   naproxen sodium (ANAPROX) 220 MG tablet Take 220 mg by mouth 2 times daily as needed for moderate pain Past Month at Unknown time Yes Unknown, Entered By History       The information provided in this note is only as accurate as the sources available at the time of update(s)

## 2022-02-13 NOTE — ED PROVIDER NOTES
History   Chief Complaint:  Fall       The history is provided by the patient.      Kandis Tse is a 76 year old female with history of osteoarthritis, CVA, and CAD who presents with fall. Earlier today, the patient went to walk down her stairs and missed one of the first steps and fell on her right hip. She then slid down the last couple of stairs. Following the fall, she was unable to bear weight because of the pain in her right hip. When EMS arrived, her pain was a 10/10 and she was given a dose of pain medication. While in the ED, her pain is a 7/10 She attributes her fall to wearing her reading glasses and having skewed depth perception. She denies hitting her head as well as any headache or other injuries.       Review of Systems   Constitutional: Negative for chills and fever.   Respiratory: Negative for cough and shortness of breath.    Cardiovascular: Negative for chest pain.   Musculoskeletal: Positive for arthralgias (right hip).   Neurological: Negative for weakness, numbness and headaches.   All other systems reviewed and are negative.      Allergies:  The patient has no known allergies.     Medications:  Aspirin 81 mg   Coreg  Celexa    Past Medical History:     Anxiety   Breast cancer  Radiation therapy   MI  Osteoarthritis  Osteopenia  Peripheral vertigo  PONV  Stress-induced cardiomyopathy   T12 compression fracture  Takotsubo cardiomyopathy   PASCUAL  CVA  CAD  Osteoarthritis of left hip      Past Surgical History:    Left hip arthroplasty   Breast lumpectomy  Augmentation mammaplasty   Hysterectomy  Thoracic Vertebroplasty   Bilateral bunionectomy, 10 toes repaired  Bilateral hammertoe repair  Left wrist surgery      Family History:    Mother: post menopausal breast cancer  Father: dementia     Social History:  The patient presents to the ED alone. She denies any alcohol use.       Physical Exam     Patient Vitals for the past 24 hrs:   BP Temp Temp src Pulse Resp SpO2 Height Weight  "  02/13/22 1745 (!) 144/75 -- -- -- -- 90 % -- --   02/13/22 1645 (!) 160/93 -- -- -- -- 96 % -- --   02/13/22 1536 (!) 162/88 98.2  F (36.8  C) Oral 79 16 98 % 1.727 m (5' 8\") 63.5 kg (140 lb)       Physical Exam  General: alert, lying comfortably on gurney  HENT: mucous membranes moist  Head: Nontender, no bruising.  CV: regular rate, regular rhythm  Resp: normal effort, clear throughout, no crackles or wheezing  GI: abdomen soft and nontender, no guarding  MSK: Tenderness and swelling over the greater trochanter, and medial thigh of the right lower extremity.  No tenderness to the right knee or ankle.  2+ DP pulse.  Fine touch sensation intact throughout the foot.  Foot warm, dorsiflexion plantarflexion EHL and FHL 5 out of 5.  Full range of motion in bilateral upper extremities and left lower extremity.  Skin: appropriately warm and dry  Extremities: no edema, calves non-tender  Neuro: alert, clear speech, oriented  Psych: normal mood and affect      Emergency Department Course   ECG  ECG obtained at 1638, ECG read at 1654  Sinus rhythm with occasional premature ventricular complexes. Septal infarct, age undetermined. Abnormal ECG.    No significant change as compared to prior, dated 4/3/2018.  Rate 78 bpm. RI interval 168 ms. QRS duration 66 ms. QT/QTc 398/453 ms. P-R-T axes 77 19 59.     Imaging:  XR Pelvis w Hip Right 1 View   Final Result   IMPRESSION: Comminuted intertrochanteric fracture of the right hip. Slight superolateral subluxation of the femoral shaft. No dislocation at the level of the hip joint. Postoperative changes of left total hip arthroplasty. There is some irregularity at    the junction of the left inferior pubic ramus and pubic body which is worrisome for fracture but this may represent a differing chronicity injury. Pelvis otherwise negative.        Report per radiology    Laboratory:  Labs Ordered and Resulted from Time of ED Arrival to Time of ED Departure   CBC WITH PLATELETS AND " DIFFERENTIAL - Abnormal       Result Value    WBC Count 4.9      RBC Count 3.95      Hemoglobin 13.0      Hematocrit 39.7       (*)     MCH 32.9      MCHC 32.7      RDW 12.5      Platelet Count 158      % Neutrophils 48      % Lymphocytes 35      % Monocytes 13      % Eosinophils 4      % Basophils 0      % Immature Granulocytes 0      NRBCs per 100 WBC 0      Absolute Neutrophils 2.3      Absolute Lymphocytes 1.7      Absolute Monocytes 0.6      Absolute Eosinophils 0.2      Absolute Basophils 0.0      Absolute Immature Granulocytes 0.0      Absolute NRBCs 0.0     BASIC METABOLIC PANEL - Normal    Sodium 140      Potassium 3.5      Chloride 106      Carbon Dioxide (CO2) 28      Anion Gap 6      Urea Nitrogen 20      Creatinine 0.69      Calcium 8.7      Glucose 91      GFR Estimate 89     COVID-19 VIRUS (CORONAVIRUS) BY PCR   TYPE AND SCREEN, ADULT    ABO/RH(D) A POS      Antibody Screen Negative      SPECIMEN EXPIRATION DATE [id number]     ABO/RH TYPE AND SCREEN          Emergency Department Course:    Reviewed:  I reviewed nursing notes, vitals, past medical history, Care Everywhere and [facility]    Assessments:  1545 I obtained history and examined the patient as noted above.   1800 I rechecked the patient and explained findings.     1902 I spoke with the patient's son to explain the situation.     Consults:  1800 I spoke with Dr. Carcamo, who is on-call for [facility] orthopedics.  No plan for surgery tonight.  Patient to be n.p.o. after midnight, likely surgical fixation in the morning.  1820 I spoke with Dr. Mortensen, hospitalist, who agreed to accept the patient.     Interventions:  1610 Dilaudid 0.5 mg IV  1658 Dilaudid 0.5 mg IV    1726 Dilaudid 1.5 mg IV  1923 Zofran 4 mg IV    2007 Ketamine 5 mg IV    Disposition:  The patient was admitted to the hospital under the care of Dr. Mortensen.     Impression & Plan     CMS Diagnoses: None    Medical Decision Making:  Kandis Tse is a 76 year old  female who presents for evaluation of right pain after fall. CMS is intact distally in the extremity.  Xrays reveal a fracture of the hip that will need orthopedic consultation and likely surgery.  The patients head to toe trauma exam is otherwise normal at this time and no further trauma workup is needed as I believe there is no signs of serious head, neck, chest, spinal, extremity or abdominal injuries.   Will admit to medicine for further cares and ortho consultation.  Pain control partially achieved.    Diagnosis:    ICD-10-CM    1. Closed fracture of left hip, initial encounter (H)  S72.002A          Scribe Disclosure:  I, Deena Flood, am serving as a scribe at 3:41 PM on 2/13/2022 to document services personally performed by Rachell Mcghee MD based on my observations and the provider's statements to me.              Rachell Mcghee MD  02/13/22 2120

## 2022-02-13 NOTE — ED NOTES
Bed: ED08  Expected date:   Expected time:   Means of arrival:   Comments:  [facility] 76f fall hip fx

## 2022-02-13 NOTE — ED NOTES
Pt was given a total of 100mcg Fentanyl by EMS. They gave one 50mcg dose upon arrival to scene and another 50mcg enroute to hospital.

## 2022-02-13 NOTE — ED NOTES
"[facility]  ED Nurse Handoff Report    ED Chief complaint: Fall      ED Diagnosis:   Final diagnoses:   None       Code Status: Full Code    Allergies: No Known Allergies    Patient Story: fall  Focused Assessment:    Kandis Tse is a 76 year old female with history of osteoarthritis, CVA, and CAD who presents with fall. Earlier today, the patient went to walk down her stairs and missed one of the first steps and fell on her right hip. She then slid down the last couple of stairs. Following the fall, she was unable to bear weight because of the pain in her right hip. When EMS arrived, her pain was a 10/10 and she was given a dose of pain medication. While in the ED, her pain is a 7/10 She attributes her fall to wearing her reading glasses and having skewed depth perception. She denies hitting her head as well as any headache or other injuries.      Treatments and/or interventions provided: labs, xrays, pain meds  Patient's response to treatments and/or interventions: pt is resting comfortably in bed    To be done/followed up on inpatient unit:  TBD    Does this patient have any cognitive concerns?: a and o x 4    Activity level - Baseline/Home:  Independent  Activity Level - Current:   Total Care    Patient's Preferred language: English   Needed?: No    Isolation: None  Infection: Not Applicable  Patient tested for COVID 19 prior to admission: YES  Bariatric?: No    Vital Signs:   Vitals:    02/13/22 1536 02/13/22 1645   BP: (!) 162/88 (!) 160/93   Pulse: 79    Resp: 16    Temp: 98.2  F (36.8  C)    TempSrc: Oral    SpO2: 98% 96%   Weight: 63.5 kg (140 lb)    Height: 1.727 m (5' 8\")        Cardiac Rhythm:     Was the PSS-3 completed:   Yes  What interventions are required if any?               Family Comments: na  OBS brochure/video discussed/provided to patient/family: No              Name of person given brochure if not patient: na              Relationship to patient: na    For " the majority of the shift this patient's behavior was Green.   Behavioral interventions performed were na.    ED NURSE PHONE NUMBER: *[phone]

## 2022-02-14 ENCOUNTER — ANESTHESIA (OUTPATIENT)
Dept: SURGERY | Facility: CLINIC | Age: 77
DRG: 481 | End: 2022-02-14
Payer: COMMERCIAL

## 2022-02-14 ENCOUNTER — APPOINTMENT (OUTPATIENT)
Dept: GENERAL RADIOLOGY | Facility: CLINIC | Age: 77
DRG: 481 | End: 2022-02-14
Attending: ORTHOPAEDIC SURGERY
Payer: COMMERCIAL

## 2022-02-14 ENCOUNTER — ANESTHESIA EVENT (OUTPATIENT)
Dept: SURGERY | Facility: CLINIC | Age: 77
DRG: 481 | End: 2022-02-14
Payer: COMMERCIAL

## 2022-02-14 LAB
ANION GAP SERPL CALCULATED.3IONS-SCNC: 6 MMOL/L (ref 3–14)
BUN SERPL-MCNC: 21 MG/DL (ref 7–30)
CALCIUM SERPL-MCNC: 8.4 MG/DL (ref 8.5–10.1)
CHLORIDE BLD-SCNC: 106 MMOL/L (ref 94–109)
CO2 SERPL-SCNC: 26 MMOL/L (ref 20–32)
CREAT SERPL-MCNC: 0.57 MG/DL (ref 0.52–1.04)
ERYTHROCYTE [DISTWIDTH] IN BLOOD BY AUTOMATED COUNT: 12.7 % (ref 10–15)
GFR SERPL CREATININE-BSD FRML MDRD: >90 ML/MIN/1.73M2
GLUCOSE BLD-MCNC: 97 MG/DL (ref 70–99)
HCT VFR BLD AUTO: 35.2 % (ref 35–47)
HGB BLD-MCNC: 11.5 G/DL (ref 11.7–15.7)
MCH RBC QN AUTO: 33.4 PG (ref 26.5–33)
MCHC RBC AUTO-ENTMCNC: 32.7 G/DL (ref 31.5–36.5)
MCV RBC AUTO: 102 FL (ref 78–100)
PLATELET # BLD AUTO: 138 10E3/UL (ref 150–450)
POTASSIUM BLD-SCNC: 3.6 MMOL/L (ref 3.4–5.3)
RBC # BLD AUTO: 3.44 10E6/UL (ref 3.8–5.2)
SODIUM SERPL-SCNC: 138 MMOL/L (ref 133–144)
WBC # BLD AUTO: 5.9 10E3/UL (ref 4–11)

## 2022-02-14 PROCEDURE — 120N000001 HC R&B MED SURG/OB

## 2022-02-14 PROCEDURE — 250N000009 HC RX 250: Performed by: PHYSICIAN ASSISTANT

## 2022-02-14 PROCEDURE — 370N000017 HC ANESTHESIA TECHNICAL FEE, PER MIN: Performed by: ORTHOPAEDIC SURGERY

## 2022-02-14 PROCEDURE — 272N000001 HC OR GENERAL SUPPLY STERILE: Performed by: ORTHOPAEDIC SURGERY

## 2022-02-14 PROCEDURE — 710N000009 HC RECOVERY PHASE 1, LEVEL 1, PER MIN: Performed by: ORTHOPAEDIC SURGERY

## 2022-02-14 PROCEDURE — 250N000011 HC RX IP 250 OP 636: Performed by: NURSE ANESTHETIST, CERTIFIED REGISTERED

## 2022-02-14 PROCEDURE — 99232 SBSQ HOSP IP/OBS MODERATE 35: CPT | Performed by: INTERNAL MEDICINE

## 2022-02-14 PROCEDURE — 258N000003 HC RX IP 258 OP 636: Performed by: ORTHOPAEDIC SURGERY

## 2022-02-14 PROCEDURE — 258N000003 HC RX IP 258 OP 636: Performed by: ANESTHESIOLOGY

## 2022-02-14 PROCEDURE — 999N000179 XR SURGERY CARM FLUORO LESS THAN 5 MIN W STILLS

## 2022-02-14 PROCEDURE — 250N000009 HC RX 250: Performed by: ORTHOPAEDIC SURGERY

## 2022-02-14 PROCEDURE — 250N000009 HC RX 250: Performed by: NURSE ANESTHETIST, CERTIFIED REGISTERED

## 2022-02-14 PROCEDURE — 250N000013 HC RX MED GY IP 250 OP 250 PS 637: Performed by: INTERNAL MEDICINE

## 2022-02-14 PROCEDURE — 999N000141 HC STATISTIC PRE-PROCEDURE NURSING ASSESSMENT: Performed by: ORTHOPAEDIC SURGERY

## 2022-02-14 PROCEDURE — 360N000084 HC SURGERY LEVEL 4 W/ FLUORO, PER MIN: Performed by: ORTHOPAEDIC SURGERY

## 2022-02-14 PROCEDURE — 82310 ASSAY OF CALCIUM: CPT | Performed by: INTERNAL MEDICINE

## 2022-02-14 PROCEDURE — 85027 COMPLETE CBC AUTOMATED: CPT | Performed by: INTERNAL MEDICINE

## 2022-02-14 PROCEDURE — 250N000011 HC RX IP 250 OP 636: Performed by: INTERNAL MEDICINE

## 2022-02-14 PROCEDURE — C1713 ANCHOR/SCREW BN/BN,TIS/BN: HCPCS | Performed by: ORTHOPAEDIC SURGERY

## 2022-02-14 PROCEDURE — 250N000011 HC RX IP 250 OP 636: Performed by: PHYSICIAN ASSISTANT

## 2022-02-14 PROCEDURE — 250N000013 HC RX MED GY IP 250 OP 250 PS 637: Performed by: ANESTHESIOLOGY

## 2022-02-14 PROCEDURE — 258N000003 HC RX IP 258 OP 636: Performed by: NURSE ANESTHETIST, CERTIFIED REGISTERED

## 2022-02-14 PROCEDURE — 250N000011 HC RX IP 250 OP 636: Performed by: ORTHOPAEDIC SURGERY

## 2022-02-14 PROCEDURE — 0QS604Z REPOSITION RIGHT UPPER FEMUR WITH INTERNAL FIXATION DEVICE, OPEN APPROACH: ICD-10-PCS | Performed by: ORTHOPAEDIC SURGERY

## 2022-02-14 PROCEDURE — 250N000013 HC RX MED GY IP 250 OP 250 PS 637: Performed by: ORTHOPAEDIC SURGERY

## 2022-02-14 PROCEDURE — 82374 ASSAY BLOOD CARBON DIOXIDE: CPT | Performed by: INTERNAL MEDICINE

## 2022-02-14 PROCEDURE — 250N000012 HC RX MED GY IP 250 OP 636 PS 637: Performed by: ANESTHESIOLOGY

## 2022-02-14 PROCEDURE — 258N000003 HC RX IP 258 OP 636: Performed by: INTERNAL MEDICINE

## 2022-02-14 PROCEDURE — 36415 COLL VENOUS BLD VENIPUNCTURE: CPT | Performed by: INTERNAL MEDICINE

## 2022-02-14 DEVICE — IMPLANTABLE DEVICE: Type: IMPLANTABLE DEVICE | Site: HIP | Status: FUNCTIONAL

## 2022-02-14 RX ORDER — ONDANSETRON 2 MG/ML
4 INJECTION INTRAMUSCULAR; INTRAVENOUS EVERY 6 HOURS PRN
Status: DISCONTINUED | OUTPATIENT
Start: 2022-02-14 | End: 2022-02-20 | Stop reason: HOSPADM

## 2022-02-14 RX ORDER — ACETAMINOPHEN 325 MG/1
650 TABLET ORAL EVERY 4 HOURS PRN
Status: DISCONTINUED | OUTPATIENT
Start: 2022-02-17 | End: 2022-02-20 | Stop reason: HOSPADM

## 2022-02-14 RX ORDER — OXYCODONE HYDROCHLORIDE 5 MG/1
5 TABLET ORAL EVERY 4 HOURS PRN
Status: DISCONTINUED | OUTPATIENT
Start: 2022-02-14 | End: 2022-02-20 | Stop reason: HOSPADM

## 2022-02-14 RX ORDER — SODIUM CHLORIDE, SODIUM LACTATE, POTASSIUM CHLORIDE, CALCIUM CHLORIDE 600; 310; 30; 20 MG/100ML; MG/100ML; MG/100ML; MG/100ML
INJECTION, SOLUTION INTRAVENOUS CONTINUOUS
Status: DISCONTINUED | OUTPATIENT
Start: 2022-02-14 | End: 2022-02-14 | Stop reason: HOSPADM

## 2022-02-14 RX ORDER — CEFAZOLIN SODIUM/WATER 2 G/20 ML
2 SYRINGE (ML) INTRAVENOUS
Status: COMPLETED | OUTPATIENT
Start: 2022-02-14 | End: 2022-02-14

## 2022-02-14 RX ORDER — HYDROMORPHONE HCL IN WATER/PF 6 MG/30 ML
0.2 PATIENT CONTROLLED ANALGESIA SYRINGE INTRAVENOUS
Status: DISCONTINUED | OUTPATIENT
Start: 2022-02-14 | End: 2022-02-20 | Stop reason: HOSPADM

## 2022-02-14 RX ORDER — CELECOXIB 100 MG/1
100 CAPSULE ORAL 2 TIMES DAILY
Status: COMPLETED | OUTPATIENT
Start: 2022-02-14 | End: 2022-02-16

## 2022-02-14 RX ORDER — SODIUM CHLORIDE, SODIUM LACTATE, POTASSIUM CHLORIDE, CALCIUM CHLORIDE 600; 310; 30; 20 MG/100ML; MG/100ML; MG/100ML; MG/100ML
INJECTION, SOLUTION INTRAVENOUS CONTINUOUS
Status: DISCONTINUED | OUTPATIENT
Start: 2022-02-14 | End: 2022-02-15

## 2022-02-14 RX ORDER — CEFAZOLIN SODIUM 1 G/3ML
1 INJECTION, POWDER, FOR SOLUTION INTRAMUSCULAR; INTRAVENOUS EVERY 8 HOURS
Status: COMPLETED | OUTPATIENT
Start: 2022-02-14 | End: 2022-02-15

## 2022-02-14 RX ORDER — BISACODYL 10 MG
10 SUPPOSITORY, RECTAL RECTAL DAILY PRN
Status: DISCONTINUED | OUTPATIENT
Start: 2022-02-14 | End: 2022-02-20 | Stop reason: HOSPADM

## 2022-02-14 RX ORDER — APREPITANT 40 MG/1
40 CAPSULE ORAL DAILY
Status: DISCONTINUED | OUTPATIENT
Start: 2022-02-14 | End: 2022-02-18

## 2022-02-14 RX ORDER — HYDROMORPHONE HCL IN WATER/PF 6 MG/30 ML
0.4 PATIENT CONTROLLED ANALGESIA SYRINGE INTRAVENOUS EVERY 5 MIN PRN
Status: DISCONTINUED | OUTPATIENT
Start: 2022-02-14 | End: 2022-02-14 | Stop reason: HOSPADM

## 2022-02-14 RX ORDER — ONDANSETRON 2 MG/ML
4 INJECTION INTRAMUSCULAR; INTRAVENOUS EVERY 30 MIN PRN
Status: DISCONTINUED | OUTPATIENT
Start: 2022-02-14 | End: 2022-02-14 | Stop reason: HOSPADM

## 2022-02-14 RX ORDER — POLYETHYLENE GLYCOL 3350 17 G/17G
17 POWDER, FOR SOLUTION ORAL DAILY
Status: DISCONTINUED | OUTPATIENT
Start: 2022-02-15 | End: 2022-02-20 | Stop reason: HOSPADM

## 2022-02-14 RX ORDER — TRANEXAMIC ACID 10 MG/ML
1 INJECTION, SOLUTION INTRAVENOUS ONCE
Status: DISCONTINUED | OUTPATIENT
Start: 2022-02-14 | End: 2022-02-14

## 2022-02-14 RX ORDER — PROCHLORPERAZINE MALEATE 5 MG
5 TABLET ORAL EVERY 6 HOURS PRN
Status: DISCONTINUED | OUTPATIENT
Start: 2022-02-14 | End: 2022-02-20 | Stop reason: HOSPADM

## 2022-02-14 RX ORDER — HYDROMORPHONE HCL IN WATER/PF 6 MG/30 ML
0.4 PATIENT CONTROLLED ANALGESIA SYRINGE INTRAVENOUS
Status: DISCONTINUED | OUTPATIENT
Start: 2022-02-14 | End: 2022-02-20 | Stop reason: HOSPADM

## 2022-02-14 RX ORDER — PROPOFOL 10 MG/ML
INJECTION, EMULSION INTRAVENOUS PRN
Status: DISCONTINUED | OUTPATIENT
Start: 2022-02-14 | End: 2022-02-14

## 2022-02-14 RX ORDER — LIDOCAINE HYDROCHLORIDE 20 MG/ML
INJECTION, SOLUTION INFILTRATION; PERINEURAL PRN
Status: DISCONTINUED | OUTPATIENT
Start: 2022-02-14 | End: 2022-02-14

## 2022-02-14 RX ORDER — MAGNESIUM HYDROXIDE 1200 MG/15ML
LIQUID ORAL PRN
Status: DISCONTINUED | OUTPATIENT
Start: 2022-02-14 | End: 2022-02-14 | Stop reason: HOSPADM

## 2022-02-14 RX ORDER — ACETAMINOPHEN 325 MG/1
975 TABLET ORAL EVERY 8 HOURS
Status: COMPLETED | OUTPATIENT
Start: 2022-02-14 | End: 2022-02-17

## 2022-02-14 RX ORDER — TRANEXAMIC ACID 10 MG/ML
1 INJECTION, SOLUTION INTRAVENOUS ONCE
Status: COMPLETED | OUTPATIENT
Start: 2022-02-14 | End: 2022-02-14

## 2022-02-14 RX ORDER — FAMOTIDINE 20 MG/1
20 TABLET, FILM COATED ORAL 2 TIMES DAILY
Status: DISCONTINUED | OUTPATIENT
Start: 2022-02-14 | End: 2022-02-20 | Stop reason: HOSPADM

## 2022-02-14 RX ORDER — FENTANYL CITRATE 0.05 MG/ML
50 INJECTION, SOLUTION INTRAMUSCULAR; INTRAVENOUS EVERY 5 MIN PRN
Status: DISCONTINUED | OUTPATIENT
Start: 2022-02-14 | End: 2022-02-14 | Stop reason: HOSPADM

## 2022-02-14 RX ORDER — ASPIRIN 81 MG/1
81 TABLET, CHEWABLE ORAL DAILY
Status: DISCONTINUED | OUTPATIENT
Start: 2022-02-14 | End: 2022-02-20 | Stop reason: HOSPADM

## 2022-02-14 RX ORDER — HYDROXYZINE HYDROCHLORIDE 10 MG/1
10 TABLET, FILM COATED ORAL EVERY 6 HOURS PRN
Status: DISCONTINUED | OUTPATIENT
Start: 2022-02-14 | End: 2022-02-20 | Stop reason: HOSPADM

## 2022-02-14 RX ORDER — CITALOPRAM HYDROBROMIDE 20 MG/1
20 TABLET ORAL DAILY
Status: DISCONTINUED | OUTPATIENT
Start: 2022-02-14 | End: 2022-02-20 | Stop reason: HOSPADM

## 2022-02-14 RX ORDER — DEXAMETHASONE SODIUM PHOSPHATE 4 MG/ML
INJECTION, SOLUTION INTRA-ARTICULAR; INTRALESIONAL; INTRAMUSCULAR; INTRAVENOUS; SOFT TISSUE PRN
Status: DISCONTINUED | OUTPATIENT
Start: 2022-02-14 | End: 2022-02-14

## 2022-02-14 RX ORDER — PROPOFOL 10 MG/ML
INJECTION, EMULSION INTRAVENOUS CONTINUOUS PRN
Status: DISCONTINUED | OUTPATIENT
Start: 2022-02-14 | End: 2022-02-14

## 2022-02-14 RX ORDER — ONDANSETRON 2 MG/ML
INJECTION INTRAMUSCULAR; INTRAVENOUS PRN
Status: DISCONTINUED | OUTPATIENT
Start: 2022-02-14 | End: 2022-02-14

## 2022-02-14 RX ORDER — LIDOCAINE 40 MG/G
CREAM TOPICAL
Status: DISCONTINUED | OUTPATIENT
Start: 2022-02-14 | End: 2022-02-20 | Stop reason: HOSPADM

## 2022-02-14 RX ORDER — SODIUM CHLORIDE, SODIUM LACTATE, POTASSIUM CHLORIDE, CALCIUM CHLORIDE 600; 310; 30; 20 MG/100ML; MG/100ML; MG/100ML; MG/100ML
INJECTION, SOLUTION INTRAVENOUS CONTINUOUS
Status: DISCONTINUED | OUTPATIENT
Start: 2022-02-14 | End: 2022-02-14

## 2022-02-14 RX ORDER — OXYCODONE HYDROCHLORIDE 5 MG/1
10 TABLET ORAL EVERY 4 HOURS PRN
Status: DISCONTINUED | OUTPATIENT
Start: 2022-02-14 | End: 2022-02-20 | Stop reason: HOSPADM

## 2022-02-14 RX ORDER — OXYCODONE HYDROCHLORIDE 5 MG/1
5 TABLET ORAL EVERY 4 HOURS PRN
Status: DISCONTINUED | OUTPATIENT
Start: 2022-02-14 | End: 2022-02-14 | Stop reason: HOSPADM

## 2022-02-14 RX ORDER — FENTANYL CITRATE 50 UG/ML
INJECTION, SOLUTION INTRAMUSCULAR; INTRAVENOUS PRN
Status: DISCONTINUED | OUTPATIENT
Start: 2022-02-14 | End: 2022-02-14

## 2022-02-14 RX ORDER — ACETAMINOPHEN 500 MG
1000 TABLET ORAL ONCE
Status: DISCONTINUED | OUTPATIENT
Start: 2022-02-14 | End: 2022-02-14

## 2022-02-14 RX ORDER — CARVEDILOL 3.12 MG/1
6.25 TABLET ORAL 2 TIMES DAILY WITH MEALS
Status: DISCONTINUED | OUTPATIENT
Start: 2022-02-14 | End: 2022-02-20 | Stop reason: HOSPADM

## 2022-02-14 RX ORDER — ONDANSETRON 4 MG/1
4 TABLET, ORALLY DISINTEGRATING ORAL EVERY 30 MIN PRN
Status: DISCONTINUED | OUTPATIENT
Start: 2022-02-14 | End: 2022-02-14 | Stop reason: HOSPADM

## 2022-02-14 RX ORDER — ONDANSETRON 4 MG/1
4 TABLET, ORALLY DISINTEGRATING ORAL EVERY 6 HOURS PRN
Status: DISCONTINUED | OUTPATIENT
Start: 2022-02-14 | End: 2022-02-20 | Stop reason: HOSPADM

## 2022-02-14 RX ORDER — AMOXICILLIN 250 MG
1 CAPSULE ORAL 2 TIMES DAILY
Status: DISCONTINUED | OUTPATIENT
Start: 2022-02-14 | End: 2022-02-20 | Stop reason: HOSPADM

## 2022-02-14 RX ORDER — CEFAZOLIN SODIUM/WATER 2 G/20 ML
2 SYRINGE (ML) INTRAVENOUS SEE ADMIN INSTRUCTIONS
Status: DISCONTINUED | OUTPATIENT
Start: 2022-02-14 | End: 2022-02-14

## 2022-02-14 RX ADMIN — SODIUM CHLORIDE, POTASSIUM CHLORIDE, SODIUM LACTATE AND CALCIUM CHLORIDE: 600; 310; 30; 20 INJECTION, SOLUTION INTRAVENOUS at 12:18

## 2022-02-14 RX ADMIN — PHENYLEPHRINE HYDROCHLORIDE 50 MCG: 10 INJECTION INTRAVENOUS at 13:49

## 2022-02-14 RX ADMIN — OXYCODONE HYDROCHLORIDE 5 MG: 5 TABLET ORAL at 04:21

## 2022-02-14 RX ADMIN — PHENYLEPHRINE HYDROCHLORIDE 100 MCG: 10 INJECTION INTRAVENOUS at 12:57

## 2022-02-14 RX ADMIN — CELECOXIB 100 MG: 100 CAPSULE ORAL at 20:28

## 2022-02-14 RX ADMIN — Medication 12 MCG: at 13:21

## 2022-02-14 RX ADMIN — MIDAZOLAM 2 MG: 1 INJECTION INTRAMUSCULAR; INTRAVENOUS at 13:26

## 2022-02-14 RX ADMIN — HYDROMORPHONE HYDROCHLORIDE 0.5 MG: 1 INJECTION, SOLUTION INTRAMUSCULAR; INTRAVENOUS; SUBCUTANEOUS at 05:23

## 2022-02-14 RX ADMIN — ACETAMINOPHEN 1000 MG: 325 TABLET, FILM COATED ORAL at 12:11

## 2022-02-14 RX ADMIN — CITALOPRAM HYDROBROMIDE 20 MG: 20 TABLET ORAL at 17:46

## 2022-02-14 RX ADMIN — SODIUM CHLORIDE, POTASSIUM CHLORIDE, SODIUM LACTATE AND CALCIUM CHLORIDE: 600; 310; 30; 20 INJECTION, SOLUTION INTRAVENOUS at 16:43

## 2022-02-14 RX ADMIN — OXYCODONE HYDROCHLORIDE 5 MG: 5 TABLET ORAL at 00:08

## 2022-02-14 RX ADMIN — ACETAMINOPHEN 650 MG: 325 TABLET, FILM COATED ORAL at 06:24

## 2022-02-14 RX ADMIN — PHENYLEPHRINE HYDROCHLORIDE 100 MCG: 10 INJECTION INTRAVENOUS at 13:51

## 2022-02-14 RX ADMIN — PROPOFOL 150 MCG/KG/MIN: 10 INJECTION, EMULSION INTRAVENOUS at 12:54

## 2022-02-14 RX ADMIN — OXYCODONE HYDROCHLORIDE 5 MG: 5 TABLET ORAL at 20:27

## 2022-02-14 RX ADMIN — PROPOFOL 60 MG: 10 INJECTION, EMULSION INTRAVENOUS at 13:23

## 2022-02-14 RX ADMIN — ASPIRIN 81 MG CHEWABLE TABLET 81 MG: 81 TABLET CHEWABLE at 17:46

## 2022-02-14 RX ADMIN — FAMOTIDINE 20 MG: 20 TABLET ORAL at 20:28

## 2022-02-14 RX ADMIN — SENNOSIDES AND DOCUSATE SODIUM 1 TABLET: 50; 8.6 TABLET ORAL at 20:27

## 2022-02-14 RX ADMIN — CARVEDILOL 6.25 MG: 3.12 TABLET, FILM COATED ORAL at 17:45

## 2022-02-14 RX ADMIN — FENTANYL CITRATE 50 MCG: 50 INJECTION, SOLUTION INTRAMUSCULAR; INTRAVENOUS at 12:52

## 2022-02-14 RX ADMIN — FENTANYL CITRATE 50 MCG: 50 INJECTION, SOLUTION INTRAMUSCULAR; INTRAVENOUS at 13:17

## 2022-02-14 RX ADMIN — PROPOFOL 140 MG: 10 INJECTION, EMULSION INTRAVENOUS at 12:52

## 2022-02-14 RX ADMIN — CEFAZOLIN 1 G: 1 INJECTION, POWDER, FOR SOLUTION INTRAMUSCULAR; INTRAVENOUS at 20:28

## 2022-02-14 RX ADMIN — LIDOCAINE HYDROCHLORIDE 80 MG: 20 INJECTION, SOLUTION INFILTRATION; PERINEURAL at 12:52

## 2022-02-14 RX ADMIN — SODIUM CHLORIDE: 9 INJECTION, SOLUTION INTRAVENOUS at 00:09

## 2022-02-14 RX ADMIN — APREPITANT 40 MG: 40 CAPSULE ORAL at 12:07

## 2022-02-14 RX ADMIN — HYDROMORPHONE HYDROCHLORIDE 0.2 MG: 0.2 INJECTION, SOLUTION INTRAMUSCULAR; INTRAVENOUS; SUBCUTANEOUS at 23:11

## 2022-02-14 RX ADMIN — ONDANSETRON 4 MG: 2 INJECTION INTRAMUSCULAR; INTRAVENOUS at 13:45

## 2022-02-14 RX ADMIN — HYDROMORPHONE HYDROCHLORIDE 0.5 MG: 1 INJECTION, SOLUTION INTRAMUSCULAR; INTRAVENOUS; SUBCUTANEOUS at 08:37

## 2022-02-14 RX ADMIN — PHENYLEPHRINE HYDROCHLORIDE 100 MCG: 10 INJECTION INTRAVENOUS at 13:30

## 2022-02-14 RX ADMIN — PHENYLEPHRINE HYDROCHLORIDE 150 MCG: 10 INJECTION INTRAVENOUS at 13:33

## 2022-02-14 RX ADMIN — Medication 2 G: at 12:56

## 2022-02-14 RX ADMIN — Medication 8 MCG: at 13:22

## 2022-02-14 RX ADMIN — TRANEXAMIC ACID 1 G: 10 INJECTION, SOLUTION INTRAVENOUS at 13:05

## 2022-02-14 RX ADMIN — PHENYLEPHRINE HYDROCHLORIDE 100 MCG: 10 INJECTION INTRAVENOUS at 13:42

## 2022-02-14 RX ADMIN — DEXAMETHASONE SODIUM PHOSPHATE 4 MG: 4 INJECTION, SOLUTION INTRA-ARTICULAR; INTRALESIONAL; INTRAMUSCULAR; INTRAVENOUS; SOFT TISSUE at 13:14

## 2022-02-14 RX ADMIN — ACETAMINOPHEN 975 MG: 325 TABLET, FILM COATED ORAL at 20:27

## 2022-02-14 ASSESSMENT — ACTIVITIES OF DAILY LIVING (ADL)
ADLS_ACUITY_SCORE: 7

## 2022-02-14 ASSESSMENT — ENCOUNTER SYMPTOMS
SEIZURES: 0
ORTHOPNEA: 0

## 2022-02-14 NOTE — PROGRESS NOTES
[facility]    Medicine Progress Note - Hospitalist Service       Date of Admission:  2/13/2022    Assessment & Plan   Kandis Tse is a 76 year old female with hx of HTN and depression/anxiety who was admitted on 2/13/2022 for a right hip fracture following a fall     Acute right hip fracture following a fall  Missed a step while walking down the stairs. XR pelvis and right hip on arrival revealed a comminuted intertrochanteric fracture of the right hip.  - Surgery planned for today, 2/14  - She has no medical contraindications to surgery  - Ortho following     Essential hypertension  - Continues on PTA carvedilol     Major recurrent depressive disorder and anxiety  - Continues on PTA citalopram     Diet: NPO per Anesthesia Guidelines for Procedure/Surgery Except for: Meds    DVT Prophylaxis: Pneumatic Compression Devices  Whiteside Catheter: Not present  Code Status: Full Code         Disposition: Expected discharge once cleared by Ortho and PT/OT, Wed at the earliest    The patient's care was discussed with the Bedside Nurse and Patient.    Phyllis Mortensen MD  Hospitalist Service  [facility]  Contact information available via [facility] Paging/Directory    ______________________________________________________________________    Interval History    Restless night due to uncontrolled pain. Otherwise no new events. Denies cp/sob, dizziness/lightheadedness. Awaiting surgery today.    Data reviewed today: I reviewed all medications, new labs and imaging results over the last 24 hours. I personally reviewed no images or EKG's today.    Physical Exam   Vital Signs: Temp: 98.1  F (36.7  C) Temp src: Oral BP: 133/70 Pulse: 72   Resp: 18 SpO2: 92 % O2 Device: None (Room air) Oxygen Delivery: 2 LPM  Weight: 140 lbs 0 oz  Constitutional: Appears comfortable  HEENT: Sclera white, MMM  Respiratory: Breathing non-labored. Lungs CTAB - no wheezes, crackles, or  rhonchi  Cardiovascular: Heart RRR, no m/r/g. No pedal edema  GI: +BS, abd soft/NT  Skin/Integument: No rash  Musculoskeletal: Normal muscle bulk and tone  Neuro: Alert and appropriate, RAJAN  Psych: Calm and cooperative    Data   Recent Labs   Lab 02/14/22  0834 02/13/22  1606   WBC 5.9 4.9   HGB 11.5* 13.0   * 101*   * 158    140   POTASSIUM 3.6 3.5   CHLORIDE 106 106   CO2 26 28   BUN 21 20   CR 0.57 0.69   ANIONGAP 6 6   GAVIN 8.4* 8.7   GLC 97 91         Recent Results (from the past 24 hour(s))   XR Pelvis w Hip Right 1 View    Narrative    EXAM: XR PELVIS AND HIP RIGHT 1 VIEW  LOCATION: [facility]  DATE/TIME: 2/13/2022 4:06 PM    INDICATION: Pain.  COMPARISON: None.      Impression    IMPRESSION: Comminuted intertrochanteric fracture of the right hip. Slight superolateral subluxation of the femoral shaft. No dislocation at the level of the hip joint. Postoperative changes of left total hip arthroplasty. There is some irregularity at   the junction of the left inferior pubic ramus and pubic body which is worrisome for fracture but this may represent a differing chronicity injury. Pelvis otherwise negative.       Medications     sodium chloride 100 mL/hr at 02/14/22 0009       sodium chloride (PF)  3 mL Intracatheter Q8H

## 2022-02-14 NOTE — ANESTHESIA CARE TRANSFER NOTE
Patient: Kandis Tse    Procedure: Procedure(s):  RIGHT INTERTROCHANTERIC FEMUR NAILING       Diagnosis: Intertrochanteric fracture of right femur (H) [S72.141A]  Diagnosis Additional Information: No value filed.    Anesthesia Type:   General     Note:    Oropharynx: oropharynx clear of all foreign objects and spontaneously breathing  Level of Consciousness: drowsy  Oxygen Supplementation: face mask  Level of Supplemental Oxygen (L/min / FiO2): 6  Independent Airway: airway patency satisfactory and stable  Dentition: dentition unchanged  Vital Signs Stable: post-procedure vital signs reviewed and stable  Report to RN Given: handoff report given  Patient transferred to: PACU  Comments: At end of procedure, spontaneous respirations, adequate tidal volumes, followed commands to voice, LMA removed atraumatically, oropharynx suctioned, airway patent after LMA removal. Oxygen via facemask at 6 liters per minute to PACU. Oxygen tubing connected to wall O2 in PACU, SpO2, NiBP, and EKG monitors and alarms on and functioning, Inga Hugger warmer connected to patient gown, report on patient's clinical status given to PACU RN, RN questions answered.  Handoff Report: Identifed the Patient, Identified the Reponsible Provider, Reviewed the pertinent medical history, Discussed the surgical course, Reviewed Intra-OP anesthesia mangement and issues during anesthesia, Set expectations for post-procedure period and Allowed opportunity for questions and acknowledgement of understanding      Vitals:  Vitals Value Taken Time   /64 02/14/22 1411   Temp     Pulse 72 02/14/22 1413   Resp 16 02/14/22 1413   SpO2 98 % 02/14/22 1413   Vitals shown include unvalidated device data.    Electronically Signed By: KELLIE Thomas CRNA  February 14, 2022  2:14 PM

## 2022-02-14 NOTE — CONSULTS
[facility]    Orthopedic Consultation    Kandis Tse MRN# [id number]   Age: 76 year old YOB: 1945     Date of Admission: 2/13/2022    Reason for consult: Right hip intertrochanteric fracture       Requesting provider: Phyllis Mortensen        Level of consult: Consult, follow and place orders           Assessment and Plan:   Assessment:   Right hip intertrochanteric fracture      Plan:   Patient scheduled for a right hip IM Nail later today  Surgeon: Dr INO Carcamo   NPO Status effective now  NWB/Bedrest until postop  Hold anticoagulants if taken (ASA)  Pain medication as needed, minimize narcotics as able           Chief Complaint:   Right hip pain          History of Present Illness:   Kandis Tse is a 76 year old female who presented to the ED last evening for evaluation of right hip pain following a fall. The patient was wearing her reading glasses while walking down the stairs, misjudged a step, and fell onto her right side. She has immediate pain and was unable to ambulate.  Xrays confirmed a right hip fracture.  She is an active independent ambulator at baseline.  She had a left ELBA by Dr Becker in 2021.           Past Medical History:     Past Medical History:   Diagnosis Date     Anxiety      Breast cancer (H)      Hx of radiation therapy      MI, old      Osteoarthritis     hips     Osteopenia      Peripheral vertigo      PONV (postoperative nausea and vomiting)      Stress-induced cardiomyopathy      T12 compression fracture (H)              Past Surgical History:     Past Surgical History:   Procedure Laterality Date     ARTHROPLASTY HIP Left 3/16/2021    Procedure: Left total hip arthroplasty (P[employer]on [employer]hew 52 mm acetabulum; #10 A-[employer] high offset stem; -2 neck 36 mm head);  Surgeon: Dante Becker MD;  Location: [facility] OR     BIOPSY BREAST Left Early 1990's     BREAST SURGERY      breast biopsy, breast lumpectomy     COSMETIC SURGERY       augmentation mammaplasty     GYN SURGERY      hysterectomy     HYSTERECTOMY  Early 1990's     IR THORACIC VERTEBROPLASTY  10/11/2016     LUMPECTOMY BREAST Left Early 1990's     MAMMOPLASTY AUGMENTATION Bilateral Late 1990's     ORTHOPEDIC SURGERY Bilateral     bunionectomy/10 toes repaired     OTHER SURGICAL HISTORY      lumpectomy     REPAIR HAMMER TOE Bilateral 4/18/2018    Procedure: REPAIR HAMMER TOE;  RIGHT SECOND, FOURTH, AND FIFTH  MALLET TOE RECONSTRUCTION WITH LEFT THIRD CLAWTOE RECONSTRUCTION (CHOICE)  ;  Surgeon: Edgar Carlisle MD;  Location: [facility] OR     WRIST SURGERY Left      ZZC TOTAL ABDOM HYSTERECTOMY      Description: Hysterectomy;  Recorded: 06/17/2013;             Social History:     Social History     Tobacco Use     Smoking status: Former Smoker     Smokeless tobacco: Never Used   Substance Use Topics     Alcohol use: No             Family History:     Family History   Problem Relation Age of Onset     Breast Cancer Mother         Post Menopausal     Dementia Father              Immunizations:     VACCINE/DOSE   Diptheria   DPT   DTAP   HBIG   Hepatitis A   Hepatitis B   HIB   Influenza   Measles   Meningococcal   MMR   Mumps   Pneumococcal   Polio   Rubella   Small Pox   TDAP   Varicella   Zoster             Allergies:   No Known Allergies          Medications:     Current Facility-Administered Medications   Medication     [Auto Hold] acetaminophen (TYLENOL) tablet 650 mg    Or     [Auto Hold] acetaminophen (TYLENOL) Suppository 650 mg     [Auto Hold] bisacodyl (DULCOLAX) Suppository 10 mg     [Auto Hold] HYDROmorphone (PF) (DILAUDID) injection 0.3-0.5 mg     [Auto Hold] ketamine (KETALAR) injection 5 mg     [Auto Hold] lidocaine (LMX4) cream     [Auto Hold] lidocaine 1 % 0.1-1 mL     [Auto Hold] magnesium hydroxide (MILK OF MAGNESIA) suspension 30 mL     [Auto Hold] melatonin tablet 3 mg     [Auto Hold] naloxone (NARCAN) injection 0.2 mg    Or     [Auto Hold] naloxone (NARCAN) injection 0.4  "mg    Or     [Auto Hold] naloxone (NARCAN) injection 0.2 mg    Or     [Auto Hold] naloxone (NARCAN) injection 0.4 mg     [Auto Hold] ondansetron (ZOFRAN-ODT) ODT tab 4 mg    Or     [Auto Hold] ondansetron (ZOFRAN) injection 4 mg     [Auto Hold] oxyCODONE (ROXICODONE) tablet 5-10 mg     [Auto Hold] senna-docusate (SENOKOT-S/PERICOLACE) 8.6-50 MG per tablet 1 tablet    Or     [Auto Hold] senna-docusate (SENOKOT-S/PERICOLACE) 8.6-50 MG per tablet 2 tablet     [Auto Hold] sodium chloride (PF) 0.9% PF flush 3 mL     [Auto Hold] sodium chloride (PF) 0.9% PF flush 3 mL     sodium chloride 0.9% infusion             Review of Systems:   ROS:  10 point ROS neg other than the symptoms noted above in the HPI.            Physical Exam:   All vitals have been reviewed  Patient Vitals for the past 24 hrs:   BP Temp Temp src Pulse Resp SpO2 Height Weight   02/14/22 0837 -- -- -- -- 16 -- -- --   02/14/22 0801 133/70 98.1  F (36.7  C) Oral 72 18 92 % -- --   02/14/22 0540 -- -- -- -- 18 -- -- --   02/14/22 0523 -- -- -- -- 18 -- -- --   02/14/22 0421 -- -- -- -- 16 -- -- --   02/14/22 0055 -- -- -- -- 15 -- -- --   02/14/22 0008 -- -- -- -- 16 -- -- --   02/14/22 0006 (!) 141/80 98  F (36.7  C) Oral 85 16 94 % -- --   02/13/22 2229 (!) 162/95 -- -- 84 -- 96 % -- --   02/13/22 2053 (!) 164/88 97.7  F (36.5  C) Oral 76 16 97 % -- --   02/13/22 2037 -- -- -- -- 16 -- -- --   02/13/22 2020 (!) 151/89 -- -- 72 -- 96 % -- --   02/13/22 2000 (!) 142/82 -- -- 79 -- -- -- --   02/13/22 1900 (!) 173/100 -- -- 79 -- 92 % -- --   02/13/22 1800 (!) 149/83 -- -- 74 -- 96 % -- --   02/13/22 1745 (!) 144/75 -- -- -- -- 90 % -- --   02/13/22 1645 (!) 160/93 -- -- -- -- 96 % -- --   02/13/22 1536 (!) 162/88 98.2  F (36.8  C) Oral 79 16 98 % 1.727 m (5' 8\") 63.5 kg (140 lb)       Intake/Output Summary (Last 24 hours) at 2/14/2022 1128  Last data filed at 2/14/2022 0528  Gross per 24 hour   Intake --   Output 400 ml   Net -400 ml         Physical Exam "   Temp: 98.1  F (36.7  C) Temp src: Oral BP: 133/70 Pulse: 72   Resp: 16 SpO2: 92 % O2 Device: None (Room air) Oxygen Delivery: 2 LPM  Vital Signs with Ranges  Temp:  [97.7  F (36.5  C)-98.2  F (36.8  C)] 98.1  F (36.7  C)  Pulse:  [72-85] 72  Resp:  [15-18] 16  BP: (133-173)/() 133/70  SpO2:  [90 %-98 %] 92 %  140 lbs 0 oz    Constitutional: Pleasant, alert, appropriate, following commands.  HEENT: Head atraumatic normocephalic. Pupils equal round and reactive to light.  Respiratory: Unlabored breathing no audible wheeze  Cardiovascular: Regular rate and rhythm per pulses  GI: Abdomen non-distended.  Lymph/Hematologic: No lymphadenopathy in areas examined  Genitourinary:  No wilson.  Has purewick   Skin: No rashes, no cyanosis, no edema.  Musculoskeletal: On physical exam of the Right lower extremity, patient's leg is resting in a shortened and externally rotated position.  No skin abrasions seen.  Patient is able to dorsi and plantarflex both ankles with equal resistance.  Full sensation to light touch on the left versus right lower extremities.  Distal pulses are intact and equal bilaterally.    Neurologic: normal without focal findings, mental status, speech normal, alert and oriented x iii  Neuropsychiatric: stable            Data:   All laboratory data reviewed  Results for orders placed or performed during the hospital encounter of 02/13/22   XR Pelvis w Hip Right 1 View     Status: None    Narrative    EXAM: XR PELVIS AND HIP RIGHT 1 VIEW  LOCATION: [facility]  DATE/TIME: 2/13/2022 4:06 PM    INDICATION: Pain.  COMPARISON: None.      Impression    IMPRESSION: Comminuted intertrochanteric fracture of the right hip. Slight superolateral subluxation of the femoral shaft. No dislocation at the level of the hip joint. Postoperative changes of left total hip arthroplasty. There is some irregularity at   the junction of the left inferior pubic ramus and pubic body which is worrisome  for fracture but this may represent a differing chronicity injury. Pelvis otherwise negative.   Basic metabolic panel     Status: Normal   Result Value Ref Range    Sodium 140 133 - 144 mmol/L    Potassium 3.5 3.4 - 5.3 mmol/L    Chloride 106 94 - 109 mmol/L    Carbon Dioxide (CO2) 28 20 - 32 mmol/L    Anion Gap 6 3 - 14 mmol/L    Urea Nitrogen 20 7 - 30 mg/dL    Creatinine 0.69 0.52 - 1.04 mg/dL    Calcium 8.7 8.5 - 10.1 mg/dL    Glucose 91 70 - 99 mg/dL    GFR Estimate 89 >60 mL/min/1.73m2   CBC with platelets and differential     Status: Abnormal   Result Value Ref Range    WBC Count 4.9 4.0 - 11.0 10e3/uL    RBC Count 3.95 3.80 - 5.20 10e6/uL    Hemoglobin 13.0 11.7 - 15.7 g/dL    Hematocrit 39.7 35.0 - 47.0 %     (H) 78 - 100 fL    MCH 32.9 26.5 - 33.0 pg    MCHC 32.7 31.5 - 36.5 g/dL    RDW 12.5 10.0 - 15.0 %    Platelet Count 158 150 - 450 10e3/uL    % Neutrophils 48 %    % Lymphocytes 35 %    % Monocytes 13 %    % Eosinophils 4 %    % Basophils 0 %    % Immature Granulocytes 0 %    NRBCs per 100 WBC 0 <1 /100    Absolute Neutrophils 2.3 1.6 - 8.3 10e3/uL    Absolute Lymphocytes 1.7 0.8 - 5.3 10e3/uL    Absolute Monocytes 0.6 0.0 - 1.3 10e3/uL    Absolute Eosinophils 0.2 0.0 - 0.7 10e3/uL    Absolute Basophils 0.0 0.0 - 0.2 10e3/uL    Absolute Immature Granulocytes 0.0 <=0.4 10e3/uL    Absolute NRBCs 0.0 10e3/uL   Asymptomatic COVID-19 Virus (Coronavirus) by PCR Nasopharyngeal     Status: Normal    Specimen: Nasopharyngeal; Swab   Result Value Ref Range    SARS CoV2 PCR Negative Negative    Narrative    Testing was performed using the [employer]avia  SARS-CoV-2 & Influenza A/B Assay on the octavia  Prachi  System.  This test should be ordered for the detection of SARS-COV-2 in individuals who meet SARS-CoV-2 clinical and/or epidemiological criteria. Test performance is unknown in asymptomatic patients.  This test is for in vitro diagnostic use under the FDA EUA for laboratories certified under CLIA to perform  moderate and/or high complexity testing. This test has not been FDA cleared or approved.  A negative test does not rule out the presence of PCR inhibitors in the specimen or target RNA in concentration below the limit of detection for the assay. The possibility of a false negative should be considered if the patient's recent exposure or clinical presentation suggests COVID-19.  [facility] are certified under the Clinical Laboratory Improvement Amendments of 1988 (CLIA-88) as qualified to perform moderate and/or high complexity laboratory testing.   Basic metabolic panel     Status: Abnormal   Result Value Ref Range    Sodium 138 133 - 144 mmol/L    Potassium 3.6 3.4 - 5.3 mmol/L    Chloride 106 94 - 109 mmol/L    Carbon Dioxide (CO2) 26 20 - 32 mmol/L    Anion Gap 6 3 - 14 mmol/L    Urea Nitrogen 21 7 - 30 mg/dL    Creatinine 0.57 0.52 - 1.04 mg/dL    Calcium 8.4 (L) 8.5 - 10.1 mg/dL    Glucose 97 70 - 99 mg/dL    GFR Estimate >90 >60 mL/min/1.73m2   CBC with platelets     Status: Abnormal   Result Value Ref Range    WBC Count 5.9 4.0 - 11.0 10e3/uL    RBC Count 3.44 (L) 3.80 - 5.20 10e6/uL    Hemoglobin 11.5 (L) 11.7 - 15.7 g/dL    Hematocrit 35.2 35.0 - 47.0 %     (H) 78 - 100 fL    MCH 33.4 (H) 26.5 - 33.0 pg    MCHC 32.7 31.5 - 36.5 g/dL    RDW 12.7 10.0 - 15.0 %    Platelet Count 138 (L) 150 - 450 10e3/uL   EKG 12-lead, tracing only     Status: None   Result Value Ref Range    Systolic Blood Pressure  mmHg    Diastolic Blood Pressure  mmHg    Ventricular Rate 78 BPM    Atrial Rate 78 BPM    AL Interval 168 ms    QRS Duration 66 ms     ms    QTc 453 ms    P Axis 77 degrees    R AXIS 19 degrees    T Axis 59 degrees    Interpretation ECG       Sinus rhythm with occasional Premature ventricular complexes  Septal infarct , age undetermined  Abnormal ECG  When compared with ECG of 03-APR-2018 11:28,  Premature ventricular complexes are now Present  Septal infarct is now  Present  Confirmed by GENERATED REPORT, COMPUTER ([id number]),  Nava Roth ([id number]) on 2/13/2022 5:23:17 PM     Adult Type and Screen     Status: None   Result Value Ref Range    ABO/RH(D) A POS     Antibody Screen Negative Negative    SPECIMEN EXPIRATION DATE [id number]    CBC with platelets differential     Status: Abnormal    Narrative    The following orders were created for panel order CBC with platelets differential.  Procedure                               Abnormality         Status                     ---------                               -----------         ------                     CBC with platelets and d...[[id number]]  Abnormal            Final result                 Please view results for these tests on the individual orders.   ABO/Rh type and screen     Status: None    Narrative    The following orders were created for panel order ABO/Rh type and screen.  Procedure                               Abnormality         Status                     ---------                               -----------         ------                     Adult Type and Screen[[id number]]                            Final result                 Please view results for these tests on the individual orders.          Attestation:  I have reviewed today's vital signs, notes, medications, labs and imaging with Dr. Carcamo.  Amount of time performed on this consult: 30 minutes.    Jovana Price PA-C

## 2022-02-14 NOTE — H&P
History and Physical - Hospitalist Service       Date of Admission:  2/13/2022    Assessment & Plan   Kandis Tse is a 76 year old female with hx of HTN and depression/anxiety who was admitted on 2/13/2022 for a right hip fracture following a fall    Acute right hip fracture following a fall  Missed a step while walking down the stairs. XR pelvis and right hip on arrival revealed a comminuted intertrochanteric fracture of the right hip.  - Ortho consult requested  - She has no medical contraindications to surgery    Essential hypertension  - Continues on PTA carvedilol    Major recurrent depressive disorder and anxiety  - Continues on PTA citalopram    Diet: Regular diet, NPO after midnight  DVT Prophylaxis: Pneumatic Compression Devices  Whiteside Catheter: Not present  Code Status: Full Code       Disposition: Expected discharge once cleared by Ortho and PT/OT, 2-3 more days    Phyllis Mortensen MD    Contact information available via [facility] Paging/Directory      ______________________________________________________________________    Chief Complaint   Right hip pain following a fall    History is obtained from the patient, discussion with ED staff, and review of medical records    History of Present Illness   Kandis Tse is a 76 year old female with hx of HTN and depression/anxiety who presents for evaluation of right hip pain following a fall. The patient was wearing her reading glasses while walking down the stairs, misjudged a step, and fell onto her right side. She denies hitting her head or LOC. She denies history of weakness or falls. She denies fevers/chills or recent illnesses. She denies chest pain or shortness of breath.     Per chart review, she has a noted history of CAD (but noted to be mild on coronary angiogram) as well as stress-induced cardiomyopathy which has resolved; subsequent TTEs were noted to be  normal. She is quite active, engages in regular exercise, and believes she can climb at least a flight of stairs without difficulty.     In the ED, a pelvic/R hip XR revealed a comminuted intertrochanteric fracture of the right hip.     Review of Systems    10 point Review of Systems was reviewed and pertinent positives and negatives are noted in the HPI    Past Medical History    I have reviewed this patient's medical history and updated it with pertinent information if needed.   Past Medical History:   Diagnosis Date     Anxiety      Breast cancer (H)      Hx of radiation therapy      MI, old      Osteoarthritis     hips     Osteopenia      Peripheral vertigo      PONV (postoperative nausea and vomiting)      Stress-induced cardiomyopathy      T12 compression fracture (H)        Past Surgical History   I have reviewed this patient's surgical history and updated it with pertinent information if needed.  Past Surgical History:   Procedure Laterality Date     ARTHROPLASTY HIP Left 3/16/2021    Procedure: Left total hip arthroplasty (Peterson [employer] Nephew 52 mm acetabulum; #10 A-Fit Anthology high offset stem; -2 neck 36 mm head);  Surgeon: Dante Becker MD;  Location: [facility] OR     BIOPSY BREAST Left Early 1990's     BREAST SURGERY      breast biopsy, breast lumpectomy     COSMETIC SURGERY      augmentation mammaplasty     GYN SURGERY      hysterectomy     HYSTERECTOMY  Early 1990's     IR THORACIC VERTEBROPLASTY  10/11/2016     LUMPECTOMY BREAST Left Early 1990's     MAMMOPLASTY AUGMENTATION Bilateral Late 1990's     ORTHOPEDIC SURGERY Bilateral     bunionectomy/10 toes repaired     OTHER SURGICAL HISTORY      lumpectomy     REPAIR HAMMER TOE Bilateral 4/18/2018    Procedure: REPAIR HAMMER TOE;  RIGHT SECOND, FOURTH, AND FIFTH  MALLET TOE RECONSTRUCTION WITH LEFT THIRD CLAWTOE RECONSTRUCTION (CHOICE)  ;  Surgeon: Edgar Carlisle MD;  Location: [facility] OR     WRIST SURGERY Left      [facility] TOTAL ABDOM HYSTERECTOMY       Description: Hysterectomy;  Recorded: 06/17/2013;       Social History   Remote history of smoking - quit in her 20s. She denies alcohol use.    Family History   I have reviewed this patient's family history and updated it with pertinent information if needed.  Family History   Problem Relation Age of Onset     Breast Cancer Mother         Post Menopausal     Dementia Father        Prior to Admission Medications   Prior to Admission Medications   Prescriptions Last Dose Informant Patient Reported? Taking?   aspirin (ASA) 81 MG chewable tablet 2/12/2022 at Unknown time  Yes Yes   Sig: Take 81 mg by mouth daily   carvedilol (COREG) 6.25 MG tablet 2/12/2022 at am  No Yes   Sig: Take 1 tablet (6.25 mg) by mouth 2 times daily (with meals)   citalopram (CELEXA) 20 MG tablet 2/12/2022 at Unknown time  No Yes   Sig: Take 1 tablet (20 mg) by mouth daily   naproxen sodium (ANAPROX) 220 MG tablet Past Month at Unknown time  Yes Yes   Sig: Take 220 mg by mouth 2 times daily as needed for moderate pain      Facility-Administered Medications: None     Allergies   No Known Allergies    Physical Exam   Vital Signs: Temp: 98.2  F (36.8  C) Temp src: Oral BP: (!) 144/75 Pulse: 79   Resp: 16 SpO2: 90 % O2 Device: None (Room air)    Weight: 140 lbs 0 oz    Constitutional: Resting in bed, NAD  HEENT: NC/AT, sclera white, conjunctiva clear, EOMI, MMM  Respiratory: Breathing non-labored. Lungs CTAB - no wheezes/crackles/rhonchi  Cardiovascular: Heart RRR, no m/r/g. No pedal edema.   GI: +BS. Abd soft/NT  Lymph/Hematologic: No cervical LAD  Genitourinary: Not examined  Skin: Warm and dry. No rash.  Musculoskeletal: Normal muscle bulk and tone  Neurologic: Alert and appropriate. RAJAN  Psychiatric: Calm and cooperative     Data   Data reviewed today: I reviewed all medications, new labs and imaging results over the last 24 hours. I personally reviewed the EKG tracing showing sinus rhythm with occasional PVCs and the XR pelvis/R hip  image(s)  showing comminuted intertrochanteric right hip fracture.    Recent Labs   Lab 02/13/22  1606   WBC 4.9   HGB 13.0   *         POTASSIUM 3.5   CHLORIDE 106   CO2 28   BUN 20   CR 0.69   ANIONGAP 6   GAVIN 8.7   GLC 91         Recent Results (from the past 24 hour(s))   XR Pelvis w Hip Right 1 View    Narrative    EXAM: XR PELVIS AND HIP RIGHT 1 VIEW  LOCATION: [facility]  DATE/TIME: 2/13/2022 4:06 PM    INDICATION: Pain.  COMPARISON: None.      Impression    IMPRESSION: Comminuted intertrochanteric fracture of the right hip. Slight superolateral subluxation of the femoral shaft. No dislocation at the level of the hip joint. Postoperative changes of left total hip arthroplasty. There is some irregularity at   the junction of the left inferior pubic ramus and pubic body which is worrisome for fracture but this may represent a differing chronicity injury. Pelvis otherwise negative.

## 2022-02-14 NOTE — ANESTHESIA PREPROCEDURE EVALUATION
Anesthesia Pre-Procedure Evaluation    Patient: Kandis Tse   MRN: [id number] : 1945        Preoperative Diagnosis: Intertrochanteric fracture of right femur (H) [S72.141A]    Procedure : Procedure(s):  RIGHT INTERTROCHANTERIC FEMUR NAILING          Past Medical History:   Diagnosis Date     Anxiety      Breast cancer (H)      Hx of radiation therapy      MI, old      Osteoarthritis     hips     Osteopenia      Peripheral vertigo      PONV (postoperative nausea and vomiting)      Stress-induced cardiomyopathy      T12 compression fracture (H)       Past Surgical History:   Procedure Laterality Date     ARTHROPLASTY HIP Left 3/16/2021    Procedure: Left total hip arthroplasty (Peterson [employer] Nephew 52 mm acetabulum; #10 A-Fit Anthology high offset stem; -2 neck 36 mm head);  Surgeon: Dante Becker MD;  Location: [facility] OR     BIOPSY BREAST Left Early      BREAST SURGERY      breast biopsy, breast lumpectomy     COSMETIC SURGERY      augmentation mammaplasty     GYN SURGERY      hysterectomy     HYSTERECTOMY  Early      IR THORACIC VERTEBROPLASTY  10/11/2016     LUMPECTOMY BREAST Left Early      MAMMOPLASTY AUGMENTATION Bilateral Late      ORTHOPEDIC SURGERY Bilateral     bunionectomy/10 toes repaired     OTHER SURGICAL HISTORY      lumpectomy     REPAIR HAMMER TOE Bilateral 2018    Procedure: REPAIR HAMMER TOE;  RIGHT SECOND, FOURTH, AND FIFTH  MALLET TOE RECONSTRUCTION WITH LEFT THIRD CLAWTOE RECONSTRUCTION (CHOICE)  ;  Surgeon: Edgar Carlisle MD;  Location: [facility] OR     WRIST SURGERY Left      [facility] TOTAL ABDOM HYSTERECTOMY      Description: Hysterectomy;  Recorded: 2013;      No Known Allergies   Social History     Tobacco Use     Smoking status: Former Smoker     Smokeless tobacco: Never Used   Substance Use Topics     Alcohol use: No      Wt Readings from Last 1 Encounters:   22 63.5 kg (140 lb)      EKG  Sinus rhythm with occasional Premature ventricular  complexes   Septal infarct , age undetermined   Abnormal ECG   When compared with ECG of 03-APR-2018 11:28,   Premature ventricular complexes are now Present   Septal infarct is now Present   Confirmed by GENERATED REPORT, COMPUTER ([id number]),  Nava Roth ([id number]) on 2/13/2022 5:23:17 PM     Echo  Interpretation Summary     Technically difficult, suboptimal study. The left ventricle is normal in size.  There is normal left ventricular wall thickness. Left ventricular systolic  function is normal. The visual ejection fraction is estimated at 55-60%. Left  ventricular diastolic function is normal. No regional wall motion  abnormalities noted.  The right ventricle is normal size. The right ventricular systolic function is  normal.  Trace mitral and tricuspid regurgitation.  No pericardial effusion.  _____________________________________________________________________________  __        Left Ventricle  The left ventricle is normal in size. There is normal left ventricular wall  thickness. Left ventricular systolic function is normal. The visual ejection  fraction is estimated at 55-60%. Left ventricular diastolic function is  normal. No regional wall motion abnormalities noted.     Right Ventricle  The right ventricle is normal size. The right ventricular systolic function is  normal.     Atria  Normal left atrial size. Right atrial size is normal. There is no color  Doppler evidence of an atrial shunt.     Mitral Valve  There is trace mitral regurgitation.     Tricuspid Valve  There is trace tricuspid regurgitation. The right ventricular systolic  pressure is approximated at 18.2 mmHg plus the right atrial pressure.        Aortic Valve  The aortic valve is trileaflet. No aortic regurgitation is present. No aortic  stenosis is present.     Pulmonic Valve  There is no pulmonic valvular stenosis.     Vessels  The aortic root is normal size. Normal size ascending aorta. Dilation of the  inferior vena cava is present  with normal respiratory variation in diameter.     Pericardium  There is no pericardial effusion.     Rhythm  Sinus rhythm was noted.  Anesthesia Evaluation   Pt has had prior anesthetic.     History of anesthetic complications  - PONV.      ROS/MED HX  ENT/Pulmonary:    (-) sleep apnea and recent URI   Neurologic: Comment: vertigo    (+) TIA, features: no residual.  (-) no seizures and migraines   Cardiovascular: Comment: Stress induced cardiomyopathy - resolved    (+) hypertension--CAD --- (-) CHF and orthopnea/PND   METS/Exercise Tolerance:     Hematologic:  - neg hematologic  ROS     Musculoskeletal: Comment: Osteopenia     T 12 compression fracture    S/p Left total hip arthroplasty - March 2021  (+) arthritis, fracture (mechanical fall), Fracture location: RUE,     GI/Hepatic:  - neg GI/hepatic ROS  (-) GERD   Renal/Genitourinary:  - neg Renal ROS     Endo:  - neg endo ROS  (-) Type II DM and thyroid disease   Psychiatric/Substance Use:     (+) psychiatric history anxiety and depression     Infectious Disease:  - neg infectious disease ROS     Malignancy:   (+) Malignancy, History of Breast.Breast CA status post Radiation.        Other:            Physical Exam    Airway  airway exam normal      Mallampati: II   TM distance: > 3 FB   Neck ROM: full   Mouth opening: > 3 cm    Respiratory Devices and Support         Dental  no notable dental history         Cardiovascular   cardiovascular exam normal       Rhythm and rate: regular and normal     Pulmonary   pulmonary exam normal        breath sounds clear to auscultation           OUTSIDE LABS:  CBC:   Lab Results   Component Value Date    WBC 5.9 02/14/2022    WBC 4.9 02/13/2022    HGB 11.5 (L) 02/14/2022    HGB 13.0 02/13/2022    HCT 35.2 02/14/2022    HCT 39.7 02/13/2022     (L) 02/14/2022     02/13/2022     BMP:   Lab Results   Component Value Date     02/14/2022     02/13/2022    POTASSIUM 3.6 02/14/2022    POTASSIUM 3.5 02/13/2022     CHLORIDE 106 02/14/2022    CHLORIDE 106 02/13/2022    CO2 26 02/14/2022    CO2 28 02/13/2022    BUN 21 02/14/2022    BUN 20 02/13/2022    CR 0.57 02/14/2022    CR 0.69 02/13/2022    GLC 97 02/14/2022    GLC 91 02/13/2022     COAGS: No results found for: PTT, INR, FIBR  POC:   Lab Results   Component Value Date     (H) 04/10/2009     HEPATIC:   Lab Results   Component Value Date    ALBUMIN 4.0 01/03/2022    PROTTOTAL 7.4 01/03/2022    ALT 38 01/03/2022    AST 27 01/03/2022    ALKPHOS 69 01/03/2022    BILITOTAL 0.7 01/03/2022     OTHER:   Lab Results   Component Value Date    GAVIN 8.4 (L) 02/14/2022    TSH 0.62 04/10/2009       Anesthesia Plan    ASA Status:  2   NPO Status:  NPO Appropriate    Anesthesia Type: General.     - Airway: LMA   Induction: Propofol.   Maintenance: TIVA.        Consents    Anesthesia Plan(s) and associated risks, benefits, and realistic alternatives discussed. Questions answered and patient/representative(s) expressed understanding.    - Discussed:     - Discussed with:  Patient         Postoperative Care    Pain management: Multi-modal analgesia.   PONV prophylaxis: Ondansetron (or other 5HT-3), Aprepitant, Dexamethasone or Solumedrol, Droperidol or Haldol     Comments:                Power Romano MD

## 2022-02-14 NOTE — ANESTHESIA POSTPROCEDURE EVALUATION
Patient: Kandis Tse    Procedure: Procedure(s):  RIGHT INTERTROCHANTERIC FEMUR NAILING       Diagnosis:Intertrochanteric fracture of right femur (H) [S72.141A]  Diagnosis Additional Information: No value filed.    Anesthesia Type:  General    Note:     Postop Pain Control: Uneventful            Sign Out: Well controlled pain   PONV: No   Neuro/Psych: Uneventful            Sign Out: Acceptable/Baseline neuro status   Airway/Respiratory: Uneventful            Sign Out: Acceptable/Baseline resp. status   CV/Hemodynamics: Uneventful            Sign Out: Acceptable CV status; No obvious hypovolemia; No obvious fluid overload   Other NRE: NONE   DID A NON-ROUTINE EVENT OCCUR? No           Last vitals:  Vitals Value Taken Time   /64 02/14/22 1510   Temp 37.4  C (99.4  F) 02/14/22 1500   Pulse 69 02/14/22 1515   Resp 8 02/14/22 1515   SpO2 99 % 02/14/22 1518   Vitals shown include unvalidated device data.    Electronically Signed By: Power Romano MD  February 14, 2022  5:07 PM

## 2022-02-14 NOTE — PROGRESS NOTES
I reviewed the imaging and discussed the case with Dr. Mcghee.  76 year old female with right intertrochanteric femur fracture.  Will plan for surgical fixation tomorrow pending medical clearance.  NPO at midnight.

## 2022-02-14 NOTE — ED NOTES
Pt rates pain 3/10 after ketamine. Placed on 2L O2 via NC d/t O2 sats dropping to 87%. Pt awake and alert.

## 2022-02-14 NOTE — PLAN OF CARE
Shift Note: 2100-0730  Pt A&Ox4, HTN on RA. Fell down a flight of stairs at home, R hip fracture. CMS intact, edema to R hip/leg. Pt hit elbow & knee during fall, abrasion to R elbow & bruise to R knee. Mepliex applied to elbow. Pain managed w/ PRN tylenol, oxycodone x2 & IV dilaudid x1. Ice applied. Reported int nausea when arrived from ED but resolved. NPO @ MN. PIV infusing NS @ 100mL/hr. Bedrest, voiding using purewick. Pt has a few belongings in hospital safe. Plan for surgical fixation today.

## 2022-02-14 NOTE — PLAN OF CARE
RECEIVING UNIT ED HANDOFF REVIEW    ED Nurse Handoff Report was reviewed by: Lorena Hall RN on February 13, 2022 at 8:21 PM

## 2022-02-14 NOTE — ANESTHESIA PROCEDURE NOTES
Airway       Patient location during procedure: OR  Staff -        CRNA: Sarita Hernández APRN CRNA       Performed By: CRNA  Consent for Airway        Urgency: elective  Indications and Patient Condition       Indications for airway management: agusto-procedural       Induction type:intravenous       Mask difficulty assessment: 0 - not attempted    Final Airway Details       Final airway type: supraglottic airway    Supraglottic Airway Details        Type: LMA       Brand: Ambu AuraGain       LMA size: 4    Post intubation assessment        Placement verified by: capnometry, equal breath sounds and chest rise        Number of attempts at approach: 1       Secured with: pink tape       Ease of procedure: easy       Dentition: Unchanged

## 2022-02-14 NOTE — OP NOTE
Procedure Date: 02/14/2022    PREOPERATIVE DIAGNOSIS:  Right intertrochanteric femur fracture.    POSTOPERATIVE DIAGNOSIS:  Right intertrochanteric femur fracture.    PROCEDURE:  Internal fixation of right intertrochanteric femur fracture using an Arthrex trochanteric femoral nail.    SURGEON:  Karthik Carcamo MD    FIRST ASSISTANT:  Salome Liu PA-C    INDICATIONS FOR PROCEDURE:  Ms. Tse is a very pleasant 76-year-old female who fell yesterday when she missed a step at home, landing on her right hip.  She suffered a right intertrochanteric femur fracture, was unable to move and ambulate, so she was taken to [facility] where she was found to have this fracture and admitted for definitive care.  We had a long discussion of treatment options.  I recommended internal fixation with an intertrochanteric hip screw.  She understands the risks, benefits and alternatives and wished to proceed with surgery.    NARRATIVE EVENTS:  After thorough evaluation and proper identification of the extremity to be operated on, Ms. Tse was taken to the operating room where she underwent general anesthetics and was placed supine on the fracture table and her right leg was prepped and draped in the usual sterile manner.  After appropriate surgical pause confirming the patient's extremity to be operated on, 30 minutes after received 2 grams of Ancef, her right hip was reduced anatomically under biplanar fluoroscopy, then the right hip was prepped and draped in the usual sterile manner.  After appropriate surgical pause confirming the patient's extremity to be operated on, we approached her right hip through a lateral incision in line with the femur just proximal to the greater trochanter.  Skin and soft tissue were sharply dissected down to the tensor fascia.  The fascia was then split in line with its fiber in line with femur, taken down, allowing us access down to the tip of the greater trochanter.  Once this  was here, we placed our guide pin in the tip of the greater trochanter such that it was centered in line with the center of the femoral canal on the lateral view of the femur.  Here, we over-reamed this with the initial entry reamer and then we passed our guidewire down the femur.  It measured to fit a size 390 mm trochanteric femoral nail.  We over-reamed this with a 13 mm reamer to fit an 11 mm nail.  We then passed an 11 x 390 mm nail over the guidewire across the fracture.  This was with a 130 neck angle for our lag screw.  Once this was in position, we made a small lateral incision for our lag screw and we placed the pin for the lag screw such that it was centered in the femoral head on the AP and lateral views of the femur.  Once this was done, we then measured and drilled and placed a 100 mm lag screw into position.  With the lag screw into position, we then locked it into position using the set device and compressed the fracture slightly.  At this point, we then placed a distal interlocking screw through the ES hole by making a small lateral neck incision laterally along the femur and then using the aiming trocars and placed a 34 mm in length screw into position.  Once this was done, we then removed the aiming arm from the alan.  We thoroughly irrigated the wounds.  We checked the position of all hardware and fracture reduction using biplanar fluoroscopy, felt we had anatomic reduction and appropriate hardware placement.  At this point, we then thoroughly irrigated the wounds and we closed them in layers with absorbable sutures and staples in the skin.  The patient was placed in a well-padded postoperative dressing, taken to recovery room in stable condition.  She tolerated the procedure without difficulty.    Karthik Carcamo MD        D: 2022   T: 2022   MT: GURPREETMT    Name:     DAPHNIE GILES  MRN:      [id number]        Account:        [id number]   :      1945            Procedure Date: 02/14/2022     Document: [id number]

## 2022-02-15 ENCOUNTER — APPOINTMENT (OUTPATIENT)
Dept: PHYSICAL THERAPY | Facility: CLINIC | Age: 77
DRG: 481 | End: 2022-02-15
Payer: COMMERCIAL

## 2022-02-15 ENCOUNTER — APPOINTMENT (OUTPATIENT)
Dept: OCCUPATIONAL THERAPY | Facility: CLINIC | Age: 77
DRG: 481 | End: 2022-02-15
Attending: ORTHOPAEDIC SURGERY
Payer: COMMERCIAL

## 2022-02-15 LAB
GLUCOSE BLDC GLUCOMTR-MCNC: 113 MG/DL (ref 70–99)
HGB BLD-MCNC: 9.9 G/DL (ref 11.7–15.7)

## 2022-02-15 PROCEDURE — 250N000013 HC RX MED GY IP 250 OP 250 PS 637: Performed by: ORTHOPAEDIC SURGERY

## 2022-02-15 PROCEDURE — 258N000003 HC RX IP 258 OP 636: Performed by: ORTHOPAEDIC SURGERY

## 2022-02-15 PROCEDURE — 97110 THERAPEUTIC EXERCISES: CPT | Mod: GP

## 2022-02-15 PROCEDURE — 250N000011 HC RX IP 250 OP 636: Performed by: ORTHOPAEDIC SURGERY

## 2022-02-15 PROCEDURE — 85018 HEMOGLOBIN: CPT | Performed by: ORTHOPAEDIC SURGERY

## 2022-02-15 PROCEDURE — 36415 COLL VENOUS BLD VENIPUNCTURE: CPT | Performed by: ORTHOPAEDIC SURGERY

## 2022-02-15 PROCEDURE — 120N000001 HC R&B MED SURG/OB

## 2022-02-15 PROCEDURE — 97116 GAIT TRAINING THERAPY: CPT | Mod: GP

## 2022-02-15 PROCEDURE — 97166 OT EVAL MOD COMPLEX 45 MIN: CPT | Mod: GO | Performed by: OCCUPATIONAL THERAPIST

## 2022-02-15 PROCEDURE — 97530 THERAPEUTIC ACTIVITIES: CPT | Mod: GP

## 2022-02-15 PROCEDURE — 97161 PT EVAL LOW COMPLEX 20 MIN: CPT | Mod: GP

## 2022-02-15 PROCEDURE — 99232 SBSQ HOSP IP/OBS MODERATE 35: CPT | Performed by: INTERNAL MEDICINE

## 2022-02-15 PROCEDURE — 97530 THERAPEUTIC ACTIVITIES: CPT | Mod: GO | Performed by: OCCUPATIONAL THERAPIST

## 2022-02-15 PROCEDURE — 97535 SELF CARE MNGMENT TRAINING: CPT | Mod: GO | Performed by: OCCUPATIONAL THERAPIST

## 2022-02-15 RX ADMIN — CELECOXIB 100 MG: 100 CAPSULE ORAL at 08:53

## 2022-02-15 RX ADMIN — POLYETHYLENE GLYCOL 3350 17 G: 17 POWDER, FOR SOLUTION ORAL at 08:54

## 2022-02-15 RX ADMIN — OXYCODONE HYDROCHLORIDE 10 MG: 5 TABLET ORAL at 08:53

## 2022-02-15 RX ADMIN — ACETAMINOPHEN 975 MG: 325 TABLET, FILM COATED ORAL at 13:28

## 2022-02-15 RX ADMIN — SENNOSIDES AND DOCUSATE SODIUM 1 TABLET: 50; 8.6 TABLET ORAL at 21:12

## 2022-02-15 RX ADMIN — SENNOSIDES AND DOCUSATE SODIUM 1 TABLET: 50; 8.6 TABLET ORAL at 08:54

## 2022-02-15 RX ADMIN — ENOXAPARIN SODIUM 40 MG: 40 INJECTION SUBCUTANEOUS at 13:29

## 2022-02-15 RX ADMIN — CARVEDILOL 6.25 MG: 3.12 TABLET, FILM COATED ORAL at 08:53

## 2022-02-15 RX ADMIN — FAMOTIDINE 20 MG: 20 TABLET ORAL at 21:12

## 2022-02-15 RX ADMIN — OXYCODONE HYDROCHLORIDE 10 MG: 5 TABLET ORAL at 13:28

## 2022-02-15 RX ADMIN — CEFAZOLIN 1 G: 1 INJECTION, POWDER, FOR SOLUTION INTRAMUSCULAR; INTRAVENOUS at 05:36

## 2022-02-15 RX ADMIN — CITALOPRAM HYDROBROMIDE 20 MG: 20 TABLET ORAL at 08:54

## 2022-02-15 RX ADMIN — FAMOTIDINE 20 MG: 20 TABLET ORAL at 08:54

## 2022-02-15 RX ADMIN — CELECOXIB 100 MG: 100 CAPSULE ORAL at 21:12

## 2022-02-15 RX ADMIN — OXYCODONE HYDROCHLORIDE 10 MG: 5 TABLET ORAL at 19:38

## 2022-02-15 RX ADMIN — SODIUM CHLORIDE, POTASSIUM CHLORIDE, SODIUM LACTATE AND CALCIUM CHLORIDE: 600; 310; 30; 20 INJECTION, SOLUTION INTRAVENOUS at 03:42

## 2022-02-15 RX ADMIN — ASPIRIN 81 MG CHEWABLE TABLET 81 MG: 81 TABLET CHEWABLE at 08:54

## 2022-02-15 RX ADMIN — ACETAMINOPHEN 975 MG: 325 TABLET, FILM COATED ORAL at 19:38

## 2022-02-15 RX ADMIN — ACETAMINOPHEN 975 MG: 325 TABLET, FILM COATED ORAL at 03:42

## 2022-02-15 ASSESSMENT — ACTIVITIES OF DAILY LIVING (ADL)
ADLS_ACUITY_SCORE: 7
PREVIOUS_RESPONSIBILITIES: MEAL PREP;HOUSEKEEPING;LAUNDRY;SHOPPING;MEDICATION MANAGEMENT;FINANCES;DRIVING
ADLS_ACUITY_SCORE: 8
ADLS_ACUITY_SCORE: 7
ADLS_ACUITY_SCORE: 8
ADLS_ACUITY_SCORE: 7
ADLS_ACUITY_SCORE: 7
ADLS_ACUITY_SCORE: 8
ADLS_ACUITY_SCORE: 8
ADLS_ACUITY_SCORE: 7
ADLS_ACUITY_SCORE: 7
ADLS_ACUITY_SCORE: 8
ADLS_ACUITY_SCORE: 7
ADLS_ACUITY_SCORE: 8
ADLS_ACUITY_SCORE: 7
ADLS_ACUITY_SCORE: 7
ADLS_ACUITY_SCORE: 8
ADLS_ACUITY_SCORE: 8
ADLS_ACUITY_SCORE: 7
ADLS_ACUITY_SCORE: 7

## 2022-02-15 NOTE — PROGRESS NOTES
02/15/22 1421   Quick Adds   Type of Visit Initial Occupational Therapy Evaluation   Living Environment   People in home spouse;child(alan), dependent   Current Living Arrangements house   Home Accessibility stairs to enter home   Number of Stairs, Main Entrance 5   Stair Railings, Main Entrance none;railing on left side (ascending)   Number of Stairs, Within Home, Primary 9   Stair Railings, Within Home, Primary railing on left side (ascending)   Transportation Anticipated family or friend will provide   Living Environment Comments Pt's spouse and dtr (developmentally delayed) generally unable to assist at AK.  Son will come on weekends to A with errands/laundry.     Self-Care   Usual Activity Tolerance good   Current Activity Tolerance moderate   Regular Exercise Yes   Activity/Exercise Type strength training;walking   Equipment Currently Used at Home grab bar, tub/shower;shower chair;dressing device   Activity/Exercise/Self-Care Comment Pt reports indep with all I/ADLs at baseline   Instrumental Activities of Daily Living (IADL)   Previous Responsibilities meal prep;housekeeping;laundry;shopping;medication management;finances;driving   IADL Comments Son can A with laundry/errands on weekends; dtr can A with light tasks (microwave meals)   Disability/Function   Fall history within last six months yes   Number of times patient has fallen within last six months 1   Change in Functional Status Since Onset of Current Illness/Injury yes   General Information   Onset of Illness/Injury or Date of Surgery 02/14/22   Referring Physician Karthik Carcamo   Patient/Family Therapy Goal Statement (OT) home   Additional Occupational Profile Info/Pertinent History of Current Problem Pt is 75 yo female s/p R hip IM nail after R hip fracture from fall in home.   Existing Precautions/Restrictions fall   Right Lower Extremity (Weight-bearing Status) weight-bearing as tolerated (WBAT)   General Observations and Info Activity order:  ambulate with assist 3x daily   Cognitive Status Examination   Cognitive Status Comments no cognitive concerns   Pain Assessment   Patient Currently in Pain Yes, see Vital Sign flowsheet   Integumentary/Edema   Integumentary/Edema Comments increased edema RLE   Posture   Posture protracted shoulders   Range of Motion Comprehensive   Comment, General Range of Motion pt demonstrated functional BUE AROM during dressing task; RLE limited by surgical pain   Strength Comprehensive (MMT)   Comment, General Manual Muscle Testing (MMT) Assessment pt demonstrated functional BUE strength during transfers; RLE limited by surgical pain   Bed Mobility   Bed Mobility supine-sit;sit-supine   Supine-Sit [place] (Bed Mobility) minimum assist (75% patient effort)   Sit-Supine [place] (Bed Mobility) minimum assist (75% patient effort)   Transfers   Transfers sit-stand transfer;toilet transfer;shower transfer   Sit-Stand Transfer   Sit-Stand [place] (Transfers) contact guard;verbal cues;supervision;set up   Assistive Device (Sit-Stand Transfers) walker, standard   Shower Transfer   Shower Transfer Comments walk-in shower with grab bars and shower seat   Toilet Transfer   Type (Toilet Transfer) sit-stand;stand-sit   [place] Level (Toilet Transfer) contact guard   Assistive Device (Toilet Transfer) commode chair   Toilet Transfer Comments pt does not have support by toilet at home; uses toilet 3x/night generally   Balance   Balance Comments good seated; good ambulating in room with 2WW   Activities of Daily Living   BADL Assessment lower body dressing   Lower Body Dressing Assessment   [place] Level (Lower Body Dressing) minimum assist (75% patient effort)   Clinical Impression   Criteria for Skilled Therapeutic Interventions Met (OT) yes;meets criteria;skilled treatment is necessary   OT Diagnosis impaired ADLs   OT Problem List-Impairments impacting ADL problems related to;range of motion (ROM);strength;pain    Assessment of Occupational Performance 3-5 Performance Deficits   Identified Performance Deficits dressing, bathing, toilet tx, home chores   Planned Therapy Interventions (OT) ADL retraining;transfer training   Clinical Decision Making Complexity (OT) moderate complexity   Therapy Frequency (OT) Daily   Predicted Duration of Therapy 3 days   Anticipated Equipment Needs Upon Discharge (OT) commode chair   Risk & Benefits of therapy have been explained evaluation/treatment results reviewed;care plan/treatment goals reviewed;risks/benefits reviewed;participants included;patient   OT Discharge Planning    OT Discharge Recommendation (DC Rec) Home with assist   OT Rationale for DC Rec Pt progressing well, anticipate she will be able to discharge to home with family to assist with home chores, once medically stable.   OT Brief overview of current status  Aranza toilet transfer to commode overlay, Aranza WONG dressing   Total Evaluation Time (Minutes)   Total Evaluation Time (Minutes) 5

## 2022-02-15 NOTE — PROGRESS NOTES
Care Transitions Team: Following for CC, discharge planning, and disposition.       Per [facility] Trauma Liaison Handoff:   Writer spoke with Kandis via phone introduced myself and explained my role in her plan of care. Discussed how therapy went and her discharge plan. She is planning on discharging home with home care. Her  will  Not be able to assist her and her daughter is  Developmentally, but can help with small things. Her son and daughter in-law will be able to help with transportation and meals. Writer would appreciate if care coordination team would send home care referrals and find one that her insurance accepts and they will be able to staff her for therapy. Kandis has no preference for Home Care Agency.      Living situation:   Support: Lives with spouse and daughter     Home environment:    Stairs-had rails? From garage 7 steps down - 10 steps to bedroom level    Equipment available : walker, cane and shower chair.   Increase service or equipment needs:  None noted     Prior Function level:   Ambulation Independent    ADL's Independent     Current home care services: None    Family/patient discharge goal: Return to baseline level of function     Barriers to Discharge: Medically stable and moving safely    Discharge Plan of care: Home with Home Care - PT/OT      Orders needed for services: Post Op Plan of Care - Home with Home Care - PT/OT    Weight bearing status and Hip precautions: WBAT with walker   Transportation: Son will provide transportation        Will follow in collaboration with O MAYELA Garcia [phone] [facility] for discharge planning.

## 2022-02-15 NOTE — PLAN OF CARE
Patient vital signs are at baseline: Yes  Patient able to ambulate as they were prior to admission or with assist devices provided by therapies during their stay:  No,  Reason:  up with 1-2 assist  Patient MUST void prior to discharge:  Yes  Patient able to tolerate oral intake:  Yes  Pain has adequate pain control using Oral analgesics:  Yes    Dressing CDI. Up with 1-2 assist, pivots to BSC.  Pain managed with oxy.

## 2022-02-15 NOTE — PROGRESS NOTES
Orthopedic Surgery  Kandischato Tse  2/15/2022  Admit Date:  2022  POD: 1 Day Post-Op   Procedure(s):  RIGHT INTERTROCHANTERIC FEMUR NAILING    Alert and oriented.   Patient resting comfortably in bed.    Pain controlled.  Tolerating oral intake.    Denies nausea or vomiting  Denies chest pain or shortness of breath    Vital Sign Ranges  Temperature Temp  Av.8  F (36.6  C)  Min: 97  F (36.1  C)  Max: 99.4  F (37.4  C)   Blood pressure Systolic (24hrs), Av , Min:100 , Max:142        Diastolic (24hrs), Av, Min:57, Max:72      Pulse Pulse  Av.3  Min: 66  Max: 82   Respirations Resp  Av.9  Min: 8  Max: 21   Pulse oximetry SpO2  Av.2 %  Min: 97 %  Max: 100 %       Dressing is clean, dry, and intact.   Minimal erythema of the surrounding skin.   Bilateral calves are soft, non-tender.  Right lower extremity is NVI.  Sensation intact bilateral lower extremities  Patient able to resist dorsi and plantar flexion bilaterally  +Dp pulse    Labs:  Recent Labs   Lab Test 22  0834 22  1606 19  1506   WBC 5.9 4.9 7.4     Recent Labs   Lab Test 02/15/22  1013 22  0834 22  1606   HGB 9.9* 11.5* 13.0     No results for input(s): INR in the last 10975 hours.  Recent Labs   Lab Test 22  0834 22  1606 21  1843   * 158 163       1. PLAN:   Continue Lovenox for DVT prophylaxis.     Mobilize with PT/OT    WBAT Right LE with walker.     Continue current pain regiment.   Dressings: Keep intact.  Change if >60% saturated or peeling off.     2. Disposition   Anticipate d/c to home with home care likely on Thursday when medically cleared, pain controlled and progressing in PT.    Jovana Price PA-C

## 2022-02-15 NOTE — PROGRESS NOTES
02/15/2022     1900 - 0730  Fell down a flight of stairs at home, R hip fracture .Pt hit elbow & knee during fall, abrasion to R elbow & bruise to R knee.Pt A&Ox4, VSS on 1l o2 NC.  CMS intact, edema to R hip/leg.  Mepliex applied to elbow. Pain managed w/ PRN tylenol, oxycodone regular diet.  L arm infusing LR @ 100mL/hr R arm SL. Pt void *2 using bed commode with assist *1. BS @ 0600 113, not need to monitor BS.

## 2022-02-15 NOTE — PROGRESS NOTES
02/15/22 0913   Quick Adds   Type of Visit Initial PT Evaluation   Living Environment   People in home spouse;child(alan), dependent   Current Living Arrangements house   Home Accessibility stairs to enter home   Number of Stairs, Main Entrance 5   Stair Railings, Main Entrance none;railing on left side (ascending)   Number of Stairs, Within Home, Primary 9   Stair Railings, Within Home, Primary railing on left side (ascending)   Transportation Anticipated family or friend will provide   Living Environment Comments Pt has 1 step (threshold) without a railing that is required to get into the home, then 5 additional steps to get to the main floor which has 1 railing. Pt has access to a FWW and has practice using from previous L ELBA. Pt has additional 9 stairs to get to bedroom and bathroom where she plans to stay the majority of the time.    Self-Care   Usual Activity Tolerance good   Current Activity Tolerance moderate   Regular Exercise Yes   Activity/Exercise Type strength training;walking   Exercise Amount/Frequency 3-5 times/wk   Equipment Currently Used at Home none   Disability/Function   Fall history within last six months yes   Number of times patient has fallen within last six months 1   General Information   Onset of Illness/Injury or Date of Surgery 02/14/22   Referring Physician Karthik Carcamo MD   Patient/Family Therapy Goals Statement (PT) Return home   Pertinent History of Current Problem (include personal factors and/or comorbidities that impact the POC) Pt is 75 yo female s/p R hip IM nail after R hip fracture from fall in home.   Existing Precautions/Restrictions fall   Weight-Bearing Status - RLE weight-bearing as tolerated   Cognition   Orientation Status (Cognition) oriented x 4   Posture    Posture Not impaired   Range of Motion (ROM)   ROM Quick Adds ROM WFL   ROM Comment Not formally assessed, but pt demonstrates ROM WFL during functional mobility and while performing STS from  bed.    Strength   Manual Muscle Testing Quick Adds Strength WFL   Strength Comments Pt performs good quad set and SAQ independently. Pt demonstrates functional strength during functional mobility   Bed Mobility   Bed Mobility supine-sit   Supine-Sit [place] (Bed Mobility) modified independence   Bed Mobility Limitations decreased ability to use legs for bridging/pushing   Impairments Contributing to Impaired Bed Mobility pain   Assistive Device (Bed Mobility) bed rails   Transfers   Transfers sit-stand transfer   Transfer Safety Concerns Noted other (see comments)   Impairments Contributing to Impaired Transfers pain   Transfer Safety Comments Pt has one small knee buckle during STS but was able to self recover. PT offering CGA throuhgout.    Gait/Stairs (Locomotion)   [place] Level (Gait) contact guard   Assistive Device (Gait) walker, front-wheeled   Distance in Feet (Required for LE Total Joints) 15' x2  (5' for evaluation, remainder as treatment )   Pattern (Gait) step-to   Deviations/Abnormal Patterns (Gait) gait speed decreased;weight shifting decreased;antalgic   Maintains Weight-bearing Status (Gait) able to maintain   Comment (Gait/Stairs) Pt did not perform stairs on this date, but will need to practice prior to discharge given home setup.    Balance   Balance no deficits were identified   Sensory Examination   Sensory Perception WFL   Clinical Impression   Criteria for Skilled Therapeutic Intervention yes, treatment indicated   PT Diagnosis (PT) Difficulty with ambulation   Influenced by the following impairments pain, decreased strength, decreased ROM   Functional limitations due to impairments difficulty with functional mobility    Clinical Presentation Stable/Uncomplicated   Clinical Presentation Rationale Based on clinical judgement and patient's PMH.    Clinical Decision Making (Complexity) low complexity   Therapy Frequency (PT) Daily   Predicted Duration of Therapy Intervention  (days/wks) 4   Planned Therapy Interventions (PT) bed mobility training;gait training;stair training;strengthening;ROM (range of motion);transfer training   Anticipated Equipment Needs at Discharge (PT) walker, rolling   Risk & Benefits of therapy have been explained patient   PT Discharge Planning    PT Discharge Recommendation (DC Rec) home with assist;home with home care physical therapy;Transitional Care Facility   PT Rationale for DC Rec Pt is below baseline currently and requires FWW and CGA for transfers and ambulation. Pt has signficant amount of stairs to navigate at home and family members are unable to provide manual assistance. Pt will benefit from HHPT to further increase mobility and safety and functional endurance. Pt will require assist of one, assistive device, and considerable effort to come and go from house, therefore, home health PT is recommended.    PT Brief overview of current status  bed mobility with mod I, CGA for STS transfers and ambulation.    Total Evaluation Time   Total Evaluation Time (Minutes) 10

## 2022-02-15 NOTE — PLAN OF CARE
Pt is A&O x4. Back from PACU at 3:40 pm. 1L o2 on 98%. Due to void. Aquacel dressing on right hip CDI. CMS intact. LR infusing at 100 ml/hr.  Will continue to monitor.

## 2022-02-15 NOTE — PROGRESS NOTES
[facility]    Medicine Progress Note - Hospitalist Service       Date of Admission:  2/13/2022    Assessment & Plan   Kandis Tse is a 76 year old female with hx of HTN and depression/anxiety who was admitted on 2/13/2022 for a right hip fracture following a fall. She is s/p uncomplicated ORIF     Acute right hip fracture following a fall s/p ORIF on 2/14/2022  Missed a step while walking down the stairs. XR pelvis and right hip on arrival revealed a comminuted intertrochanteric fracture of the right hip. Ortho was consulted and patient underwent uncomplicated ORIF by Dr. INO Carcamo on 2/14.   - Post-op cares and VTE prophylaxis as per Ortho  - PT/OT assessments pending    Acute blood loss anemia due to surgery  - Hgb 11.5 from 13 prior to surgery, will follow     Essential hypertension  - Continues on PTA carvedilol     Major recurrent depressive disorder and anxiety  - Continues on PTA citalopram     Diet: Advance Diet as Tolerated: Regular Diet Adult    DVT Prophylaxis: Pneumatic Compression Devices  Whiteside Catheter: Not present  Code Status: Full Code         Disposition: Expected discharge once cleared by Ortho and PT/OT, 1-2 more days    The patient's care was discussed with the Patient.    Phyllis Mortensen MD  Hospitalist Service  [facility]  Contact information available via [facility] Paging/Directory    ______________________________________________________________________    Interval History    No events overnight. Having what she believes to be right leg spasms. Otherwise offers no complaints. Denies cp/sob, dizziness/lightheadedness, or nausea. PT/OT assessments pending    Data reviewed today: I reviewed all medications, new labs and imaging results over the last 24 hours. I personally reviewed no images or EKG's today.    Physical Exam   Vital Signs: Temp: 97.8  F (36.6  C) Temp src: Oral BP: 126/67 Pulse: 78   Resp: 16 SpO2: 99 % O2 Device:  Nasal cannula Oxygen Delivery: 1 LPM  Weight: 140 lbs 0 oz  Constitutional: Appears comfortable  HEENT: Sclera white, MMM  Respiratory: Breathing non-labored. Lungs CTAB - no wheezes, crackles, or rhonchi  Cardiovascular: Heart RRR, no m/r/g. No pedal edema  GI: +BS, abd soft/NT  Skin/Integument: No rash  Musculoskeletal: Normal muscle bulk and tone  Neuro: Alert and appropriate, RAJAN  Psych: Calm and cooperative    Data   Recent Labs   Lab 02/15/22  0540 02/14/22  0834 02/13/22  1606   WBC  --  5.9 4.9   HGB  --  11.5* 13.0   MCV  --  102* 101*   PLT  --  138* 158   NA  --  138 140   POTASSIUM  --  3.6 3.5   CHLORIDE  --  106 106   CO2  --  26 28   BUN  --  21 20   CR  --  0.57 0.69   ANIONGAP  --  6 6   GAVIN  --  8.4* 8.7   * 97 91         Recent Results (from the past 24 hour(s))   XR Surgery SUE L/T 5 Min Fluoro w Stills    Narrative    XR SURGERY C-ARM FLUOROSCOPY LESS THAN FIVE MINUTES WITH STILLS   2/14/2022 2:00 PM     HISTORY: Open reduction internal fixation (ORIF) right hip.     C-Arm #1. [id number]. 1 min and 6 sec fluoro time. 6 images. OR 33.  SKS/AC.    NUMBER OF IMAGES ACQUIRED: 6    FLUOROSCOPY TIME: 1.1 minute      Impression    IMPRESSION: Six intraoperative fluoroscopic spot images are provided  for review during reduction and nailing of right proximal femur  fracture with antegrade intramedullary nail and both proximal and  distal screws. See operative summary if further details are needed.    MIMA HARDING MD         SYSTEM ID:  [id number]       Medications     lactated ringers Stopped (02/15/22 0848)     sodium chloride 100 mL/hr at 02/14/22 0009       acetaminophen  975 mg Oral Q8H     aprepitant  40 mg Oral Daily     aspirin  81 mg Oral Daily     carvedilol  6.25 mg Oral BID w/meals     celecoxib  100 mg Oral BID     citalopram  20 mg Oral Daily     enoxaparin ANTICOAGULANT  40 mg Subcutaneous Q24H     famotidine  20 mg Oral BID    Or     famotidine  20 mg Intravenous BID      polyethylene glycol  17 g Oral Daily     senna-docusate  1 tablet Oral BID     sodium chloride (PF)  3 mL Intracatheter Q8H     sodium chloride (PF)  3 mL Intracatheter Q8H

## 2022-02-16 ENCOUNTER — APPOINTMENT (OUTPATIENT)
Dept: PHYSICAL THERAPY | Facility: CLINIC | Age: 77
DRG: 481 | End: 2022-02-16
Payer: COMMERCIAL

## 2022-02-16 ENCOUNTER — APPOINTMENT (OUTPATIENT)
Dept: OCCUPATIONAL THERAPY | Facility: CLINIC | Age: 77
DRG: 481 | End: 2022-02-16
Payer: COMMERCIAL

## 2022-02-16 ENCOUNTER — TELEPHONE (OUTPATIENT)
Dept: PEDIATRICS | Facility: CLINIC | Age: 77
End: 2022-02-16
Payer: COMMERCIAL

## 2022-02-16 LAB — HGB BLD-MCNC: 9.7 G/DL (ref 11.7–15.7)

## 2022-02-16 PROCEDURE — 250N000013 HC RX MED GY IP 250 OP 250 PS 637: Performed by: ORTHOPAEDIC SURGERY

## 2022-02-16 PROCEDURE — 250N000011 HC RX IP 250 OP 636: Performed by: ORTHOPAEDIC SURGERY

## 2022-02-16 PROCEDURE — 97535 SELF CARE MNGMENT TRAINING: CPT | Mod: GO | Performed by: OCCUPATIONAL THERAPIST

## 2022-02-16 PROCEDURE — 97110 THERAPEUTIC EXERCISES: CPT | Mod: GP | Performed by: PHYSICAL THERAPIST

## 2022-02-16 PROCEDURE — 97116 GAIT TRAINING THERAPY: CPT | Mod: GP | Performed by: PHYSICAL THERAPIST

## 2022-02-16 PROCEDURE — 99232 SBSQ HOSP IP/OBS MODERATE 35: CPT | Performed by: INTERNAL MEDICINE

## 2022-02-16 PROCEDURE — 85018 HEMOGLOBIN: CPT | Performed by: ORTHOPAEDIC SURGERY

## 2022-02-16 PROCEDURE — 36415 COLL VENOUS BLD VENIPUNCTURE: CPT | Performed by: ORTHOPAEDIC SURGERY

## 2022-02-16 PROCEDURE — 120N000001 HC R&B MED SURG/OB

## 2022-02-16 RX ORDER — ACETAMINOPHEN 325 MG/1
650 TABLET ORAL EVERY 4 HOURS PRN
Qty: 30 TABLET | Refills: 0 | Status: SHIPPED | OUTPATIENT
Start: 2022-02-17 | End: 2022-04-25

## 2022-02-16 RX ORDER — AMOXICILLIN 250 MG
1 CAPSULE ORAL 2 TIMES DAILY PRN
Qty: 20 TABLET | Refills: 0 | Status: SHIPPED | OUTPATIENT
Start: 2022-02-16 | End: 2022-04-25

## 2022-02-16 RX ORDER — OXYCODONE HYDROCHLORIDE 5 MG/1
5-10 TABLET ORAL EVERY 4 HOURS PRN
Qty: 30 TABLET | Refills: 0 | Status: SHIPPED | OUTPATIENT
Start: 2022-02-16 | End: 2022-04-25

## 2022-02-16 RX ORDER — ONDANSETRON 4 MG/1
4 TABLET, ORALLY DISINTEGRATING ORAL EVERY 6 HOURS PRN
Qty: 8 TABLET | Refills: 0 | Status: SHIPPED | OUTPATIENT
Start: 2022-02-16 | End: 2022-04-25

## 2022-02-16 RX ADMIN — OXYCODONE HYDROCHLORIDE 10 MG: 5 TABLET ORAL at 20:56

## 2022-02-16 RX ADMIN — SENNOSIDES AND DOCUSATE SODIUM 1 TABLET: 50; 8.6 TABLET ORAL at 08:52

## 2022-02-16 RX ADMIN — ENOXAPARIN SODIUM 40 MG: 40 INJECTION SUBCUTANEOUS at 12:41

## 2022-02-16 RX ADMIN — CELECOXIB 100 MG: 100 CAPSULE ORAL at 08:52

## 2022-02-16 RX ADMIN — ACETAMINOPHEN 975 MG: 325 TABLET, FILM COATED ORAL at 12:41

## 2022-02-16 RX ADMIN — OXYCODONE HYDROCHLORIDE 5 MG: 5 TABLET ORAL at 04:12

## 2022-02-16 RX ADMIN — SENNOSIDES AND DOCUSATE SODIUM 1 TABLET: 50; 8.6 TABLET ORAL at 20:56

## 2022-02-16 RX ADMIN — POLYETHYLENE GLYCOL 3350 17 G: 17 POWDER, FOR SOLUTION ORAL at 08:50

## 2022-02-16 RX ADMIN — ASPIRIN 81 MG CHEWABLE TABLET 81 MG: 81 TABLET CHEWABLE at 08:50

## 2022-02-16 RX ADMIN — FAMOTIDINE 20 MG: 20 TABLET ORAL at 20:56

## 2022-02-16 RX ADMIN — OXYCODONE HYDROCHLORIDE 10 MG: 5 TABLET ORAL at 12:41

## 2022-02-16 RX ADMIN — ACETAMINOPHEN 975 MG: 325 TABLET, FILM COATED ORAL at 04:12

## 2022-02-16 RX ADMIN — CARVEDILOL 6.25 MG: 3.12 TABLET, FILM COATED ORAL at 08:51

## 2022-02-16 RX ADMIN — ACETAMINOPHEN 975 MG: 325 TABLET, FILM COATED ORAL at 20:56

## 2022-02-16 RX ADMIN — FAMOTIDINE 20 MG: 20 TABLET ORAL at 08:52

## 2022-02-16 RX ADMIN — CITALOPRAM HYDROBROMIDE 20 MG: 20 TABLET ORAL at 08:52

## 2022-02-16 RX ADMIN — OXYCODONE HYDROCHLORIDE 5 MG: 5 TABLET ORAL at 08:52

## 2022-02-16 ASSESSMENT — ACTIVITIES OF DAILY LIVING (ADL)
ADLS_ACUITY_SCORE: 10
ADLS_ACUITY_SCORE: 10
ADLS_ACUITY_SCORE: 7
ADLS_ACUITY_SCORE: 7
ADLS_ACUITY_SCORE: 10
ADLS_ACUITY_SCORE: 9
ADLS_ACUITY_SCORE: 7
ADLS_ACUITY_SCORE: 7
ADLS_ACUITY_SCORE: 10
ADLS_ACUITY_SCORE: 10
ADLS_ACUITY_SCORE: 7
ADLS_ACUITY_SCORE: 9
ADLS_ACUITY_SCORE: 7
ADLS_ACUITY_SCORE: 10
ADLS_ACUITY_SCORE: 7
DEPENDENT_IADLS:: INDEPENDENT
ADLS_ACUITY_SCORE: 7
ADLS_ACUITY_SCORE: 10
ADLS_ACUITY_SCORE: 10
ADLS_ACUITY_SCORE: 9
ADLS_ACUITY_SCORE: 7
ADLS_ACUITY_SCORE: 10
ADLS_ACUITY_SCORE: 7

## 2022-02-16 NOTE — PROGRESS NOTES
Orthopedic Surgery  Kandischato Tse  2022  Admit Date:  2022  POD: 2 Days Post-Op   Procedure(s):  RIGHT INTERTROCHANTERIC FEMUR NAILING    Alert and oriented.   Patient resting comfortably in bed.    Pain controlled.  Had difficulty with stairs in PT today.   Tolerating oral intake.    Denies nausea or vomiting  Denies chest pain or shortness of breath    Vital Sign Ranges  Temperature Temp  Av.9  F (36.6  C)  Min: 97.7  F (36.5  C)  Max: 98.1  F (36.7  C)   Blood pressure Systolic (24hrs), Av , Min:92 , Max:118        Diastolic (24hrs), Av, Min:52, Max:57      Pulse Pulse  Av  Min: 68  Max: 87   Respirations Resp  Av  Min: 16  Max: 16   Pulse oximetry SpO2  Av.3 %  Min: 90 %  Max: 95 %       Dressing is clean, dry, and intact.   Minimal erythema of the surrounding skin.   Bilateral calves are soft, non-tender.  Right lower extremity is NVI.  Sensation intact bilateral lower extremities  Patient able to resist dorsi and plantar flexion bilaterally  +Dp pulse    Labs:  Recent Labs   Lab Test 22  0834 22  1606 19  1506   WBC 5.9 4.9 7.4     Recent Labs   Lab Test 22  0728 02/15/22  1013 22  0834   HGB 9.7* 9.9* 11.5*     No results for input(s): INR in the last 59881 hours.  Recent Labs   Lab Test 22  0834 22  1606 21  1843   * 158 163     1. Plan:              Continue Lovenox for DVT prophylaxis.  ASA at discharge.               Mobilize with PT/OT               WBAT Right LE with walker.                Continue current pain regiment.              Dressings: Keep intact.  Change if >60% saturated or peeling off.      2. Disposition              Anticipate d/c to home with home care 1-2 day when medically cleared, pain controlled and progressing in PT.     Jovana Price PA-C

## 2022-02-16 NOTE — PROGRESS NOTES
[facility]    Medicine Progress Note - Hospitalist Service       Date of Admission:  2/13/2022    Assessment & Plan   Kandis Tse is a 76 year old female with hx of HTN and depression/anxiety who was admitted on 2/13/2022 for a right hip fracture following a fall. She is s/p uncomplicated ORIF     Acute right hip fracture following a fall s/p ORIF on 2/14/2022  Missed a step while walking down the stairs. XR pelvis and right hip on arrival revealed a comminuted intertrochanteric fracture of the right hip. Ortho was consulted and patient underwent uncomplicated ORIF by Dr. INO Carcamo on 2/14.   - Post-op cares as per Ortho  - WBAT RLE with walker  - On enoxaparin for VTE ppx as per Ortho  - Pain controlled with APAP 975 mg TID, oxycodone prn  - PT currently recommending HHPT    Acute blood loss anemia due to surgery  Hgb prior to surgery 13. Decreased to mid-9 range post surgery     Essential hypertension  - Continues on PTA carvedilol     Major recurrent depressive disorder and anxiety  - Continues on PTA citalopram     Diet: Advance Diet as Tolerated: Regular Diet Adult    DVT Prophylaxis: Pneumatic Compression Devices  Whiteside Catheter: Not present  Code Status: Full Code         Disposition: Expected discharge home tomorrow once cleared by Ortho, PT/OT. Patient has a lot of stairs in her home, and is a primary caregiver for her ; also has a special needs daughter. Still having some limitations with mobility, will likely be ready tomorrow    The patient's care was discussed with the Patient.    Phyllis Mortensen MD  Hospitalist Service  [facility]  Contact information available via [facility] Paging/Directory    ______________________________________________________________________    Interval History    No events overnight. R hip/RLE pain adequately controlled with current pain regimen, but still having some limitations with mobility. Denies cp/sob,  dizziness/lightheadedness, or nausea.    Data reviewed today: I reviewed all medications, new labs and imaging results over the last 24 hours. I personally reviewed no images or EKG's today.    Physical Exam   Vital Signs: Temp: (P) 98.1  F (36.7  C) Temp src: (P) Axillary BP: (P) 118/57 Pulse: (P) 87   Resp: (P) 16 SpO2: (P) 90 % O2 Device: (P) None (Room air) Oxygen Delivery: 1 LPM  Weight: 140 lbs 0 oz  Constitutional: Appears comfortable  HEENT: Sclera white, MMM  Respiratory: Breathing non-labored.  Skin/Integument: No rash  Neuro: Alert and appropriate, RAJAN  Psych: Calm and cooperative    Data   Recent Labs   Lab 02/16/22  0728 02/15/22  1013 02/15/22  0540 02/14/22  0834 02/13/22  1606   WBC  --   --   --  5.9 4.9   HGB 9.7* 9.9*  --  11.5* 13.0   MCV  --   --   --  102* 101*   PLT  --   --   --  138* 158   NA  --   --   --  138 140   POTASSIUM  --   --   --  3.6 3.5   CHLORIDE  --   --   --  106 106   CO2  --   --   --  26 28   BUN  --   --   --  21 20   CR  --   --   --  0.57 0.69   ANIONGAP  --   --   --  6 6   GAVIN  --   --   --  8.4* 8.7   GLC  --   --  113* 97 91         No results found for this or any previous visit (from the past 24 hour(s)).    Medications     sodium chloride 100 mL/hr at 02/14/22 0009       acetaminophen  975 mg Oral Q8H     aprepitant  40 mg Oral Daily     aspirin  81 mg Oral Daily     carvedilol  6.25 mg Oral BID w/meals     citalopram  20 mg Oral Daily     enoxaparin ANTICOAGULANT  40 mg Subcutaneous Q24H     famotidine  20 mg Oral BID    Or     famotidine  20 mg Intravenous BID     polyethylene glycol  17 g Oral Daily     senna-docusate  1 tablet Oral BID     sodium chloride (PF)  3 mL Intracatheter Q8H     sodium chloride (PF)  3 mL Intracatheter Q8H

## 2022-02-16 NOTE — CONSULTS
"Care Management Initial Consult    General Information  Assessment completed with: Patient,    Type of CM/SW Visit: Initial Assessment    Primary Care Provider verified and updated as needed: Yes   Readmission within the last 30 days: no previous admission in last 30 days      Reason for Consult: discharge planning        Communication Assessment  Patient's communication style: spoken language (English or Bilingual)    Hearing Difficulty or Deaf: no   Wear Glasses or Blind: yes    Cognitive  Cognitive/Neuro/Behavioral: WDL     Arousal Level: arouses to voice                Living Environment:   People in home: child(alan), adult, spouse     Current living Arrangements: house      Able to return to prior arrangements: yes       Family/Social Support:  Care provided by: self  Provides care for: child(alan), other (see comments) (daughter is disabled)  Marital Status:   , Children          Description of Support System: Supportive, Involved    Support Assessment: Adequate family and caregiver support    Current Resources:   Patient receiving home care services: No  Community Resources: None  Equipment currently used at home: grab bar, tub/shower, shower chair, walker, standard    Employment/Financial:  Employment Status: retired        Financial Concerns:     Referral to Financial Worker: No    Functional Status:  Prior to admission patient needed assistance:   Dependent ADLs:: Independent  Dependent IADLs:: Independent    Mental Health Status:  Mental Health Status: No Current Concerns       Chemical Dependency Status:  Chemical Dependency Status: No Current Concerns             Values/Beliefs:  Spiritual, Cultural Beliefs, Congregational Practices, Values that affect care: no               Additional Information:  RNMAYELA met with pt for discharge planning. Pt lives in [place] with her spouse and disabled daughter; pt reports \"I am the caregiver of the family.\" Their home has 7 steps from garage to main level and " 10 steps from main level to bedroom level. Says her spouse has difficulty with stairs and they are considering a move in their near future to a home with one level. Grab bars in shower along with shower chair. Reports she will have an elevated toilet seat. Has an elevated bed.    Discussed conversation with Florecita MINOR regarding home care. Pt agrees she would greatly benefit from home care because Her  will not be able to assist her and her daughter is disabled, but can help with small things. Her son and daughter in-law will be able to help with transportation and meals.     [facility] PT/OT referral sent and accepted for SOC this weekend with discharge planned for tomorrow 2/17. RNCM to update [facility] if this plan changes.    PCP f/u made for 2/25 (pt says spouse or son will provide transportation)      Addendum: Pt still having some limitations with mobility. Discharge now planned for tomorrow 2/18 so pt can work with PT one more day. [facility] emailed and update regarding discharge delay.      Amy Levy, RN, BSN, PHN, CMSRN  Care Coordination  [facility]  Phone [phone]

## 2022-02-16 NOTE — PLAN OF CARE
Goal Outcome Evaluation:    Plan of Care Reviewed With: patient    Patient vital signs are at baseline: Yes  Patient able to ambulate as they were prior to admission or with assist devices provided by therapies during their stay:  Yes  Patient MUST void prior to discharge:  Yes  Patient able to tolerate oral intake:  Yes  Pain has adequate pain control using Oral analgesics:  Yes    Dressing with scant drainage. Pain managed with oxy. Up with assist of 1.  Possible discharge tomorrow.

## 2022-02-16 NOTE — PLAN OF CARE
Patient vital signs are at baseline: Yes  Patient able to ambulate as they were prior to admission or with assist devices provided by therapies during their stay:  Yes, Assist 1 GB/walker  Patient MUST void prior to discharge:  Yes  Patient able to tolerate oral intake:  Yes  Pain has adequate pain control using Oral analgesics:  Yes, PRN oxycodone, scheduled tylenol.    Pt A&Ox4. Cms intact. IV saline lock. Dressing CDI. Reg diet. VSS- RA. Continue to monitor

## 2022-02-16 NOTE — DISCHARGE INSTRUCTIONS
HOMECARE NOTE:   Your doctor has ordered home care to help you after your hospital stay.  The staff will contact you to schedule your first visit.  This service will be provided by [facility].  If you have any question, or have not received a call within 48 hours of discharge, please call them at [phone] or [phone]. *please see homecare quality ratings for all homecares in your area at www.medicare.gov/care-compare

## 2022-02-16 NOTE — TELEPHONE ENCOUNTER
Ginger from [facility], [phone], requested verbal confirmation that Dr. Conde will follow patient for her home care services. Ginger stated her voicemail is secure and a vm can be left.    Nae Brice on 2/16/2022 at 1:17 PM

## 2022-02-17 ENCOUNTER — APPOINTMENT (OUTPATIENT)
Dept: PHYSICAL THERAPY | Facility: CLINIC | Age: 77
DRG: 481 | End: 2022-02-17
Payer: COMMERCIAL

## 2022-02-17 LAB — PLATELET # BLD AUTO: 148 10E3/UL (ref 150–450)

## 2022-02-17 PROCEDURE — 85049 AUTOMATED PLATELET COUNT: CPT | Performed by: INTERNAL MEDICINE

## 2022-02-17 PROCEDURE — 97530 THERAPEUTIC ACTIVITIES: CPT | Mod: GP | Performed by: PHYSICAL THERAPIST

## 2022-02-17 PROCEDURE — 250N000011 HC RX IP 250 OP 636: Performed by: ORTHOPAEDIC SURGERY

## 2022-02-17 PROCEDURE — 120N000001 HC R&B MED SURG/OB

## 2022-02-17 PROCEDURE — 36415 COLL VENOUS BLD VENIPUNCTURE: CPT | Performed by: INTERNAL MEDICINE

## 2022-02-17 PROCEDURE — 97116 GAIT TRAINING THERAPY: CPT | Mod: GP | Performed by: PHYSICAL THERAPIST

## 2022-02-17 PROCEDURE — 99231 SBSQ HOSP IP/OBS SF/LOW 25: CPT | Performed by: HOSPITALIST

## 2022-02-17 PROCEDURE — 250N000013 HC RX MED GY IP 250 OP 250 PS 637: Performed by: ORTHOPAEDIC SURGERY

## 2022-02-17 RX ADMIN — ENOXAPARIN SODIUM 40 MG: 40 INJECTION SUBCUTANEOUS at 12:19

## 2022-02-17 RX ADMIN — ACETAMINOPHEN 975 MG: 325 TABLET, FILM COATED ORAL at 04:50

## 2022-02-17 RX ADMIN — ASPIRIN 81 MG CHEWABLE TABLET 81 MG: 81 TABLET CHEWABLE at 08:53

## 2022-02-17 RX ADMIN — FAMOTIDINE 20 MG: 20 TABLET ORAL at 08:53

## 2022-02-17 RX ADMIN — CARVEDILOL 6.25 MG: 3.12 TABLET, FILM COATED ORAL at 17:53

## 2022-02-17 RX ADMIN — POLYETHYLENE GLYCOL 3350 17 G: 17 POWDER, FOR SOLUTION ORAL at 08:53

## 2022-02-17 RX ADMIN — OXYCODONE HYDROCHLORIDE 5 MG: 5 TABLET ORAL at 08:59

## 2022-02-17 RX ADMIN — SENNOSIDES AND DOCUSATE SODIUM 1 TABLET: 50; 8.6 TABLET ORAL at 08:53

## 2022-02-17 RX ADMIN — ACETAMINOPHEN 975 MG: 325 TABLET, FILM COATED ORAL at 12:19

## 2022-02-17 RX ADMIN — OXYCODONE HYDROCHLORIDE 5 MG: 5 TABLET ORAL at 20:58

## 2022-02-17 RX ADMIN — CARVEDILOL 6.25 MG: 3.12 TABLET, FILM COATED ORAL at 08:53

## 2022-02-17 RX ADMIN — SENNOSIDES AND DOCUSATE SODIUM 1 TABLET: 50; 8.6 TABLET ORAL at 20:58

## 2022-02-17 RX ADMIN — FAMOTIDINE 20 MG: 20 TABLET ORAL at 20:58

## 2022-02-17 RX ADMIN — CITALOPRAM HYDROBROMIDE 20 MG: 20 TABLET ORAL at 08:53

## 2022-02-17 ASSESSMENT — ACTIVITIES OF DAILY LIVING (ADL)
ADLS_ACUITY_SCORE: 10
ADLS_ACUITY_SCORE: 10
ADLS_ACUITY_SCORE: 11
ADLS_ACUITY_SCORE: 10
ADLS_ACUITY_SCORE: 11
ADLS_ACUITY_SCORE: 10
ADLS_ACUITY_SCORE: 11
ADLS_ACUITY_SCORE: 10
ADLS_ACUITY_SCORE: 10
ADLS_ACUITY_SCORE: 11
ADLS_ACUITY_SCORE: 10
ADLS_ACUITY_SCORE: 11
ADLS_ACUITY_SCORE: 12
ADLS_ACUITY_SCORE: 10
ADLS_ACUITY_SCORE: 12
ADLS_ACUITY_SCORE: 12
ADLS_ACUITY_SCORE: 11

## 2022-02-17 NOTE — PROGRESS NOTES
Plan of Care Reviewed With: patient    Patient vital signs are at baseline: Yes  Patient able to ambulate as they were prior to admission or with assist devices provided by therapies during their stay:  Yes  Patient MUST void prior to discharge:  Yes  Patient able to tolerate oral intake:  Yes  Pain has adequate pain control using Oral analgesics:  Yes    Pt is alert and oriented x4, VSS, CMS intact. Dressing has scant amount of drainage. Pain managed with oxy and tylenol (See MAR). Slept throughout night w/o difficulty. Voiding without difficulty. Possible discharge today pending PT/stairs.

## 2022-02-17 NOTE — PROGRESS NOTES
Orthopedic Surgery  Kandischato Tse  2022  Admit Date:  2022  POD: 3 Days Post-Op   Procedure(s):  RIGHT INTERTROCHANTERIC FEMUR NAILING    Alert and oriented.   Patient resting comfortably in bed.    Pain controlled.  Tolerating oral intake.    Denies nausea or vomiting  Denies chest pain or shortness of breath  Was able to do some stairs today with PT     Vital Sign Ranges  Temperature Temp  Av.1  F (36.7  C)  Min: 98  F (36.7  C)  Max: 98.2  F (36.8  C)   Blood pressure Systolic (24hrs), Av , Min:89 , Max:123        Diastolic (24hrs), Av, Min:48, Max:62      Pulse Pulse  Av.4  Min: 73  Max: 79   Respirations Resp  Av  Min: 16  Max: 16   Pulse oximetry SpO2  Av %  Min: 91 %  Max: 97 %       Dressing is clean, dry, and intact with very scant drainage.   Minimal erythema of the surrounding skin.   Bilateral calves are soft, non-tender.  Right lower extremity is NVI.  Sensation intact bilateral lower extremities  Patient able to resist dorsi and plantar flexion bilaterally  +Dp pulse    Labs:  Recent Labs   Lab Test 22  0834 22  1606 19  1506   WBC 5.9 4.9 7.4     Recent Labs   Lab Test 22  0728 02/15/22  1013 22  0834   HGB 9.7* 9.9* 11.5*     No results for input(s): INR in the last 14436 hours.  Recent Labs   Lab Test 22  0839 22  0834 22  1606   * 138* 158     1. Plan:              Continue Lovenox for DVT prophylaxis.  ASA at discharge.               Mobilize with PT/OT               WBAT Right LE with walker.                Continue current pain regiment.              Dressings: Keep intact.  Change if >60% saturated or peeling off.      2. Disposition              Anticipate d/c to home with home care tomorrow when medically cleared, pain controlled and progressing in PT.    Jovana Price PA-C

## 2022-02-17 NOTE — PROGRESS NOTES
[facility]  Medicine Progress Note - Hospitalist Service       Date of Admission:  2/13/2022    Assessment & Plan      Kandis Tse is a 76 year old female with hx of HTN and depression/anxiety who was admitted on 2/13/2022 for a right hip fracture following a fall. She is s/p uncomplicated ORIF     Acute right hip fracture following a fall s/p ORIF on 2/14/2022  Missed a step while walking down the stairs. XR pelvis and right hip on arrival revealed a comminuted intertrochanteric fracture of the right hip. Ortho was consulted and patient underwent uncomplicated ORIF by Dr. INO Carcamo on 2/14.   - Post-op cares as per Ortho  - WBAT RLE with walker  - On enoxaparin for VTE ppx as per Ortho  - Pain controlled with APAP 975 mg TID, oxycodone prn  - PT currently recommending HHPT, patient will continue to work with PT for 1 more day to monitor progress in mibility for safe discharge planning    Acute blood loss anemia due to surgery  Hgb prior to surgery 13. Decreased to mid-9 range post surgery     Essential hypertension  - Continues on PTA carvedilol     Major recurrent depressive disorder and anxiety  - Continues on PTA citalopram     Diet: Advance Diet as Tolerated: Regular Diet Adult    DVT Prophylaxis: Pneumatic Compression Devices  Whiteside Catheter: Not present  Code Status: Full Code         Disposition: Expected discharge home 1/18 once cleared by Ortho, PT/OT. Patient has a lot of stairs in her home, and is a primary caregiver for her ; also has a special needs daughter. Still having some limitations with mobility, will likely be ready tomorrow.    The patient's care was discussed with the Patient and orthopedic surgery team    Padmini Holguin MD  Hospitalist Service  [facility]  Contact information available via [facility] Paging/Directory    ______________________________________________________________________    Interval History      Discussed  with nursing staff and evaluated patient.  No acute issues.  Postop pain adequately controlled with current regimen although continues to have some limitations with mobility.  Denies chest pain, shortness of breath, nausea, dizziness, diarrhea.    Data reviewed today: I reviewed all medications, new labs and imaging results over the last 24 hours. I personally reviewed no images or EKG's today.    Physical Exam   Vital Signs: Temp: 98  F (36.7  C) Temp src: Oral BP: 123/62 Pulse: 79   Resp: 16 SpO2: 94 % O2 Device: None (Room air)    Weight: 140 lbs 0 oz     Constitutional: Appears comfortable  HEENT: Sclera white, MMM  Respiratory: Breathing non-labored.  Skin/Integument: No rash  Neuro: Alert and appropriate, RAJAN  Psych: Calm and cooperative    Data   Recent Labs   Lab 02/17/22  0839 02/16/22  0728 02/15/22  1013 02/15/22  0540 02/14/22  0834 02/13/22  1606   WBC  --   --   --   --  5.9 4.9   HGB  --  9.7* 9.9*  --  11.5* 13.0   MCV  --   --   --   --  102* 101*   *  --   --   --  138* 158   NA  --   --   --   --  138 140   POTASSIUM  --   --   --   --  3.6 3.5   CHLORIDE  --   --   --   --  106 106   CO2  --   --   --   --  26 28   BUN  --   --   --   --  21 20   CR  --   --   --   --  0.57 0.69   ANIONGAP  --   --   --   --  6 6   GAVIN  --   --   --   --  8.4* 8.7   GLC  --   --   --  113* 97 91         No results found for this or any previous visit (from the past 24 hour(s)).    Medications     sodium chloride 100 mL/hr at 02/14/22 0009       aprepitant  40 mg Oral Daily     aspirin  81 mg Oral Daily     carvedilol  6.25 mg Oral BID w/meals     citalopram  20 mg Oral Daily     enoxaparin ANTICOAGULANT  40 mg Subcutaneous Q24H     famotidine  20 mg Oral BID    Or     famotidine  20 mg Intravenous BID     polyethylene glycol  17 g Oral Daily     senna-docusate  1 tablet Oral BID     sodium chloride (PF)  3 mL Intracatheter Q8H     sodium chloride (PF)  3 mL Intracatheter Q8H

## 2022-02-17 NOTE — PLAN OF CARE
Goal Outcome Evaluation:    Plan of Care Reviewed With: patient      Patient vital signs are at baseline: Yes  Patient able to ambulate as they were prior to admission or with assist devices provided by therapies during their stay:  Yes  Patient MUST void prior to discharge:  Yes  Patient able to tolerate oral intake:  Yes  Pain has adequate pain control using Oral analgesics:  Yes    Dressing unchanged.  Pain managed with oxy. Up with 1 and walker.  Discharge home tomorrow.

## 2022-02-18 ENCOUNTER — APPOINTMENT (OUTPATIENT)
Dept: PHYSICAL THERAPY | Facility: CLINIC | Age: 77
DRG: 481 | End: 2022-02-18
Payer: COMMERCIAL

## 2022-02-18 ENCOUNTER — APPOINTMENT (OUTPATIENT)
Dept: OCCUPATIONAL THERAPY | Facility: CLINIC | Age: 77
DRG: 481 | End: 2022-02-18
Payer: COMMERCIAL

## 2022-02-18 LAB
CREAT SERPL-MCNC: 0.6 MG/DL (ref 0.52–1.04)
GFR SERPL CREATININE-BSD FRML MDRD: >90 ML/MIN/1.73M2

## 2022-02-18 PROCEDURE — 250N000012 HC RX MED GY IP 250 OP 636 PS 637: Performed by: ORTHOPAEDIC SURGERY

## 2022-02-18 PROCEDURE — 97530 THERAPEUTIC ACTIVITIES: CPT | Mod: GP | Performed by: PHYSICAL THERAPIST

## 2022-02-18 PROCEDURE — 97535 SELF CARE MNGMENT TRAINING: CPT | Mod: GO

## 2022-02-18 PROCEDURE — 250N000011 HC RX IP 250 OP 636: Performed by: ORTHOPAEDIC SURGERY

## 2022-02-18 PROCEDURE — 120N000001 HC R&B MED SURG/OB

## 2022-02-18 PROCEDURE — 82565 ASSAY OF CREATININE: CPT

## 2022-02-18 PROCEDURE — 97116 GAIT TRAINING THERAPY: CPT | Mod: GP | Performed by: PHYSICAL THERAPIST

## 2022-02-18 PROCEDURE — 99231 SBSQ HOSP IP/OBS SF/LOW 25: CPT | Performed by: HOSPITALIST

## 2022-02-18 PROCEDURE — 250N000013 HC RX MED GY IP 250 OP 250 PS 637: Performed by: ORTHOPAEDIC SURGERY

## 2022-02-18 PROCEDURE — 36415 COLL VENOUS BLD VENIPUNCTURE: CPT

## 2022-02-18 RX ORDER — ASPIRIN 81 MG/1
81 TABLET, CHEWABLE ORAL DAILY
DISCHARGE
Start: 2022-03-20 | End: 2023-03-11

## 2022-02-18 RX ORDER — LANOLIN ALCOHOL/MO/W.PET/CERES
3 CREAM (GRAM) TOPICAL
DISCHARGE
Start: 2022-02-18 | End: 2022-04-25

## 2022-02-18 RX ADMIN — ACETAMINOPHEN 650 MG: 325 TABLET, FILM COATED ORAL at 20:08

## 2022-02-18 RX ADMIN — ACETAMINOPHEN 650 MG: 325 TABLET, FILM COATED ORAL at 15:43

## 2022-02-18 RX ADMIN — ASPIRIN 81 MG CHEWABLE TABLET 81 MG: 81 TABLET CHEWABLE at 08:12

## 2022-02-18 RX ADMIN — OXYCODONE HYDROCHLORIDE 5 MG: 5 TABLET ORAL at 15:43

## 2022-02-18 RX ADMIN — ENOXAPARIN SODIUM 40 MG: 40 INJECTION SUBCUTANEOUS at 14:16

## 2022-02-18 RX ADMIN — FAMOTIDINE 20 MG: 20 TABLET ORAL at 08:11

## 2022-02-18 RX ADMIN — CARVEDILOL 6.25 MG: 3.12 TABLET, FILM COATED ORAL at 17:30

## 2022-02-18 RX ADMIN — CITALOPRAM HYDROBROMIDE 20 MG: 20 TABLET ORAL at 08:11

## 2022-02-18 RX ADMIN — APREPITANT 40 MG: 40 CAPSULE ORAL at 08:11

## 2022-02-18 RX ADMIN — ACETAMINOPHEN 650 MG: 325 TABLET, FILM COATED ORAL at 08:10

## 2022-02-18 RX ADMIN — OXYCODONE HYDROCHLORIDE 5 MG: 5 TABLET ORAL at 20:08

## 2022-02-18 RX ADMIN — BISACODYL 10 MG: 10 SUPPOSITORY RECTAL at 08:24

## 2022-02-18 RX ADMIN — FAMOTIDINE 20 MG: 20 TABLET ORAL at 20:08

## 2022-02-18 RX ADMIN — SENNOSIDES AND DOCUSATE SODIUM 1 TABLET: 50; 8.6 TABLET ORAL at 08:12

## 2022-02-18 RX ADMIN — POLYETHYLENE GLYCOL 3350 17 G: 17 POWDER, FOR SOLUTION ORAL at 08:12

## 2022-02-18 RX ADMIN — CARVEDILOL 6.25 MG: 3.12 TABLET, FILM COATED ORAL at 08:11

## 2022-02-18 RX ADMIN — SENNOSIDES AND DOCUSATE SODIUM 1 TABLET: 50; 8.6 TABLET ORAL at 20:08

## 2022-02-18 ASSESSMENT — ACTIVITIES OF DAILY LIVING (ADL)
ADLS_ACUITY_SCORE: 11
ADLS_ACUITY_SCORE: 9
ADLS_ACUITY_SCORE: 11
ADLS_ACUITY_SCORE: 9
ADLS_ACUITY_SCORE: 11
ADLS_ACUITY_SCORE: 9
ADLS_ACUITY_SCORE: 11

## 2022-02-18 NOTE — PROGRESS NOTES
Orthopedic Surgery  Kandischato Tse  2022  Admit Date:  2022  POD 4 Days Post-Op  S/P Procedure(s):  RIGHT INTERTROCHANTERIC FEMUR NAILING    Patient resting comfortably in bed.    Pain controlled.  Tolerating oral intake.    Denies nausea or vomiting  Denies chest pain or shortness of breath  No events overnight.     Alert and orient to person, place, and time.  Vital Sign Ranges  Temperature Temp  Av.2  F (36.8  C)  Min: 97.9  F (36.6  C)  Max: 98.3  F (36.8  C)   Blood pressure Systolic (24hrs), Av , Min:122 , Max:137        Diastolic (24hrs), Av, Min:59, Max:72      Pulse Pulse  Av  Min: 66  Max: 89   Respirations Resp  Av  Min: 16  Max: 16   Pulse oximetry SpO2  Av %  Min: 91 %  Max: 97 %       Dressing is clean, dry, and intact.   Minimal erythema of the surrounding skin.   Bilateral calves are soft, non-tender.  Bilateral lower extremity is NVI.  Sensation intact bilateral lower extremities  5/5 motor with resisted dorsi and plantar flexion bilaterally  +Dp pulse    Labs:  Recent Labs   Lab Test 22  0834 22  1606 22  1155   POTASSIUM 3.6 3.5 4.1     Recent Labs   Lab Test 22  0728 02/15/22  1013 22  0834   HGB 9.7* 9.9* 11.5*     No results for input(s): INR in the last 07173 hours.  Recent Labs   Lab Test 22  0839 22  0834 22  1606   * 138* 158       A/P  1. S/p right intertrochanteric femur fracture IM nail   Continue Lovenox for DVT prophylaxis.     Mobilize with PT/OT    WBAT with walker   Leave dressing intact.     Continue current pain regiment.    2. Disposition   Anticipate d/c to home based on navigation of stairs/PT today.    Deena Garcia PA-C

## 2022-02-18 NOTE — DISCHARGE SUMMARY
[facility]  Hospitalist Discharge Summary      Date of Admission:  2/13/2022  Date of Discharge:  2/18/2022  Discharging Provider: Padmini Holguin MD  Discharge Service: Hospitalist Service    Discharge Diagnoses        Acute right hip fracture following a fall s/p ORIF on 2/14/2022    Acute blood loss anemia    Essential hypertension    Major recurrent depressive disorder and anxiety      Follow-ups Needed After Discharge   Follow-up Appointments     Follow-up and recommended labs and tests      Follow-up with Dr INO Carcamo at [facility] 2 weeks following your surgery.   To arrange appointment or questions call: the care coordinator at   [phone]  [facility] [place] phone number: [phone]  [facility] [place] phone number: [phone]               Unresulted Labs Ordered in the Past 30 Days of this Admission     No orders found from 1/14/2022 to 2/14/2022.      These results will be followed up by none    Discharge Disposition   Discharged to home with home cares  Condition at discharge: Stable      Hospital Course   Kandis Tse is a 76 year old female with hx of HTN and depression/anxiety who was admitted on 2/13/2022 for a right hip fracture following a fall. She is s/p uncomplicated ORIF     Acute right hip fracture following a fall s/p ORIF on 2/14/2022  Missed a step while walking down the stairs. XR pelvis and right hip on arrival revealed a comminuted intertrochanteric fracture of the right hip. Ortho was consulted and patient underwent uncomplicated ORIF by Dr. INO Carcamo on 2/14.   - Post-op cares continued as per Ortho  - WBAT RLE with walker  - On enoxaparin for VTE ppx post op, discharging home with 1 month of  mg daily  - Pain controlled with APAP 975 mg TID, oxycodone prn  - Per therapy eval, discharging home with HHPT     Acute blood loss anemia due to surgery  Hgb prior to surgery 13. Decreased to mid-9 range post surgery     Essential hypertension  CAD  H/o  CVA  - Continues on PTA carvedilol  -PTA is on aspirin 81 mg p.o. daily.  Patient has been placed on aspirin 325 mg daily for 30 days per Ortho.  Patient will resume 81 mg daily after 30 days     Major recurrent depressive disorder and anxiety  - Continues on PTA citalopram    Consultations This Hospital Stay   ORTHOPEDIC SURGERY IP CONSULT  HOSPITALIST IP CONSULT  PHYSICAL THERAPY ADULT IP CONSULT  OCCUPATIONAL THERAPY ADULT IP CONSULT  CARE MANAGEMENT / SOCIAL WORK IP CONSULT    Code Status   Full Code    Time Spent on this Encounter   I, Padmini Holguin MD, personally saw the patient today and spent less than or equal to 30 minutes discharging this patient.       Padmini Holguin MD  [facility]TH[facility]  [address] [place] [id number]  Phone: [phone]  Fax: [phone]  ______________________________________________________________________    Physical Exam   Vital Signs: Temp: 98.2  F (36.8  C) Temp src: Oral BP: 137/72 Pulse: 89   Resp: 16 SpO2: 95 % O2 Device: None (Room air)    Weight: 140 lbs 0 oz    Constitutional:   Appears comfortable  HEENT: Sclera white, MMM  Respiratory: Breathing non-labored.  Skin/Integument: No rash  Neuro: Alert and appropriate, RAJAN  Psych: Calm and cooperative       Primary Care Physician   Yoko Lopez    Discharge Orders      Primary Care - Care Coordination Referral      Home care nursing referral      Reason for your hospital stay    Right hip fracture: IM Nail     Follow-up and recommended labs and tests    Follow-up with Dr INO Carcamo at [facility] 2 weeks following your surgery.   To arrange appointment or questions call: the care coordinator at [phone]  [facility] phone number: [phone]  [facility] phone number: [phone]     Activity    Your activity upon discharge: WBAT with walker.  Rest as needed.     When to contact your care team    Call your provider if you have any of the following: temperature greater than  101,  increased shortness of breath, increased drainage, redness or increasing calf pain/cramping.     Wound care and dressings    Instructions to care for your wound at home: Keep wound clean and dry.  Keep incision covered.  Able to shower over aquacel or tegaderm dressing.  Do not immerse.     Crutches DME    DME Documentation: Describe the reason for need to support medical necessity: Impaired gait status post hip surgery. I, the undersigned, certify that the above prescribed supplies are medically necessary for this patient and is both reasonable and necessary in reference to accepted standards of medical practice in the treatment of this patient's condition and is not prescribed as a convenience.     Cane DME    DME Documentation: Describe the reason for need to support medical necessity: Impaired gait status post hip surgery. I, the undersigned, certify that the above prescribed supplies are medically necessary for this patient and is both reasonable and necessary in reference to accepted standards of medical practice in the treatment of this patient's condition and is not prescribed as a convenience.     Walker DME    : DME Documentation: Describe the reason for need to support medical necessity: Impaired gait status post hip surgery. I, the undersigned, certify that the above prescribed supplies are medically necessary for this patient and is both reasonable and necessary in reference to accepted standards of medical practice in the treatment of this patient's condition and is not prescribed as a convenience.     Commode Chair Order    DME Documentation:   Describe the reason for need to support medical necessity: needing assist with transfer, and urgency at night.     I, the undersigned, certify that the above prescribed supplies are medically necessary for this patient and is both reasonable and necessary in reference to accepted standards of medical and necessary in reference to accepted standards of  medical practice in the treatment of this patient's condition and is not prescribed as a convenience.     Diet    Follow this diet upon discharge: Orders Placed This Encounter      Advance Diet as Tolerated: Regular Diet Adult       Significant Results and Procedures   Most Recent 3 CBC's:  Recent Labs   Lab Test 02/17/22  0839 02/16/22  0728 02/15/22  1013 02/14/22  0834 02/13/22  1606 03/16/21  1230 03/26/19  1506   WBC  --   --   --  5.9 4.9  --  7.4   HGB  --  9.7* 9.9* 11.5* 13.0   < > 14.4   MCV  --   --   --  102* 101*  --  88   *  --   --  138* 158   < > 183    < > = values in this interval not displayed.     Most Recent 3 BMP's:  Recent Labs   Lab Test 02/18/22  0807 02/15/22  0540 02/14/22  0834 02/13/22  1606 01/03/22  1155   NA  --   --  138 140 136   POTASSIUM  --   --  3.6 3.5 4.1   CHLORIDE  --   --  106 106 103   CO2  --   --  26 28 28   BUN  --   --  21 20 18   CR 0.60  --  0.57 0.69 0.77   ANIONGAP  --   --  6 6 5   GAVIN  --   --  8.4* 8.7 9.5   GLC  --  113* 97 91 92     Most Recent 6 Bacteria Isolates From Any Culture (See EPIC Reports for Culture Details):No lab results found.  Most Recent TSH and T4:No lab results found.  Most Recent 6 glucoses:  Recent Labs   Lab Test 02/15/22  0540 02/14/22  0834 02/13/22  1606 01/03/22  1155 03/17/21  0802 08/05/20  0956   * 97 91 92 116* 91     Most Recent Urinalysis:No lab results found.,   Results for orders placed or performed during the hospital encounter of 02/13/22   XR Pelvis w Hip Right 1 View    Narrative    EXAM: XR PELVIS AND HIP RIGHT 1 VIEW  LOCATION: [facility]  DATE/TIME: 2/13/2022 4:06 PM    INDICATION: Pain.  COMPARISON: None.      Impression    IMPRESSION: Comminuted intertrochanteric fracture of the right hip. Slight superolateral subluxation of the femoral shaft. No dislocation at the level of the hip joint. Postoperative changes of left total hip arthroplasty. There is some irregularity at   the  junction of the left inferior pubic ramus and pubic body which is worrisome for fracture but this may represent a differing chronicity injury. Pelvis otherwise negative.   XR Surgery SUE L/T 5 Min Fluoro w Stills    Narrative    XR SURGERY C-ARM FLUOROSCOPY LESS THAN FIVE MINUTES WITH STILLS   2/14/2022 2:00 PM     HISTORY: Open reduction internal fixation (ORIF) right hip.     C-Arm #1. [id number]. 1 min and 6 sec fluoro time. 6 images. OR 33.  SKS/AC.    NUMBER OF IMAGES ACQUIRED: 6    FLUOROSCOPY TIME: 1.1 minute      Impression    IMPRESSION: Six intraoperative fluoroscopic spot images are provided  for review during reduction and nailing of right proximal femur  fracture with antegrade intramedullary nail and both proximal and  distal screws. See operative summary if further details are needed.    MIMA HARDING MD         SYSTEM ID:  [id number]       Discharge Medications   Current Discharge Medication List      START taking these medications    Details   acetaminophen (TYLENOL) 325 MG tablet Take 2 tablets (650 mg) by mouth every 4 hours as needed for other (For optimal non-opioid multimodal pain management to improve pain control.)  Qty: 30 tablet, Refills: 0    Associated Diagnoses: Closed displaced intertrochanteric fracture of right femur, initial encounter (H)      aspirin (ASA) 325 MG EC tablet Take 1 tablet (325 mg) by mouth daily  Qty: 30 tablet, Refills: 0    Associated Diagnoses: Closed displaced intertrochanteric fracture of right femur, initial encounter (H)      ondansetron (ZOFRAN-ODT) 4 MG ODT tab Take 1 tablet (4 mg) by mouth every 6 hours as needed for nausea or vomiting  Qty: 8 tablet, Refills: 0    Associated Diagnoses: Closed displaced intertrochanteric fracture of right femur, initial encounter (H)      oxyCODONE (ROXICODONE) 5 MG tablet Take 1-2 tablets (5-10 mg) by mouth every 4 hours as needed for moderate to severe pain  Qty: 30 tablet, Refills: 0    Associated Diagnoses: Closed  displaced intertrochanteric fracture of right femur, initial encounter (H)      senna-docusate (SENOKOT-S/PERICOLACE) 8.6-50 MG tablet Take 1 tablet by mouth 2 times daily as needed for constipation  Qty: 20 tablet, Refills: 0    Associated Diagnoses: Closed displaced intertrochanteric fracture of right femur, initial encounter (H)         CONTINUE these medications which have CHANGED    Details   aspirin (ASA) 81 MG chewable tablet Take 1 tablet (81 mg) by mouth daily    Comments: Resume after 30 days of 325 mg daily completed  Associated Diagnoses: History of CVA (cerebrovascular accident)         CONTINUE these medications which have NOT CHANGED    Details   carvedilol (COREG) 6.25 MG tablet Take 1 tablet (6.25 mg) by mouth 2 times daily (with meals)  Qty: 180 tablet, Refills: 3    Comments: Profile Rx: patient will contact pharmacy when needed  Associated Diagnoses: Takotsubo cardiomyopathy      citalopram (CELEXA) 20 MG tablet Take 1 tablet (20 mg) by mouth daily  Qty: 90 tablet, Refills: 3    Comments: Profile Rx: patient will contact pharmacy when needed  Associated Diagnoses: Mood disorder (H)      melatonin 3 MG tablet Take 1 tablet (3 mg) by mouth nightly as needed for sleep    Associated Diagnoses: Insomnia, unspecified type         STOP taking these medications       naproxen sodium (ANAPROX) 220 MG tablet Comments:   Reason for Stopping:             Allergies   No Known Allergies

## 2022-02-18 NOTE — PROGRESS NOTES
Care Management Discharge Note    Discharge Date: 02/18/2022       Discharge Disposition: Home Care    Discharge Services: None    Discharge DME:  Cane, walker, commode    Discharge Transportation: family or friend will provide    Private pay costs discussed: Not applicable    PAS Confirmation Code:    Patient/family educated on Medicare website which has current facility and service quality ratings:  Yes    Education Provided on the Discharge Plan:    Persons Notified of Discharge Plans: patient, bedside RN  Patient/Family in Agreement with the Plan: yes    Handoff Referral Completed: NA    Additional Information:  Pt was discharged from ortho and therapies today. PT is recommending home PT. Verified with [facility] that they can accept Kandis for PT and will do a SOC this weekend. AVS updated with home care information. Bedside RN updated.        Francia Myers RN

## 2022-02-18 NOTE — PROGRESS NOTES
[facility]  Medicine Progress Note - Hospitalist Service       Date of Admission:  2/13/2022    Assessment & Plan      Kandis Tse is a 76 year old female with hx of HTN and depression/anxiety who was admitted on 2/13/2022 for a right hip fracture following a fall. She is s/p uncomplicated ORIF     Acute right hip fracture following a fall s/p ORIF on 2/14/2022  Missed a step while walking down the stairs. XR pelvis and right hip on arrival revealed a comminuted intertrochanteric fracture of the right hip. Ortho was consulted and patient underwent uncomplicated ORIF by Dr. INO Carcamo on 2/14.   - Post-op cares as per Ortho  - WBAT RLE with walker  - On enoxaparin for VTE ppx as per Ortho  - Pain controlled with APAP 975 mg TID, oxycodone prn  - PT currently recommending HHPT, patient will continue to work with PT for 1 more day to monitor progress in mobility for safe discharge planning. May need TCU if can't progress.     Acute blood loss anemia due to surgery  Hgb prior to surgery 13. Decreased to mid-9 range post surgery     Essential hypertension  - Continues on PTA carvedilol     Major recurrent depressive disorder and anxiety  - Continues on PTA citalopram     Diet: Advance Diet as Tolerated: Regular Diet Adult  Diet    DVT Prophylaxis: Pneumatic Compression Devices  Whiteside Catheter: Not present  Code Status: Full Code         Disposition: Expected discharge home vs TCU if ongoing therapy need, 1/19 once cleared by Ortho, PT/OT. Patient has a lot of stairs in her home, and is a primary caregiver for her ; also has a special needs daughter. Still having some limitations with mobility, will likely be ready tomorrow.    The patient's care was discussed with the Patient and orthopedic surgery team    Padmini Holguin MD  Hospitalist Service  [facility]  Contact information available via [facility]  [place]/y    ______________________________________________________________________    Interval History      No acute issues.  Postop pain adequately controlled with current regimen.  - worked with PT, continues to have some limitations with mobility, unable to safely discharge home.  - Denies chest pain, shortness of breath, nausea, dizziness, diarrhea.    Data reviewed today: I reviewed all medications, new labs and imaging results over the last 24 hours. I personally reviewed no images or EKG's today.    Physical Exam   Vital Signs: Temp: 98.5  F (36.9  C) Temp src: Oral BP: 112/65 Pulse: 75   Resp: 16 SpO2: 98 % O2 Device: None (Room air)    Weight: 140 lbs 0 oz     Constitutional: Appears comfortable  HEENT: Sclera white, MMM  Respiratory: Breathing non-labored.  Skin/Integument: No rash  Neuro: Alert and appropriate, RAJAN  Psych: Calm and cooperative    Data   Recent Labs   Lab 02/18/22  0807 02/17/22  0839 02/16/22  0728 02/15/22  1013 02/15/22  0540 02/14/22  0834 02/13/22  1606   WBC  --   --   --   --   --  5.9 4.9   HGB  --   --  9.7* 9.9*  --  11.5* 13.0   MCV  --   --   --   --   --  102* 101*   PLT  --  148*  --   --   --  138* 158   NA  --   --   --   --   --  138 140   POTASSIUM  --   --   --   --   --  3.6 3.5   CHLORIDE  --   --   --   --   --  106 106   CO2  --   --   --   --   --  26 28   BUN  --   --   --   --   --  21 20   CR 0.60  --   --   --   --  0.57 0.69   ANIONGAP  --   --   --   --   --  6 6   GAVIN  --   --   --   --   --  8.4* 8.7   GLC  --   --   --   --  113* 97 91         No results found for this or any previous visit (from the past 24 hour(s)).    Medications       aspirin  81 mg Oral Daily     carvedilol  6.25 mg Oral BID w/meals     citalopram  20 mg Oral Daily     enoxaparin ANTICOAGULANT  40 mg Subcutaneous Q24H     famotidine  20 mg Oral BID    Or     famotidine  20 mg Intravenous BID     polyethylene glycol  17 g Oral Daily     senna-docusate  1 tablet Oral BID      sodium chloride (PF)  3 mL Intracatheter Q8H     sodium chloride (PF)  3 mL Intracatheter Q8H

## 2022-02-19 ENCOUNTER — APPOINTMENT (OUTPATIENT)
Dept: PHYSICAL THERAPY | Facility: CLINIC | Age: 77
DRG: 481 | End: 2022-02-19
Payer: COMMERCIAL

## 2022-02-19 ENCOUNTER — APPOINTMENT (OUTPATIENT)
Dept: GENERAL RADIOLOGY | Facility: CLINIC | Age: 77
DRG: 481 | End: 2022-02-19
Attending: PHYSICIAN ASSISTANT
Payer: COMMERCIAL

## 2022-02-19 ENCOUNTER — APPOINTMENT (OUTPATIENT)
Dept: OCCUPATIONAL THERAPY | Facility: CLINIC | Age: 77
DRG: 481 | End: 2022-02-19
Payer: COMMERCIAL

## 2022-02-19 PROCEDURE — 97535 SELF CARE MNGMENT TRAINING: CPT | Mod: GO

## 2022-02-19 PROCEDURE — 73562 X-RAY EXAM OF KNEE 3: CPT | Mod: RT

## 2022-02-19 PROCEDURE — 97116 GAIT TRAINING THERAPY: CPT | Mod: GP | Performed by: PHYSICAL THERAPIST

## 2022-02-19 PROCEDURE — 99233 SBSQ HOSP IP/OBS HIGH 50: CPT | Performed by: HOSPITALIST

## 2022-02-19 PROCEDURE — 120N000001 HC R&B MED SURG/OB

## 2022-02-19 PROCEDURE — 250N000011 HC RX IP 250 OP 636: Performed by: ORTHOPAEDIC SURGERY

## 2022-02-19 PROCEDURE — 250N000013 HC RX MED GY IP 250 OP 250 PS 637: Performed by: ORTHOPAEDIC SURGERY

## 2022-02-19 PROCEDURE — 97530 THERAPEUTIC ACTIVITIES: CPT | Mod: GP | Performed by: PHYSICAL THERAPIST

## 2022-02-19 RX ADMIN — FAMOTIDINE 20 MG: 20 TABLET ORAL at 09:12

## 2022-02-19 RX ADMIN — SENNOSIDES AND DOCUSATE SODIUM 1 TABLET: 50; 8.6 TABLET ORAL at 09:13

## 2022-02-19 RX ADMIN — ASPIRIN 81 MG CHEWABLE TABLET 81 MG: 81 TABLET CHEWABLE at 09:13

## 2022-02-19 RX ADMIN — OXYCODONE HYDROCHLORIDE 5 MG: 5 TABLET ORAL at 17:20

## 2022-02-19 RX ADMIN — CARVEDILOL 6.25 MG: 3.12 TABLET, FILM COATED ORAL at 17:20

## 2022-02-19 RX ADMIN — CITALOPRAM HYDROBROMIDE 20 MG: 20 TABLET ORAL at 09:13

## 2022-02-19 RX ADMIN — ENOXAPARIN SODIUM 40 MG: 40 INJECTION SUBCUTANEOUS at 12:09

## 2022-02-19 RX ADMIN — OXYCODONE HYDROCHLORIDE 5 MG: 5 TABLET ORAL at 10:17

## 2022-02-19 RX ADMIN — OXYCODONE HYDROCHLORIDE 5 MG: 5 TABLET ORAL at 21:35

## 2022-02-19 RX ADMIN — CARVEDILOL 6.25 MG: 3.12 TABLET, FILM COATED ORAL at 09:13

## 2022-02-19 RX ADMIN — FAMOTIDINE 20 MG: 20 TABLET ORAL at 21:35

## 2022-02-19 RX ADMIN — POLYETHYLENE GLYCOL 3350 17 G: 17 POWDER, FOR SOLUTION ORAL at 09:12

## 2022-02-19 ASSESSMENT — ACTIVITIES OF DAILY LIVING (ADL)
ADLS_ACUITY_SCORE: 11
ADLS_ACUITY_SCORE: 9
ADLS_ACUITY_SCORE: 11
ADLS_ACUITY_SCORE: 9

## 2022-02-19 NOTE — PROGRESS NOTES
Care Management Follow Up    Length of Stay (days): 6    Expected Discharge Date: 02/19/2022     Concerns to be Addressed:     Discharge planning  Patient plan of care discussed at interdisciplinary rounds: Yes    Anticipated Discharge Disposition:   TCU placement     Anticipated Discharge Services: Therapy  Anticipated Discharge DME: Raised Toilet Seat, Walker    Patient/family educated on Medicare website which has current facility and service quality ratings:  N/A  Education Provided on the Discharge Plan:  yes  Patient/Family in Agreement with the Plan: yes    Referrals Placed by CM/SW: TCU referrals  Private pay costs discussed: Not applicable    Additional Information:  Spoke with Dr. Brooks who has spoken with the patient regarding discharge plans.  Patient is now agreeable to have TCU referrals sent.  Patient is asking for a referral to be sent to [place].  Referral sent, via discharge on the double, to check bed availability.    Will continue to follow.      MARIETTA Lizama, [facility] [place]    [phone]  [facility]

## 2022-02-19 NOTE — PROGRESS NOTES
"Orthopedic Surgery  Kandischato Tse  2/19/2022  Admit Date:  2/13/2022  POD 5 Days Post-Op  S/P Procedure(s):  RIGHT INTERTROCHANTERIC FEMUR NAILING    Patient resting comfortably in bed.    Pain controlled but is holding her up when she tries to work with therapy, having pain in the right knee and anxious about it.  Tolerating oral intake.    Denies nausea or vomiting  Denies chest pain or shortness of breath  No events overnight.     Alert and orient to person, place, and time.  Vital Sign Ranges  /72   Pulse 81   Temp 98  F (36.7  C)   Resp 16   Ht 1.727 m (5' 8\")   Wt 63.5 kg (140 lb)   SpO2 97%   BMI 21.29 kg/m      Dressings are clean, dry, and intact.   Minimal erythema of the surrounding skin.   Bilateral calves are soft, non-tender.  Bilateral lower extremity is NVI.  Sensation intact bilateral lower extremities  5/5 motor with resisted dorsi and plantar flexion bilaterally  +Dp pulse    Labs:  Hemoglobin   Date Value Ref Range Status   02/16/2022 9.7 (L) 11.7 - 15.7 g/dL Final   03/18/2021 8.8 (L) 11.7 - 15.7 g/dL Final     Platelet Count   Date Value Ref Range Status   02/17/2022 148 (L) 150 - 450 10e3/uL Final   03/16/2021 163 150 - 450 10e9/L Final       A/P  1. S/p right intertrochanteric femur fracture IM nail      Right knee pain - will get xr, xr reviewed with no evidence of fracture or acute pathology on xr      Slow progress with therapies   Continue Lovenox for DVT prophylaxis.     Mobilize with PT/OT    WBAT with walker   Leave dressing intact.     Continue current pain regimen.    2. Disposition   Anticipate d/c to home based on navigation of stairs/PT today.    Kjerstin Foss PA-C [facility]    "

## 2022-02-19 NOTE — PROGRESS NOTES
[facility]    Hospitalist Progress Note      Assessment & Plan   Kandis Tse is a 76 year old female who was admitted on 2/13/2022 for right hip fracture after a fall.    Acute right hip fracture following a fall s/p ORIF on 2/14/2022  Missed a step while walking down the stairs. XR pelvis and right hip on arrival revealed a comminuted intertrochanteric fracture of the right hip. Ortho was consulted and patient underwent uncomplicated ORIF by Dr. INO Carcamo on 2/14.   - Post-op cares as per Ortho  - WBAT RLE with walker  - On enoxaparin for VTE ppx as per Ortho  - Pain controlled with APAP 975 mg TID, oxycodone prn  - difficulty with stairs continues, not appropriate for discharge to home on 2/19, sent referral for [facility]     Acute blood loss anemia due to surgery  Hgb prior to surgery 13. Decreased to mid-9 range post surgery  - Hgb check in AM     Essential hypertension  - Continues on PTA carvedilol     Major recurrent depressive disorder and anxiety  - Continues on PTA citalopram    Clinically Significant Risk Factors Present on Admission                      DVT Prophylaxis: Enoxaparin (Lovenox) SQ  Code Status: Full Code  Expected Discharge: 02/20/2022     Anticipated discharge location:  Awaiting care coordination huddle  Delays:    plan TCU vs C pending ability to tolerate stairs       Megha Brooks DO  Hospitalist Service  [facility]  Securely message with the Vocera Web Console (learn more here)  Text Page (7am - 6pm) via [employer] Paging/Directory      Interval History   Patient seen and examined. No chest pain, shortness of breath, nausea, vomiting. Tolerating diet. Does have a lot of pain in her right thigh/knee today. Underwent knee x-ray without change. She feels like she can barely walk with walker today. She is very hopeful to go home, but is realistic in that it might not be the best decision given ongoing pain with movement. Discussed  care plan with CC  Spent 35 minutes with >50% time reviewing chart, discharge that was started, discussing care plan with patient, RN and CC/SW, including discussing TCU option.    -Data reviewed today: I reviewed all new labs and imaging results over the last 24 hours. I personally reviewed right knee x-ray without fracture or malalignment, mild degenerative changes. No effusion. Intramedullary nail noted in femur.    Physical Exam   Temp: 98  F (36.7  C) Temp src: Oral BP: 137/72 Pulse: 81   Resp: 16 SpO2: 97 % O2 Device: None (Room air)    Vitals:    02/13/22 1536   Weight: 63.5 kg (140 lb)     Vital Signs with Ranges  Temp:  [97.7  F (36.5  C)-98.5  F (36.9  C)] 98  F (36.7  C)  Pulse:  [74-81] 81  Resp:  [16] 16  BP: (101-137)/(61-72) 137/72  SpO2:  [97 %-98 %] 97 %  No intake/output data recorded.    Constitutional: Awake, alert, cooperative, no apparent distress  Respiratory: Clear to auscultation bilaterally, no crackles or wheezing  Cardiovascular: Regular rate and rhythm, normal S1 and S2, and no murmur noted  GI: Normal bowel sounds, soft, non-distended, non-tender  Skin/Integumen: No rashes, no cyanosis, no edema  Other: Right hip dressing intact, but some blood seen. Ecchymosis at back of right knee, soft muscle compartments, mild-mod swelling overall in thigh. No effusion at knee.    Medications       aspirin  81 mg Oral Daily     carvedilol  6.25 mg Oral BID w/meals     citalopram  20 mg Oral Daily     enoxaparin ANTICOAGULANT  40 mg Subcutaneous Q24H     famotidine  20 mg Oral BID    Or     famotidine  20 mg Intravenous BID     polyethylene glycol  17 g Oral Daily     senna-docusate  1 tablet Oral BID     sodium chloride (PF)  3 mL Intracatheter Q8H     sodium chloride (PF)  3 mL Intracatheter Q8H       Data   Recent Labs   Lab 02/18/22  0807 02/17/22  0839 02/16/22  0728 02/15/22  1013 02/15/22  0540 02/14/22  0834 02/13/22  1606   WBC  --   --   --   --   --  5.9 4.9   HGB  --   --  9.7* 9.9*  --   11.5* 13.0   MCV  --   --   --   --   --  102* 101*   PLT  --  148*  --   --   --  138* 158   NA  --   --   --   --   --  138 140   POTASSIUM  --   --   --   --   --  3.6 3.5   CHLORIDE  --   --   --   --   --  106 106   CO2  --   --   --   --   --  26 28   BUN  --   --   --   --   --  21 20   CR 0.60  --   --   --   --  0.57 0.69   ANIONGAP  --   --   --   --   --  6 6   GAVIN  --   --   --   --   --  8.4* 8.7   GLC  --   --   --   --  113* 97 91       Recent Results (from the past 24 hour(s))   XR Knee Port Right 3 Views    Narrative    EXAM: XR KNEE PORTABLE RIGHT 3 VIEWS  LOCATION: [facility]  DATE/TIME: 02/19/2022, 9:47 AM    INDICATION: Right-sided knee pain after a fall.  COMPARISON: None.      Impression    IMPRESSION:  1.  No fracture or joint malalignment.  2.  Mild degenerative changes in the right knee patellofemoral compartment.  3.  No joint effusion.  4.  Intramedullary nail in the femur.

## 2022-02-19 NOTE — PLAN OF CARE
Occupational Therapy Discharge Summary    Reason for therapy discharge:    Patient/family request discontinuation of services.    Progress towards therapy goal(s). See goals on Care Plan in [facility] electronic health record for goal details.  Goals not met.  Barriers to achieving goals:   Patient declines ADL practice.    Therapy recommendation(s):    No further therapy is recommended.

## 2022-02-19 NOTE — PROGRESS NOTES
Patient A&Ox4, VSS on Ra. Up with A1 & walker , void BR. Pain managed with Tylenol & oxycodone . Scant drainage on dressing. Scattered bruises . Possible discharge home After PT evaluation . Will continue monitor.

## 2022-02-19 NOTE — PLAN OF CARE
Goal Outcome Evaluation:    Plan of Care Reviewed With: patient, daughter     Overall Patient Progress: improving    Outcome Evaluation: Kandis is ready for discharge home. Will have C PT through [facility].    Pt A&Ox4, VS stable, CMS intact, pt voiding and eating, up w1 assist, did not pass PT for today. Possible discharge 2/19/22, pain managed with tylenol and oxy.

## 2022-02-19 NOTE — PLAN OF CARE
Goal Outcome Evaluation:    Plan of Care Reviewed With: patient     Overall Patient Progress: improving    Outcome Evaluation: Kandis is ready for discharge home. Will have HHC PT through [facility].  VS stable, CMS stable, A&Ox4, voiding, eating, hydrating, up w1 assist, dressing CDI. Pain managed with tylenol and/or oxy.

## 2022-02-20 ENCOUNTER — APPOINTMENT (OUTPATIENT)
Dept: PHYSICAL THERAPY | Facility: CLINIC | Age: 77
DRG: 481 | End: 2022-02-20
Payer: COMMERCIAL

## 2022-02-20 VITALS
WEIGHT: 140 LBS | SYSTOLIC BLOOD PRESSURE: 117 MMHG | DIASTOLIC BLOOD PRESSURE: 66 MMHG | OXYGEN SATURATION: 95 % | HEART RATE: 77 BPM | TEMPERATURE: 98.7 F | BODY MASS INDEX: 21.22 KG/M2 | RESPIRATION RATE: 16 BRPM | HEIGHT: 68 IN

## 2022-02-20 LAB
CREAT SERPL-MCNC: 0.63 MG/DL (ref 0.52–1.04)
GFR SERPL CREATININE-BSD FRML MDRD: >90 ML/MIN/1.73M2
GLUCOSE BLDC GLUCOMTR-MCNC: 87 MG/DL (ref 70–99)
GLUCOSE BLDC GLUCOMTR-MCNC: 96 MG/DL (ref 70–99)
HGB BLD-MCNC: 9.7 G/DL (ref 11.7–15.7)
PLATELET # BLD AUTO: 223 10E3/UL (ref 150–450)

## 2022-02-20 PROCEDURE — 250N000013 HC RX MED GY IP 250 OP 250 PS 637: Performed by: ORTHOPAEDIC SURGERY

## 2022-02-20 PROCEDURE — 250N000011 HC RX IP 250 OP 636: Performed by: ORTHOPAEDIC SURGERY

## 2022-02-20 PROCEDURE — 85018 HEMOGLOBIN: CPT | Performed by: HOSPITALIST

## 2022-02-20 PROCEDURE — 99239 HOSP IP/OBS DSCHRG MGMT >30: CPT | Performed by: HOSPITALIST

## 2022-02-20 PROCEDURE — 97530 THERAPEUTIC ACTIVITIES: CPT | Mod: GP | Performed by: PHYSICAL THERAPIST

## 2022-02-20 PROCEDURE — 82565 ASSAY OF CREATININE: CPT

## 2022-02-20 PROCEDURE — 36415 COLL VENOUS BLD VENIPUNCTURE: CPT | Performed by: HOSPITALIST

## 2022-02-20 PROCEDURE — 97116 GAIT TRAINING THERAPY: CPT | Mod: GP | Performed by: PHYSICAL THERAPIST

## 2022-02-20 PROCEDURE — 85049 AUTOMATED PLATELET COUNT: CPT | Performed by: INTERNAL MEDICINE

## 2022-02-20 RX ADMIN — FAMOTIDINE 20 MG: 20 TABLET ORAL at 08:50

## 2022-02-20 RX ADMIN — POLYETHYLENE GLYCOL 3350 17 G: 17 POWDER, FOR SOLUTION ORAL at 08:51

## 2022-02-20 RX ADMIN — ENOXAPARIN SODIUM 40 MG: 40 INJECTION SUBCUTANEOUS at 13:00

## 2022-02-20 RX ADMIN — ASPIRIN 81 MG CHEWABLE TABLET 81 MG: 81 TABLET CHEWABLE at 08:49

## 2022-02-20 RX ADMIN — CARVEDILOL 6.25 MG: 3.12 TABLET, FILM COATED ORAL at 08:50

## 2022-02-20 RX ADMIN — CITALOPRAM HYDROBROMIDE 20 MG: 20 TABLET ORAL at 08:50

## 2022-02-20 RX ADMIN — OXYCODONE HYDROCHLORIDE 5 MG: 5 TABLET ORAL at 08:51

## 2022-02-20 RX ADMIN — SENNOSIDES AND DOCUSATE SODIUM 1 TABLET: 50; 8.6 TABLET ORAL at 08:50

## 2022-02-20 RX ADMIN — OXYCODONE HYDROCHLORIDE 5 MG: 5 TABLET ORAL at 01:26

## 2022-02-20 ASSESSMENT — ACTIVITIES OF DAILY LIVING (ADL)
ADLS_ACUITY_SCORE: 9
ADLS_ACUITY_SCORE: 11
ADLS_ACUITY_SCORE: 9
ADLS_ACUITY_SCORE: 11
ADLS_ACUITY_SCORE: 9

## 2022-02-20 NOTE — DISCHARGE SUMMARY
[facility]    Discharge Summary  Hospitalist    Date of Admission:  2/13/2022  Date of Discharge:  2/20/2022  Discharging Provider: Megha Brooks DO  Date of Service (when I saw the patient): 02/20/22    Discharge Diagnoses   Acute right hip fracture following a fall s/p ORIF on 2/14/2022  Acute blood loss anemia due to surgery  Essential hypertension  Major recurrent depressive disorder and anxiety    History of Present Illness   Kandis Tse is an 76 year old female who presented for right hip fracture after a fall.    Hospital Course   Kandis Tse was admitted on 2/13/2022.  The following problems were addressed during her hospitalization:    Acute right hip fracture following a fall s/p ORIF on 2/14/2022  Missed a step while walking down the stairs. XR pelvis and right hip on arrival revealed a comminuted intertrochanteric fracture of the right hip. Ortho was consulted and patient underwent uncomplicated ORIF by Dr. INO Carcamo on 2/14.   - Post-op cares as per Ortho  - WBAT RLE with walker, struggled with stairs in hospital and recommended TCU but she refused ([facility] had a bed and was waiting for insurance auth on 2/20), so discharging with home care  - Pain control with APAP 975 mg TID, oxycodone prn  - follow up with PCP 2/25  - follow up with [facility] 2 weeks post surgery (Dr Carcamo)     Acute blood loss anemia due to surgery  Hgb prior to surgery 13. Decreased to mid-9 range post surgery and stable there     Essential hypertension  - Continues on PTA carvedilol     Major recurrent depressive disorder and anxiety  - Continues on PTA citalopram    Megha Brooks DO    Significant Results and Procedures   2/14 ORIF right hip    Pending Results   NA    Code Status   Full Code       Primary Care Physician   Yoko Lopez    Physical Exam   Temp: 98.7  F (37.1  C) Temp src: Oral BP: 117/66 Pulse: 77   Resp: 16 SpO2: 95 % O2 Device: None (Room air)    Vitals:    02/13/22  1536   Weight: 63.5 kg (140 lb)     Vital Signs with Ranges  Temp:  [98.1  F (36.7  C)-98.7  F (37.1  C)] 98.7  F (37.1  C)  Pulse:  [77-85] 77  Resp:  [16] 16  BP: (111-118)/(59-66) 117/66  SpO2:  [93 %-95 %] 95 %  No intake/output data recorded.    Patient seen and examined. She is tearful, upset. She was told she was going home today, but also realizes she struggled with stairs. I went to discuss TCU option with her and she was upset because she wants to go home. Discussed patient with PT and PT discussed with patient worry about getting into the home with stairs. There is a valid safety concern with discharge to home, but patient does not want to go to TCU. I updated her that [facility] did have a bed for her and was awaiting insurance auth, but she prefers to go home instead.    Constitutional: Awake, alert, tearful, cooperative.  Eyes: Conjunctiva and pupils examined and normal.  HEENT: Moist mucous membranes, normal dentition.  Respiratory: Clear to auscultation bilaterally, no crackles or wheezing.  Cardiovascular: Regular rate and rhythm, normal S1 and S2, and no murmur noted.  GI: Soft, non-distended, non-tender, normal bowel sounds.  Lymph/Hematologic: No anterior cervical or supraclavicular adenopathy.  Skin: No rashes, no cyanosis, +edema on RLE. Ecchymosis right posterior thigh into calf. Dressing in place.  Musculoskeletal: No joint swelling, erythema or tenderness.  Neurologic: Cranial nerves 2-12 intact, normal strength and sensation.  Psychiatric: Alert, oriented to person, place and time, + tearful. anxiety    Discharge Disposition   Discharged to home  Condition at discharge: Fair    Consultations This Hospital Stay   ORTHOPEDIC SURGERY IP CONSULT  HOSPITALIST IP CONSULT  PHYSICAL THERAPY ADULT IP CONSULT  OCCUPATIONAL THERAPY ADULT IP CONSULT  CARE MANAGEMENT / SOCIAL WORK IP CONSULT    Time Spent on this Encounter   Megha CARREON DO, personally saw the patient today and spent greater than 30  minutes discharging this patient.    Discharge Orders      Primary Care - Care Coordination Referral      Home Care Referral      Reason for your hospital stay    Right hip fracture: IM Nail     Follow-up and recommended labs and tests    Follow-up with Dr INO Carcamo at [facility] 2 weeks following your surgery.   To arrange appointment or questions call: the care coordinator at [phone]  [facility] [place] phone number: [phone]  [facility] [place] phone number: [phone]     Activity    Your activity upon discharge: WBAT with walker.  Rest as needed.     When to contact your care team    Call your provider if you have any of the following: temperature greater than 101,  increased shortness of breath, increased drainage, redness or increasing calf pain/cramping.     Wound care and dressings    Instructions to care for your wound at home: Keep wound clean and dry.  Keep incision covered.  Able to shower over aquacel or tegaderm dressing.  Do not immerse.     Crutches DME    DME Documentation: Describe the reason for need to support medical necessity: Impaired gait status post hip surgery. I, the undersigned, certify that the above prescribed supplies are medically necessary for this patient and is both reasonable and necessary in reference to accepted standards of medical practice in the treatment of this patient's condition and is not prescribed as a convenience.     Cane DME    DME Documentation: Describe the reason for need to support medical necessity: Impaired gait status post hip surgery. I, the undersigned, certify that the above prescribed supplies are medically necessary for this patient and is both reasonable and necessary in reference to accepted standards of medical practice in the treatment of this patient's condition and is not prescribed as a convenience.     Walker DME    : DME Documentation: Describe the reason for need to support medical necessity: Impaired gait status post hip surgery. I, the  undersigned, certify that the above prescribed supplies are medically necessary for this patient and is both reasonable and necessary in reference to accepted standards of medical practice in the treatment of this patient's condition and is not prescribed as a convenience.     Commode Chair Order    DME Documentation:   Describe the reason for need to support medical necessity: needing assist with transfer, and urgency at night.     I, the undersigned, certify that the above prescribed supplies are medically necessary for this patient and is both reasonable and necessary in reference to accepted standards of medical and necessary in reference to accepted standards of medical practice in the treatment of this patient's condition and is not prescribed as a convenience.     Diet    Follow this diet upon discharge: Orders Placed This Encounter      Advance Diet as Tolerated: Regular Diet Adult     Discharge Medications   Current Discharge Medication List      START taking these medications    Details   acetaminophen (TYLENOL) 325 MG tablet Take 2 tablets (650 mg) by mouth every 4 hours as needed for other (For optimal non-opioid multimodal pain management to improve pain control.)  Qty: 30 tablet, Refills: 0    Associated Diagnoses: Closed displaced intertrochanteric fracture of right femur, initial encounter (H)      aspirin (ASA) 325 MG EC tablet Take 1 tablet (325 mg) by mouth daily  Qty: 30 tablet, Refills: 0    Associated Diagnoses: Closed displaced intertrochanteric fracture of right femur, initial encounter (H)      ondansetron (ZOFRAN-ODT) 4 MG ODT tab Take 1 tablet (4 mg) by mouth every 6 hours as needed for nausea or vomiting  Qty: 8 tablet, Refills: 0    Associated Diagnoses: Closed displaced intertrochanteric fracture of right femur, initial encounter (H)      oxyCODONE (ROXICODONE) 5 MG tablet Take 1-2 tablets (5-10 mg) by mouth every 4 hours as needed for moderate to severe pain  Qty: 30 tablet, Refills: 0     Associated Diagnoses: Closed displaced intertrochanteric fracture of right femur, initial encounter (H)      senna-docusate (SENOKOT-S/PERICOLACE) 8.6-50 MG tablet Take 1 tablet by mouth 2 times daily as needed for constipation  Qty: 20 tablet, Refills: 0    Associated Diagnoses: Closed displaced intertrochanteric fracture of right femur, initial encounter (H)         CONTINUE these medications which have CHANGED    Details   aspirin (ASA) 81 MG chewable tablet Take 1 tablet (81 mg) by mouth daily    Comments: Resume after 30 days of 325 mg daily completed  Associated Diagnoses: History of CVA (cerebrovascular accident)         CONTINUE these medications which have NOT CHANGED    Details   carvedilol (COREG) 6.25 MG tablet Take 1 tablet (6.25 mg) by mouth 2 times daily (with meals)  Qty: 180 tablet, Refills: 3    Comments: Profile Rx: patient will contact pharmacy when needed  Associated Diagnoses: Takotsubo cardiomyopathy      citalopram (CELEXA) 20 MG tablet Take 1 tablet (20 mg) by mouth daily  Qty: 90 tablet, Refills: 3    Comments: Profile Rx: patient will contact pharmacy when needed  Associated Diagnoses: Mood disorder (H)      melatonin 3 MG tablet Take 1 tablet (3 mg) by mouth nightly as needed for sleep    Associated Diagnoses: Insomnia, unspecified type         STOP taking these medications       naproxen sodium (ANAPROX) 220 MG tablet Comments:   Reason for Stopping:             Allergies   No Known Allergies  Data   Most Recent 3 CBC's:  Recent Labs   Lab Test 02/20/22  0726 02/17/22  0839 02/16/22  0728 02/15/22  1013 02/14/22  0834 02/13/22  1606 03/16/21  1230 03/26/19  1506   WBC  --   --   --   --  5.9 4.9  --  7.4   HGB 9.7*  --  9.7* 9.9* 11.5* 13.0   < > 14.4   MCV  --   --   --   --  102* 101*  --  88    148*  --   --  138* 158   < > 183    < > = values in this interval not displayed.      Most Recent 3 BMP's:  Recent Labs   Lab Test 02/20/22  0726 02/20/22  0550 02/20/22  0150  02/18/22  0807 02/15/22  0540 02/14/22  0834 02/13/22  1606 01/03/22  1155   NA  --   --   --   --   --  138 140 136   POTASSIUM  --   --   --   --   --  3.6 3.5 4.1   CHLORIDE  --   --   --   --   --  106 106 103   CO2  --   --   --   --   --  26 28 28   BUN  --   --   --   --   --  21 20 18   CR 0.63  --   --  0.60  --  0.57 0.69 0.77   ANIONGAP  --   --   --   --   --  6 6 5   GAVIN  --   --   --   --   --  8.4* 8.7 9.5   GLC  --  87 96  --  113* 97 91 92     Most Recent 2 LFT's:  Recent Labs   Lab Test 01/03/22  1155 10/16/20  1408   AST 27 32   ALT 38 42   ALKPHOS 69 81   BILITOTAL 0.7 0.6     Most Recent INR's and Anticoagulation Dosing History:  Anticoagulation Dose History    There is no flowsheet data to display.       Most Recent 3 Troponin's:No lab results found.  Most Recent Cholesterol Panel:  Recent Labs   Lab Test 01/03/22  1155   CHOL 211*   LDL 98   HDL 98   TRIG 76     Most Recent 6 Bacteria Isolates From Any Culture (See EPIC Reports for Culture Details):No lab results found.  Most Recent TSH, T4 and A1c Labs:No lab results found.  Results for orders placed or performed during the hospital encounter of 02/13/22   XR Pelvis w Hip Right 1 View    Narrative    EXAM: XR PELVIS AND HIP RIGHT 1 VIEW  LOCATION: [facility]  DATE/TIME: 2/13/2022 4:06 PM    INDICATION: Pain.  COMPARISON: None.      Impression    IMPRESSION: Comminuted intertrochanteric fracture of the right hip. Slight superolateral subluxation of the femoral shaft. No dislocation at the level of the hip joint. Postoperative changes of left total hip arthroplasty. There is some irregularity at   the junction of the left inferior pubic ramus and pubic body which is worrisome for fracture but this may represent a differing chronicity injury. Pelvis otherwise negative.   XR Surgery SUE L/T 5 Min Fluoro w Stills    Narrative    XR SURGERY C-ARM FLUOROSCOPY LESS THAN FIVE MINUTES WITH STILLS   2/14/2022 2:00 PM      HISTORY: Open reduction internal fixation (ORIF) right hip.     C-Arm #1. [id number]. 1 min and 6 sec fluoro time. 6 images. OR 33.  SKS/AC.    NUMBER OF IMAGES ACQUIRED: 6    FLUOROSCOPY TIME: 1.1 minute      Impression    IMPRESSION: Six intraoperative fluoroscopic spot images are provided  for review during reduction and nailing of right proximal femur  fracture with antegrade intramedullary nail and both proximal and  distal screws. See operative summary if further details are needed.    MIMA HARDING MD         SYSTEM ID:  [id number]   XR Knee Port Right 3 Views    Narrative    EXAM: XR KNEE PORTABLE RIGHT 3 VIEWS  LOCATION: [facility]  DATE/TIME: 02/19/2022, 9:47 AM    INDICATION: Right-sided knee pain after a fall.  COMPARISON: None.      Impression    IMPRESSION:  1.  No fracture or joint malalignment.  2.  Mild degenerative changes in the right knee patellofemoral compartment.  3.  No joint effusion.  4.  Intramedullary nail in the femur.

## 2022-02-20 NOTE — PLAN OF CARE
Goal Outcome Evaluation:    Plan of Care Reviewed With: patient     Overall Patient Progress: improving    Outcome Evaluation: Kandis is discharged and is going home.  PT VS stable, pt voiding and eating, dressing CDI, pain managed with oxy 5mg, pt needs gait belt, walker and 1 assist for toileting. Pt A&Ox4. AVS and medications and discharge instructions reviewed, all questions answered. All belongings sent with patient. Pt left floor via wheelchair, aide, and family member.

## 2022-02-20 NOTE — PLAN OF CARE
Goal Outcome Evaluation:    Plan of Care Reviewed With: patient     Overall Patient Progress: improving    Outcome Evaluation: Kandis is ready for discharge home. Will have HHC PT through [facility].    Pateint A&Maren4, VSS on RA. A1& walker , void BR. Oxycodone x1 for pain . Bruises on Rt thigh . Dressing dry drainage . Plan discharge to TCU

## 2022-02-20 NOTE — PROGRESS NOTES
"Orthopedic Surgery  Kandis Tse  2/20/2022  Admit Date:  2/13/2022  POD 6 Days Post-Op  S/P Procedure(s):  RIGHT INTERTROCHANTERIC FEMUR NAILING    Patient resting comfortably in bed.    Pain controlled but is holding her up when she tries to work with therapy, having pain in the right knee and anxious about it.  Tolerating oral intake.    Denies nausea or vomiting  Denies chest pain or shortness of breath  No events overnight.     Alert and orient to person, place, and time.  Vital Sign Ranges  /66 (BP Location: Right arm)   Pulse 77   Temp 98.7  F (37.1  C) (Oral)   Resp 16   Ht 1.727 m (5' 8\")   Wt 63.5 kg (140 lb)   SpO2 95%   BMI 21.29 kg/m      Dressings are clean, dry, and intact.   Minimal erythema of the surrounding skin.   Bilateral calves are soft, non-tender.  Bilateral lower extremity is NVI.  Sensation intact bilateral lower extremities  5/5 motor with resisted dorsi and plantar flexion bilaterally  +Dp pulse    Labs:  Hemoglobin   Date Value Ref Range Status   02/20/2022 9.7 (L) 11.7 - 15.7 g/dL Final   03/18/2021 8.8 (L) 11.7 - 15.7 g/dL Final     Platelet Count   Date Value Ref Range Status   02/20/2022 223 150 - 450 10e3/uL Final   03/16/2021 163 150 - 450 10e9/L Final       A/P  1. S/p right intertrochanteric femur fracture IM nail       Slow progress with therapies   Continue Lovenox for DVT prophylaxis.     Mobilize with PT/OT    WBAT with walker   Leave dressing intact.     Continue current pain regimen.    2. Disposition   Anticipate d/c to home based on navigation of stairs/PT today.    Timbo Smith PA-C    "

## 2022-02-20 NOTE — PLAN OF CARE
Goal Outcome Evaluation:    Plan of Care Reviewed With: patient   Alert and oriented. Pain is managed with PRN oxycodone. CMS intact. VSS on RA.     Overall Patient Progress: improving    Outcome Evaluation: Kandis is ready for discharge home. Will have HHC PT through [facility].

## 2022-02-20 NOTE — PROGRESS NOTES
Care Management Discharge Note    Discharge Date: 02/20/2022       Discharge Disposition: Home     Discharge Services: Home Care-Referral sent to [facility] (PT)    Discharge DME: Raised Toilet Seat, Walker    Discharge Transportation: family      Private pay costs discussed: Not applicable    PAS Confirmation Code:  N/A  Patient/family educated on Medicare website which has current facility and service quality ratings:  yes    Education Provided on the Discharge Plan: yes   Persons Notified of Discharge Plans: Patient, bedside RN Finn, [facility]  Patient/Family in Agreement with the Plan: yes    Handoff Referral Completed: Yes, Outpatient Care Coordination Referral    Additional Information:  Writer informed that patient is discharging today to home. Home Care referral had been sent and patient has been accepted.   Email sent to [facility] informing of discharge today. Spouse also informed of discharge plan. No further discharge needs.      Lianet Johnson RN  Care Coordinator  [phone]

## 2022-02-20 NOTE — PLAN OF CARE
Physical Therapy Discharge Summary    Reason for therapy discharge:    Discharged to home with home therapy.    Progress towards therapy goal(s). See goals on Care Plan in [facility] electronic health record for goal details.  Goals not met.  Barriers to achieving goals:   discharge from facility.    Therapy recommendation(s):    Continued therapy is recommended.  Rationale/Recommendations:  Pt discharging home with home PT to improve deficits in activity tolerance, balance, and IND with safety and functional mobility. PT recommended pt discharge to TCU as pt was unsteady when negotiating stairs but pt refused to discharge to TCU. Recommend pt has 24/7 A x 2 with functional mobility and ADLs.               Hpi Title: Evaluation of Skin Lesions

## 2022-02-20 NOTE — PROGRESS NOTES
Care Management Follow Up    Length of Stay (days): 7    Expected Discharge Date: 02/20/2022     Concerns to be Addressed:     Discharge planning  Patient plan of care discussed at interdisciplinary rounds: Yes    Anticipated Discharge Disposition: Home Care     Anticipated Discharge Services: None  Anticipated Discharge DME: Raised Toilet Seat, Walker    Patient/family educated on Medicare website which has current facility and service quality ratings:  N/A  Education Provided on the Discharge Plan:  N/A  Patient/Family in Agreement with the Plan: yes    Referrals Placed by CM/SW: TCU referrals  Private pay costs discussed: Not applicable    Additional Information:  Received a call back from [facility].  They can accept patient today.  Update patient's MD who states that patient is now refusing TCU.    Will continue to follow.      MARIETTA Lizama, [facility]    [phone]  [facility]

## 2022-02-21 ENCOUNTER — PATIENT OUTREACH (OUTPATIENT)
Dept: CARE COORDINATION | Facility: CLINIC | Age: 77
End: 2022-02-21
Payer: COMMERCIAL

## 2022-02-21 DIAGNOSIS — Z71.89 OTHER SPECIFIED COUNSELING: ICD-10-CM

## 2022-02-21 NOTE — PROGRESS NOTES
"Clinic Care Coordination Contact  [facility]: Post-Discharge Note  SITUATION                                                      Admission:    Admission Date: 02/13/22   Reason for Admission: Right hip fracture: IM Nail  Discharge:   Discharge Date: 02/20/22  Discharge Diagnosis: Right hip fracture: IM Nail    BACKGROUND                                                      Per hospital discharge summary and inpatient provider notes:    Kandis Tse is a 76 year old female with hx of HTN and depression/anxiety who presents for evaluation of right hip pain following a fall. The patient was wearing her reading glasses while walking down the stairs, misjudged a step, and fell onto her right side. She denies hitting her head or LOC. She denies history of weakness or falls. She denies fevers/chills or recent illnesses. She denies chest pain or shortness of breath.     ASSESSMENT      Enrollment  Primary Care Care Coordination Status: Declined    Discharge Assessment  How are you doing now that you are home?: \"Still have pain when I walk but that's because I have a bruise the size of [place]\"  How are your symptoms? (Red Flag symptoms escalate to triage hotline per guidelines): Improved  Do you feel your condition is stable enough to be safe at home until your provider visit?: Yes  Does the patient have their discharge instructions? : Yes  Does the patient have questions regarding their discharge instructions? : No  Were you started on any new medications or were there changes to any of your previous medications? : Yes  Does the patient have all of their medications?: No (see comment) (Patient stated she was supposed to have melatonin and it wasn't in the bag of medications. Patient doesn't need them but doesn't want to get charged.)  Do you have questions regarding any of your medications? : Yes (see comment)  Do you have all of your needed medical supplies or equipment (DME)?  (i.e. oxygen tank, CPAP, " cane, etc.): Yes  Discharge follow-up appointment scheduled within 14 calendar days? : Yes  Discharge Follow Up Appointment Date: 02/25/22                  PLAN                                                      Outpatient Plan:  Follow-up with Dr INO Carcamo at [facility] 2 weeks following your surgery.  To arrange appointment or questions call: the care coordinator at [phone]  [facility] [place] phone number: [phone]  [facility] [place] phone number: [phone]    Future Appointments   Date Time Provider Department Center   2/25/2022 11:00 AM Merline Castillo, KELLIE CNP [facility]         For any urgent concerns, please contact our 24 hour nurse triage line: [phone] ([phone])         Shelly Herrera  Community Health Worker  Connected Care Resource [facility]  [phone]

## 2022-02-22 ENCOUNTER — TELEPHONE (OUTPATIENT)
Dept: PEDIATRICS | Facility: CLINIC | Age: 77
End: 2022-02-22
Payer: COMMERCIAL

## 2022-02-22 NOTE — TELEPHONE ENCOUNTER
Pt LM yesterday at 2:53 pm requesting a call back at [phone].     Called pt back. Pt says that she is recovering well from the surgery & has post-op appointment with  in 2 weeks. So, she would like to cancel the hospital f/u with pcp if not needed(she already cancelled).    Advised pt to keep her 2 weeks post-op appointment with the surgeon. Call us back with any questions. Pt agrees to the plan.     History: Pt was in the hospital following a fall with R hip fracture on 2/13/22. Patient underwent uncomplicated ORIF by Dr. INO Carcamo on 2/14, advised f/u with [facility] 2 weeks post surgery. Had an appointment with Merline Castillo for hospital f/u on 2/25 & it got cancelled.     Jeffrey RN  Patient Advocate Liason (PAL)  [facility]

## 2022-02-23 ENCOUNTER — TELEPHONE (OUTPATIENT)
Dept: PEDIATRICS | Facility: CLINIC | Age: 77
End: 2022-02-23
Payer: COMMERCIAL

## 2022-02-23 NOTE — TELEPHONE ENCOUNTER
Noelle from [facility] called and would like a verbal order for Kandis to have an RN added to her home care referral to eval and treat as she can get a nurse out sooner than a PT. Please call back Noelle at [phone].  Lynn Mortensen LPN

## 2022-02-23 NOTE — TELEPHONE ENCOUNTER
Call placed to Noelle lea for home care orders    Thank you  Daija Ferrer RN on 2/23/2022 at 12:18 PM

## 2022-02-26 ENCOUNTER — MEDICAL CORRESPONDENCE (OUTPATIENT)
Dept: HEALTH INFORMATION MANAGEMENT | Facility: CLINIC | Age: 77
End: 2022-02-26
Payer: COMMERCIAL

## 2022-02-28 ENCOUNTER — TELEPHONE (OUTPATIENT)
Dept: PEDIATRICS | Facility: CLINIC | Age: 77
End: 2022-02-28
Payer: COMMERCIAL

## 2022-02-28 NOTE — TELEPHONE ENCOUNTER
The Home Care/Assisted Living/Nursing Facility is calling regarding an established patient.  Has the patient seen Home Care in the past or is currently residing in Assisted Living or Nursing Facility? Yes.     Genie(PT) calling from [facility] requesting the following orders that are within the Home Care, Assisted Living or Nursing Home Eval and Treatment standing order and can be signed as standing order signature required by RN.    Preferred Call Back Number: [phone]    PT/OT/Speech Therapy  Genie did PT eval today. Requesting orders for one visit this week, twice a week x 2 weeks, once a week x 4 weeks.    Any additional Orders:  Any order requested not stated above are outside of the standing order and must be routed to a licensed practitioner for approval.    No    Writer has verified Requestor will send fax to have orders signed.    IVÁN Murphy  Patient Advocate Liason (PAL)  [facility]

## 2022-03-03 ENCOUNTER — TELEPHONE (OUTPATIENT)
Dept: PEDIATRICS | Facility: CLINIC | Age: 77
End: 2022-03-03
Payer: COMMERCIAL

## 2022-03-03 NOTE — TELEPHONE ENCOUNTER
Sharri Schneider for verbal orders for home care?    Thank you  Daija Ferrer RN on 3/3/2022 at 10:24 AM

## 2022-03-03 NOTE — TELEPHONE ENCOUNTER
Call placed to home care RN with verbal okay for nursing home care orders  Left detailed message on VM    Thank you  Daija Ferrer RN on 3/3/2022 at 11:22 AM

## 2022-03-03 NOTE — TELEPHONE ENCOUNTER
Reason for Call:  Other orders    Detailed comments: Jenelle from [facility] called today and would like verbal orders from Amaya Gomez at [facility] for the folllowin)  Skilled nursing 1 x a week for 2 weeks.  2)  Skilled nursing thereafter every other week for the next 6 weeks.    Please contact her back.  Thank you.    Phone Number Patient can be reached at: Other phone number:  [phone] Jenelle *    Best Time: any    Can we leave a detailed message on this number? YES    Call taken on 3/3/2022 at 10:04 AM by Rosalina Suero

## 2022-03-09 DIAGNOSIS — Z53.9 DIAGNOSIS NOT YET DEFINED: Primary | ICD-10-CM

## 2022-03-09 PROCEDURE — G0180 MD CERTIFICATION HHA PATIENT: HCPCS | Performed by: INTERNAL MEDICINE

## 2022-03-21 ENCOUNTER — TRANSFERRED RECORDS (OUTPATIENT)
Dept: HEALTH INFORMATION MANAGEMENT | Facility: CLINIC | Age: 77
End: 2022-03-21
Payer: COMMERCIAL

## 2022-04-06 DIAGNOSIS — Z87.891 FORMER TOBACCO USE: ICD-10-CM

## 2022-04-06 DIAGNOSIS — Z00.00 HEALTHCARE MAINTENANCE: ICD-10-CM

## 2022-04-06 DIAGNOSIS — M81.0 OSTEOPOROSIS: ICD-10-CM

## 2022-04-06 DIAGNOSIS — Z87.81 HISTORY OF FRACTURE: ICD-10-CM

## 2022-04-06 DIAGNOSIS — Z47.89 AFTERCARE FOLLOWING SURGERY OF THE MUSCULOSKELETAL SYSTEM: Primary | ICD-10-CM

## 2022-04-06 DIAGNOSIS — Z90.710 HISTORY OF HYSTERECTOMY: ICD-10-CM

## 2022-04-11 ENCOUNTER — ANCILLARY PROCEDURE (OUTPATIENT)
Dept: BONE DENSITY | Facility: CLINIC | Age: 77
End: 2022-04-11
Attending: NURSE PRACTITIONER
Payer: COMMERCIAL

## 2022-04-11 DIAGNOSIS — Z87.81 HISTORY OF FRACTURE: ICD-10-CM

## 2022-04-11 DIAGNOSIS — Z82.62 FAMILY HISTORY OF OSTEOPOROSIS: ICD-10-CM

## 2022-04-11 DIAGNOSIS — Z00.00 HEALTHCARE MAINTENANCE: ICD-10-CM

## 2022-04-11 DIAGNOSIS — Z90.710 HISTORY OF HYSTERECTOMY: ICD-10-CM

## 2022-04-11 DIAGNOSIS — Z87.891 FORMER TOBACCO USE: ICD-10-CM

## 2022-04-11 DIAGNOSIS — M81.0 OSTEOPOROSIS: ICD-10-CM

## 2022-04-11 DIAGNOSIS — Z98.890 STATUS POST SURGERY: ICD-10-CM

## 2022-04-11 PROCEDURE — 77080 DXA BONE DENSITY AXIAL: CPT | Performed by: INTERNAL MEDICINE

## 2022-04-12 ENCOUNTER — LAB (OUTPATIENT)
Dept: LAB | Facility: CLINIC | Age: 77
End: 2022-04-12
Payer: COMMERCIAL

## 2022-04-12 DIAGNOSIS — Z00.00 HEALTHCARE MAINTENANCE: ICD-10-CM

## 2022-04-12 DIAGNOSIS — Z87.891 FORMER TOBACCO USE: ICD-10-CM

## 2022-04-12 DIAGNOSIS — M81.0 OSTEOPOROSIS: ICD-10-CM

## 2022-04-12 DIAGNOSIS — Z90.710 HISTORY OF HYSTERECTOMY: ICD-10-CM

## 2022-04-12 DIAGNOSIS — Z87.81 HISTORY OF FRACTURE: ICD-10-CM

## 2022-04-12 DIAGNOSIS — Z47.89 AFTERCARE FOLLOWING SURGERY OF THE MUSCULOSKELETAL SYSTEM: ICD-10-CM

## 2022-04-12 LAB
ERYTHROCYTE [DISTWIDTH] IN BLOOD BY AUTOMATED COUNT: 11.9 % (ref 10–15)
HCT VFR BLD AUTO: 25.2 % (ref 35–47)
HGB BLD-MCNC: 7.8 G/DL (ref 11.7–15.7)
MCH RBC QN AUTO: 29.8 PG (ref 26.5–33)
MCHC RBC AUTO-ENTMCNC: 31 G/DL (ref 31.5–36.5)
MCV RBC AUTO: 96 FL (ref 78–100)
PLATELET # BLD AUTO: 246 10E3/UL (ref 150–450)
RBC # BLD AUTO: 2.62 10E6/UL (ref 3.8–5.2)
T3FREE SERPL-MCNC: 2.5 PG/ML (ref 2.3–4.2)
WBC # BLD AUTO: 4.9 10E3/UL (ref 4–11)

## 2022-04-12 PROCEDURE — 36415 COLL VENOUS BLD VENIPUNCTURE: CPT

## 2022-04-12 PROCEDURE — 84439 ASSAY OF FREE THYROXINE: CPT | Mod: GA

## 2022-04-12 PROCEDURE — 84481 FREE ASSAY (FT-3): CPT

## 2022-04-12 PROCEDURE — 85027 COMPLETE CBC AUTOMATED: CPT

## 2022-04-12 PROCEDURE — 84443 ASSAY THYROID STIM HORMONE: CPT | Mod: GA

## 2022-04-12 PROCEDURE — 82306 VITAMIN D 25 HYDROXY: CPT

## 2022-04-12 PROCEDURE — 84100 ASSAY OF PHOSPHORUS: CPT

## 2022-04-13 LAB
DEPRECATED CALCIDIOL+CALCIFEROL SERPL-MC: 17 UG/L (ref 20–75)
PHOSPHATE SERPL-MCNC: 3.8 MG/DL (ref 2.5–4.5)
T4 FREE SERPL-MCNC: 0.99 NG/DL (ref 0.76–1.46)
TSH SERPL DL<=0.005 MIU/L-ACNC: 1.78 MU/L (ref 0.4–4)

## 2022-04-14 ENCOUNTER — HOSPITAL ENCOUNTER (EMERGENCY)
Facility: CLINIC | Age: 77
Discharge: HOME | End: 2022-04-14
Payer: COMMERCIAL

## 2022-04-14 ENCOUNTER — TELEPHONE (OUTPATIENT)
Dept: PEDIATRICS | Facility: CLINIC | Age: 77
End: 2022-04-14
Payer: COMMERCIAL

## 2022-04-14 VITALS
OXYGEN SATURATION: 100 % | SYSTOLIC BLOOD PRESSURE: 131 MMHG | BODY MASS INDEX: 23.67 KG/M2 | WEIGHT: 155.65 LBS | DIASTOLIC BLOOD PRESSURE: 68 MMHG | RESPIRATION RATE: 20 BRPM | TEMPERATURE: 98.3 F | HEART RATE: 99 BPM

## 2022-04-14 NOTE — ED TRIAGE NOTES
Patient presents with low hemoglobin and concerns for a GIB. Had lab work done on Tuesday. Nurse practitioner called her today and said she was concerned about her hgb at 7.8, which has dropped since last checked. Last week on Thursday patient had 3-4 episodes of black emesis and then tarry black stools for a few days. States she is no longer having black stools and has not had an emesis for a week. Takes a baby ASA.

## 2022-04-14 NOTE — TELEPHONE ENCOUNTER
Called and spoke with patient. She declines to go back to the Emergency Room at this time. We did discuss that I am concerned about the possibility of a GI bleed, and while this is currently stable could have the potential to get worse without warning and could be catastrophic, particularly as asa and and NSAIDs were just held this morning. We also discussed that most recent Hgb was 2 days ago and could continue to be downtrending, which would necessitate a transfusion.    She did offer to go back to the ER tomorrow AM when it would be less crowded, but given tachycardia of HR 99 on carvedilol in setting of recent Hgb of 7.8 we discussed that this would not be a safe option. She is willing to go to the [facility] to have her vitals rechecked and labs drawn, and effectively be more appropriately triaged. We did discuss that it is likely that the recommendation from this visit would be to go back to the ER, and if this is the case that I strongly recommend that she do so as she needs urgent scope, PPI and potential transfusion pending her Hgb.     Called and spoke with [facility] physician Dr. Martin who is aware of the patient.     Yoko Conde MD  Internal Medicine-Pediatrics

## 2022-04-14 NOTE — TELEPHONE ENCOUNTER
I think patient needs to go to the ER, unfortunately. I don't have vitals to review, and it sounds like she recently had an upper GI bleed. It's possible that this is a slow bleed, and she does need an urgent EGD at this point. However, without vitals and repeat Hgb (as this lab appears to be 2 days old), she needs urgent evaluation and repeat stat labs.    Yoko Conde MD  Internal Medicine-Pediatrics

## 2022-04-14 NOTE — TELEPHONE ENCOUNTER
Dr. Conde-  She vomited blood (black) and had tarry stool last week TH. Just lasted that day. She has been fine since then. Sometimes she feels dizzy when she gets up from sitting. But has a cane and is very conscious about being careful. She is recovering from surgery and feels good.     She had blood work done on Monday ordered by the surgeon's office. NP advised to call regarding her hgb.     Aundrea Vogel RN on 4/14/2022 at 10:38 AM          Component      Latest Ref Rng & Units 4/12/2022   WBC      4.0 - 11.0 10e3/uL 4.9   RBC Count      3.80 - 5.20 10e6/uL 2.62 (L)   Hemoglobin      11.7 - 15.7 g/dL 7.8 (LL)   Hematocrit      35.0 - 47.0 % 25.2 (L)   MCV      78 - 100 fL 96   MCH      26.5 - 33.0 pg 29.8   MCHC      31.5 - 36.5 g/dL 31.0 (L)   RDW      10.0 - 15.0 % 11.9   Platelet Count      150 - 450 10e3/uL 246

## 2022-04-14 NOTE — TELEPHONE ENCOUNTER
Patient calling back. She says the hospital is full and they told her it would be a 4 hour wait. She is not willing to do that. She is recovering from her surgery and can't sit in a waiting room that long.  She says the nurse there told her they would probably just send her to GI anyway so she left. She would like an urgent appointment in GI.     What do you recommend from here. Aundrea Vogel RN on 4/14/2022 at 2:01 PM

## 2022-04-15 DIAGNOSIS — Z00.00 ROUTINE GENERAL MEDICAL EXAMINATION AT A HEALTH CARE FACILITY: ICD-10-CM

## 2022-04-15 DIAGNOSIS — Z87.81 FRACTURE, HEALED: ICD-10-CM

## 2022-04-15 DIAGNOSIS — M81.0 SENILE OSTEOPOROSIS: ICD-10-CM

## 2022-04-15 DIAGNOSIS — Z98.890 PERSONAL HISTORY OF SURGERY TO HEART AND GREAT VESSELS, PRESENTING HAZARDS TO HEALTH: Primary | ICD-10-CM

## 2022-04-18 ENCOUNTER — TELEPHONE (OUTPATIENT)
Dept: PEDIATRICS | Facility: CLINIC | Age: 77
End: 2022-04-18
Payer: COMMERCIAL

## 2022-04-18 DIAGNOSIS — R35.0 URINE FREQUENCY: Primary | ICD-10-CM

## 2022-04-18 NOTE — TELEPHONE ENCOUNTER
I see that pt went to UR on 4/14 as recommended by . Hgb was stable & she was rx'ed iron supplement once a day and sen home.  would like pt to get EGD & Colonoscopy to know the reason for low hgb. Also, recommends VV with  today.     Called pt at [phone]. Pt says that her  had breathing issues this morning, so she had to call for an ambulance, He was just taken to the hospital, she will leave soon to be with him.    1. GI bleed:   Says that she haven't picked up the iron supplement yet, but planning to do so either today or tomorrow. Denies any blood in stool, vomiting blood, dizziness, lethargy or fatigue. UR told her that she can restart baby aspirin & aleve. She has an appointment with GI this Wed(4/20).     Advised pt to stay off of baby aspirin & Aleve as much as possible until we find the reason for her low hgb. Also, advised her to keep her existing GI appointment. Pt agrees to the plan.     2. Nocturia:  Has been urinating almost every 2 hrs at night for the past 2 weeks. Denies any UTI sx's. Tried eliminating water intake after 7 pm which haven't been helping. Denies increased thirst or lethargy.    Advised her to leave urine sample either in the hospital out-pt lab or here in [place] clinic lab when she is able to r/o UTI. UA has been ordered. Pt agrees with this plan.     agrees & approves the above plan of care.    Jeffrey RN  Patient Advocate Liason (PAL)  [facility]

## 2022-04-20 ENCOUNTER — TRANSFERRED RECORDS (OUTPATIENT)
Dept: HEALTH INFORMATION MANAGEMENT | Facility: CLINIC | Age: 77
End: 2022-04-20
Payer: COMMERCIAL

## 2022-04-20 NOTE — TELEPHONE ENCOUNTER
Pt haven't dropped off the urine sample yet. She might've met with GI today.     Called pt back at [phone] to get an update, not available, so LM to call me back when she gets a chance.     Jeffrey, RN  Patient Advocate Liason (PAL)  [facility]

## 2022-04-21 ENCOUNTER — LAB (OUTPATIENT)
Dept: LAB | Facility: CLINIC | Age: 77
End: 2022-04-21
Payer: COMMERCIAL

## 2022-04-21 DIAGNOSIS — R35.0 URINE FREQUENCY: Primary | ICD-10-CM

## 2022-04-21 DIAGNOSIS — R35.0 URINE FREQUENCY: ICD-10-CM

## 2022-04-21 LAB
ALBUMIN UR-MCNC: NEGATIVE MG/DL
APPEARANCE UR: CLEAR
BILIRUB UR QL STRIP: NEGATIVE
COLOR UR AUTO: YELLOW
GLUCOSE UR STRIP-MCNC: NEGATIVE MG/DL
HGB UR QL STRIP: NEGATIVE
KETONES UR STRIP-MCNC: NEGATIVE MG/DL
LEUKOCYTE ESTERASE UR QL STRIP: NEGATIVE
NITRATE UR QL: NEGATIVE
PH UR STRIP: 7.5 [PH] (ref 5–7)
SP GR UR STRIP: 1.01 (ref 1–1.03)
UROBILINOGEN UR STRIP-ACNC: 0.2 E.U./DL

## 2022-04-21 PROCEDURE — 87086 URINE CULTURE/COLONY COUNT: CPT

## 2022-04-21 PROCEDURE — 81003 URINALYSIS AUTO W/O SCOPE: CPT

## 2022-04-21 RX ORDER — OMEPRAZOLE 40 MG/1
1 CAPSULE, DELAYED RELEASE ORAL
COMMUNITY
Start: 2022-04-20 | End: 2023-03-09

## 2022-04-21 NOTE — TELEPHONE ENCOUNTER
Needs phone/video or OV to discuss urinary symptoms and management.    Yoko Conde MD  Internal Medicine-Pediatrics

## 2022-04-21 NOTE — TELEPHONE ENCOUNTER
How soon do you want her seen? Noting avail today in clinic. Aundrea Vogel, RN on 4/21/2022 at 1:28 PM

## 2022-04-21 NOTE — TELEPHONE ENCOUNTER
Called patient back. Patient calling with updates:    GI - notes in chart   - Suspected ulcer  - started on omeprazole (reconciled)  - endoscopy next week     Nocturia  - worse last night, up every hour   - full voiding  - no pain, no pelvic pain, no other UTI sx   - UA negative today     Routing to PCP to update:  For Nocturia: - need for referral? Urology, OAB, pelvic PT? Or phone or OV?

## 2022-04-21 NOTE — TELEPHONE ENCOUNTER
"9:11 4/21/22 Voicemail    \"Luis Ruiz, this is Kandis Tse. [birth year]\" then the phone disconnected.    Nae Brice on 4/21/2022 at 9:50 AM    "

## 2022-04-21 NOTE — TELEPHONE ENCOUNTER
MA/TC: Please call pt to set up an appt for Friday or Monday in person or virtual    Thank you  Daija Ferrer RN on 4/21/2022 at 4:06 PM

## 2022-04-23 LAB — BACTERIA UR CULT: NORMAL

## 2022-04-25 ENCOUNTER — VIRTUAL VISIT (OUTPATIENT)
Dept: PEDIATRICS | Facility: CLINIC | Age: 77
End: 2022-04-25
Payer: COMMERCIAL

## 2022-04-25 DIAGNOSIS — R19.5 HARD STOOL: ICD-10-CM

## 2022-04-25 DIAGNOSIS — N32.81 OAB (OVERACTIVE BLADDER): ICD-10-CM

## 2022-04-25 DIAGNOSIS — N39.41 URGENCY INCONTINENCE: Primary | ICD-10-CM

## 2022-04-25 PROCEDURE — 99213 OFFICE O/P EST LOW 20 MIN: CPT | Mod: 95 | Performed by: NURSE PRACTITIONER

## 2022-04-25 RX ORDER — OXYBUTYNIN CHLORIDE 5 MG/1
5 TABLET, EXTENDED RELEASE ORAL DAILY
Qty: 90 TABLET | Refills: 0 | Status: SHIPPED | OUTPATIENT
Start: 2022-04-25 | End: 2022-07-28

## 2022-04-25 NOTE — PATIENT INSTRUCTIONS
It was nice seeing you today.    Please let me know if you have any questions regarding today's visit!    Take care,    LADONNA Le DNP  Family Medicine

## 2022-04-25 NOTE — PROGRESS NOTES
Kandis is a 76 year old who is being evaluated via a billable telephone visit.      What phone number would you like to be contacted at? [phone]  How would you like to obtain your AVS? Geoffrey    Assessment & Plan     Urgency incontinence  Worsening.  Started on medication today and referred to PT.  Can increase medication if needed.  Patient to follow-up if medication adjustment is needed  - oxybutynin ER (DITROPAN-XL) 5 MG 24 hr tablet; Take 1 tablet (5 mg) by mouth daily  - Physical Therapy Referral; Future    OAB (overactive bladder)    Hard stool  Worsening. Discussed hard stools/constipation and OAB.  Currently taking iron.  Take stool softener daily and laxative PRN.  Can do bowel clean out if needed.      Return if symptoms worsen or fail to improve.    Cassia Le NP  [facility]    Fan Marquis is a 76 year old who presents for the following health issues:    HPI     Urinary symptoms:  -Present for a couple of months  -Urinary frequency especially at night  -Urinary urgency also present  -Abraham dysuria  -Has been screened for UTI but negative 4/21/22.  -Has limited fluids to help but unsuccessful.     BM daily.  Hard.  Taking iron currently.      Review of Systems   Constitutional, HEENT, cardiovascular, pulmonary, gi and gu systems are negative, except as otherwise noted.      Objective       Vitals:  No vitals were obtained today due to virtual visit.    Physical Exam   healthy, alert and no distress  PSYCH: Alert and oriented times 3; coherent speech, normal   rate and volume, able to articulate logical thoughts, able   to abstract reason, no tangential thoughts, no hallucinations   or delusions  Her affect is normal  RESP: No cough, no audible wheezing, able to talk in full sentences  Remainder of exam unable to be completed due to telephone visits          Phone call duration: 11 minutes

## 2022-04-27 ENCOUNTER — TRANSFERRED RECORDS (OUTPATIENT)
Dept: HEALTH INFORMATION MANAGEMENT | Facility: CLINIC | Age: 77
End: 2022-04-27
Payer: COMMERCIAL

## 2022-05-02 ENCOUNTER — TRANSFERRED RECORDS (OUTPATIENT)
Dept: HEALTH INFORMATION MANAGEMENT | Facility: CLINIC | Age: 77
End: 2022-05-02
Payer: COMMERCIAL

## 2022-05-19 ENCOUNTER — THERAPY VISIT (OUTPATIENT)
Dept: PHYSICAL THERAPY | Facility: CLINIC | Age: 77
End: 2022-05-19
Attending: NURSE PRACTITIONER
Payer: COMMERCIAL

## 2022-05-19 DIAGNOSIS — N39.41 URGENCY INCONTINENCE: ICD-10-CM

## 2022-05-19 DIAGNOSIS — R39.15 URINARY URGENCY: ICD-10-CM

## 2022-05-19 PROCEDURE — 97161 PT EVAL LOW COMPLEX 20 MIN: CPT | Mod: GP | Performed by: PHYSICAL THERAPIST

## 2022-05-19 PROCEDURE — 97535 SELF CARE MNGMENT TRAINING: CPT | Mod: GP | Performed by: PHYSICAL THERAPIST

## 2022-05-19 NOTE — PROGRESS NOTES
[facility]    OUTPATIENT Physical Therapy ORTHOPEDIC EVALUATION  PLAN OF TREATMENT FOR OUTPATIENT REHABILITATION  (COMPLETE FOR INITIAL CLAIMS ONLY)  Patient's Last Name, First Name, M.I.  YOB: 1945  Kandis Tse    Provider s Name:  RUBI [facility]   Medical Record No.  [id number]   Start of Care Date:  05/19/22   Onset Date:   04/25/22 (MD visit)   Type:     _X__PT   ___OT Medical Diagnosis:    Encounter Diagnoses   Name Primary?     Urgency incontinence      Urinary urgency         Treatment Diagnosis:  Urinary Urgency        Goals:     05/19/22 0700   Voiding Patterns   Previous Functional Level No problems   Current Functional Level Times during the night that patient voids   Peformance level 2-3   STG Target Performance Reduce times a night that patient voids to    Performance Level 1   Rationale to establish restorative sleep pattern   Due Date 06/09/22   LTG Target Performance Reduce times a night that patient voids to    Performance Level 0   Rationale to establish restorative sleep pattern   Due Date 06/30/22       Therapy Frequency:  once per week  Predicted Duration of Therapy Intervention:  6 weeks    Jovana Lund PT                 I CERTIFY THE NEED FOR THESE SERVICES FURNISHED UNDER        THIS PLAN OF TREATMENT AND WHILE UNDER MY CARE     (Physician attestation of this document indicates review and certification of the therapy plan).                     Certification Date From:  05/19/22   Certification Date To:  08/16/22    Referring Provider:  Cassia Le    Initial Assessment        See Epic Evaluation SOC Date: 05/19/22

## 2022-05-19 NOTE — PROGRESS NOTES
Physical Therapy Initial Evaluation  Subjective:  SUBJECTIVE:    Patient reports onset of symptoms over the past few months.   Symptoms include pt had hip surgery in February after a fall, now has been having increased urgency and frequency, especially at night. No issues with hesitancy or pain. Pt now on medication, has helped a little bit, voiding less at night but still more than normal. Strong urge when she sees a toilet, denies leaking   Since onset symptoms have been getting better, worse or staying the same? Getting a little better      Urination:  Do you leak on the way to the bathroom or with a strong urge to void? Yes    Do you leak with cough,sneeze, jumping, running? No   Any other activities that cause leaking? no  Do you have triggers that make you feel you can't wait to go to the bathroom? Yes , seeing the toilet, getting home  Type of pad and number used per day? 0  When you leak what is the amount? small  How often do you typically void? Once every hour    How long can you delay the need to urinate? 1 - 2 minutes.   How many times do you get up to urinate at night? 2-3  Can you stop the flow of urine when on the toilet? No  Is the volume of urine passed usually: medium. (8sec rule=  250ml with average bladder storing  400-600ml)    Do you strain to pass urine? No  Do you have a slow or hesitant urinary stream? No  Do you have difficulty initiating the urine stream? No    How many bladder infections have you had in last 12 months? 0    Fluid intake (one glass is 8oz or one cup) 3 glasses/day  1 caffinated glasses/day  0 alcohol glasses/day.    Bowel habits:  Frequency of bowel movements? daily   Consistancy of stool? hard  Do you ignore the urge to defecate? No  Do you strain to pass stool? No    Pelvic Pain:  When do you have pelvic pain? no  Are you sexually active? Yes  Is initial penetration during intercourse painful? No  Is deeper penetration painful? No  Do you use lubricant? yes     Vane  Scale: 1   (Level 3: Abstinence from intercourse because of severe pain. Level 2: Painful intercourse which limites frequency of activity. Level 1: Painful intercourse not severe enough to prevent activity.)    Birth History:   Given birth: Yes   Complications: no   Vaginal deliveries:  2  C-sections:  0  Episiotomies: 2  Have you ever been worried for your physical safety? No   History of abdominal or pelvic surgeries? Bladder sling/bladder repair after her first baby    Regular exercise:  Pt currently does weight training and balance exercises, walking her new puppy (still recovering from hip surgery)   Practiced the PF(kegel) exercises for 4 or more weeks? no        Pt declines pelvic exam today, receptive to urge suppression techniques. Pt going to [facility] for hip PT s/p surgery earlier this year.      The history is provided by the patient.   Patient Health History  Kandis Tse being seen for Urgency and Frequency.     Problem began: 4/25/2022 (MD visit).      Pain is reported as 0/10 on pain scale.  General health as reported by patient is good.  Pertinent medical history includes: anemia, cancer, heart problems, migraines/headaches, osteoporosis and stroke.     Medical allergies: none.   Surgeries include:  Orthopedic surgery. Other surgery history details: hip replacement and hip IMN.    Current medications:  Anti-depressants and cardiac medication.    Current occupation is Retired  .   Primary job tasks include:  Driving and prolonged standing.                                    Objective:  System    Physical Exam    General     ROS    Assessment/Plan:    Patient is a 76 year old female with pelvic complaints.    Patient has the following significant findings with corresponding treatment plan.                Diagnosis 1:  Urinary urgency  Decreased strength - therapeutic exercise, therapeutic activities and home program  Impaired muscle performance - neuro re-education and home  program  Decreased function - therapeutic activities and home program      Cumulative Therapy Evaluation is: Low complexity.    Previous and current functional limitations:  (See Goal Flow Sheet for this information)    Short term and Long term goals: (See Goal Flow Sheet for this information)     Communication ability:  Patient appears to be able to clearly communicate and understand verbal and written communication and follow directions correctly.  Treatment Explanation - The following has been discussed with the patient:   RX ordered/plan of care  Anticipated outcomes  Possible risks and side effects  This patient would benefit from PT intervention to resume normal activities.   Rehab potential is good.    Frequency:  1 X week, once daily  Duration:  for 6 weeks  Discharge Plan:  Achieve all LTG.  Independent in home treatment program.  Reach maximal therapeutic benefit.    Please refer to the daily flowsheet for treatment today, total treatment time and time spent performing 1:1 timed codes.

## 2022-05-31 ENCOUNTER — TRANSFERRED RECORDS (OUTPATIENT)
Dept: HEALTH INFORMATION MANAGEMENT | Facility: CLINIC | Age: 77
End: 2022-05-31
Payer: COMMERCIAL

## 2022-06-30 PROBLEM — R39.15 URINARY URGENCY: Status: RESOLVED | Noted: 2022-05-19 | Resolved: 2022-06-30

## 2022-07-27 DIAGNOSIS — N39.41 URGENCY INCONTINENCE: ICD-10-CM

## 2022-07-28 RX ORDER — OXYBUTYNIN CHLORIDE 5 MG/1
5 TABLET, EXTENDED RELEASE ORAL DAILY
Qty: 90 TABLET | Refills: 0 | Status: SHIPPED | OUTPATIENT
Start: 2022-07-28 | End: 2022-11-22

## 2022-09-16 ENCOUNTER — TELEPHONE (OUTPATIENT)
Dept: PEDIATRICS | Facility: CLINIC | Age: 77
End: 2022-09-16

## 2022-09-16 NOTE — TELEPHONE ENCOUNTER
Pt LM today at 10:11 am requesting call back to help schedule an appointment for px. Call back at [phone].     TC:  I see that pt had Medicare AWV on 1/3/22, so please help her to schedule an appointment after 1/4/23. Thanks.     Sara RN  Patient Advocate Liason (PAL)  [facility]  Ph. [phone] / Fax. [phone]

## 2022-10-04 ENCOUNTER — ANCILLARY PROCEDURE (OUTPATIENT)
Dept: MAMMOGRAPHY | Facility: CLINIC | Age: 77
End: 2022-10-04
Attending: INTERNAL MEDICINE
Payer: COMMERCIAL

## 2022-10-04 DIAGNOSIS — Z12.31 VISIT FOR SCREENING MAMMOGRAM: ICD-10-CM

## 2022-10-04 PROCEDURE — 77067 SCR MAMMO BI INCL CAD: CPT | Mod: TC | Performed by: RADIOLOGY

## 2022-10-04 PROCEDURE — 77063 BREAST TOMOSYNTHESIS BI: CPT | Mod: TC | Performed by: RADIOLOGY

## 2022-11-02 DIAGNOSIS — L70.0 ACNE VULGARIS: Primary | ICD-10-CM

## 2022-11-03 ENCOUNTER — LAB (OUTPATIENT)
Dept: LAB | Facility: CLINIC | Age: 77
End: 2022-11-03
Payer: COMMERCIAL

## 2022-11-03 DIAGNOSIS — D22.9 MULTIPLE MELANOCYTIC NEVUS: ICD-10-CM

## 2022-11-03 DIAGNOSIS — L81.4 OTHER MELANIN HYPERPIGMENTATION: ICD-10-CM

## 2022-11-03 DIAGNOSIS — L57.0 ACTINIC KERATOSIS: Primary | ICD-10-CM

## 2022-11-03 DIAGNOSIS — L70.0 ACNE VULGARIS: ICD-10-CM

## 2022-11-03 DIAGNOSIS — L81.4 LENTIGO SIMPLEX: ICD-10-CM

## 2022-11-03 LAB
ANION GAP SERPL CALCULATED.3IONS-SCNC: 7 MMOL/L (ref 3–14)
BUN SERPL-MCNC: 22 MG/DL (ref 7–30)
BUN SERPL-MCNC: 23 MG/DL (ref 7–30)
CALCIUM SERPL-MCNC: 9.4 MG/DL (ref 8.5–10.1)
CHLORIDE BLD-SCNC: 104 MMOL/L (ref 94–109)
CO2 SERPL-SCNC: 27 MMOL/L (ref 20–32)
CREAT SERPL-MCNC: 0.73 MG/DL (ref 0.52–1.04)
CREAT SERPL-MCNC: 0.77 MG/DL (ref 0.52–1.04)
GFR SERPL CREATININE-BSD FRML MDRD: 79 ML/MIN/1.73M2
GFR SERPL CREATININE-BSD FRML MDRD: 84 ML/MIN/1.73M2
GLUCOSE BLD-MCNC: 80 MG/DL (ref 70–99)
POTASSIUM BLD-SCNC: 4.1 MMOL/L (ref 3.4–5.3)
POTASSIUM BLD-SCNC: 4.1 MMOL/L (ref 3.4–5.3)
SODIUM SERPL-SCNC: 138 MMOL/L (ref 133–144)

## 2022-11-03 PROCEDURE — 84132 ASSAY OF SERUM POTASSIUM: CPT

## 2022-11-03 PROCEDURE — 84520 ASSAY OF UREA NITROGEN: CPT

## 2022-11-03 PROCEDURE — 82565 ASSAY OF CREATININE: CPT

## 2022-11-03 PROCEDURE — 36415 COLL VENOUS BLD VENIPUNCTURE: CPT

## 2022-11-03 PROCEDURE — 80048 BASIC METABOLIC PNL TOTAL CA: CPT | Mod: 59

## 2022-11-30 ENCOUNTER — TELEPHONE (OUTPATIENT)
Dept: PEDIATRICS | Facility: CLINIC | Age: 77
End: 2022-11-30

## 2022-11-30 NOTE — TELEPHONE ENCOUNTER
Pt LM at 8:36 am requesting a call back at [phone]. Called pt back to get details.     She has been experiencing worsening GERD sx's(burning sensation in the mid chest, burping, discomfort in chest after food intake & dull achy abdominal pain after food intake. Denies vomiting, blood in vomit/stool, diarrhea, constipation, chest pain/tightness, SOB, pain radiating to arm/neck/back, dizziness or nausea.     Was experiencing GERD sx's once in a while with greasy food intake, but now the discomfort is almost every day. Also, she stopped omeprazole about 6 months ago, she is not sure why she stopped the med.     Scheduled an OV with Sherrie Collazo next week, advised to restart omeprazole, take bland diet in smaller quantity more often, eat meals 1-2 hrs prior to bed time and monitor closely. Pt agrees to the plan.     IVÁN Ruiz  Patient Advocate Liason (PAL)  [facility]  Ph. [phone] / Fax. [phone]

## 2022-12-07 ENCOUNTER — OFFICE VISIT (OUTPATIENT)
Dept: PEDIATRICS | Facility: CLINIC | Age: 77
End: 2022-12-07
Payer: COMMERCIAL

## 2022-12-07 VITALS
DIASTOLIC BLOOD PRESSURE: 66 MMHG | OXYGEN SATURATION: 99 % | WEIGHT: 150.6 LBS | RESPIRATION RATE: 16 BRPM | BODY MASS INDEX: 22.82 KG/M2 | HEART RATE: 68 BPM | SYSTOLIC BLOOD PRESSURE: 100 MMHG | HEIGHT: 68 IN | TEMPERATURE: 98.2 F

## 2022-12-07 DIAGNOSIS — Z86.16 HISTORY OF COVID-19: Primary | ICD-10-CM

## 2022-12-07 DIAGNOSIS — K21.9 GASTROESOPHAGEAL REFLUX DISEASE WITHOUT ESOPHAGITIS: ICD-10-CM

## 2022-12-07 DIAGNOSIS — J18.9 ATYPICAL PNEUMONIA: ICD-10-CM

## 2022-12-07 DIAGNOSIS — R04.2 HEMOPTYSIS: ICD-10-CM

## 2022-12-07 PROCEDURE — 99214 OFFICE O/P EST MOD 30 MIN: CPT | Performed by: NURSE PRACTITIONER

## 2022-12-07 ASSESSMENT — PATIENT HEALTH QUESTIONNAIRE - PHQ9
10. IF YOU CHECKED OFF ANY PROBLEMS, HOW DIFFICULT HAVE THESE PROBLEMS MADE IT FOR YOU TO DO YOUR WORK, TAKE CARE OF THINGS AT HOME, OR GET ALONG WITH OTHER PEOPLE: NOT DIFFICULT AT ALL
SUM OF ALL RESPONSES TO PHQ QUESTIONS 1-9: 0
SUM OF ALL RESPONSES TO PHQ QUESTIONS 1-9: 0

## 2022-12-07 ASSESSMENT — PAIN SCALES - GENERAL: PAINLEVEL: NO PAIN (0)

## 2022-12-07 NOTE — PROGRESS NOTES
"  Assessment & Plan     History of COVID-19  >10 day since pos test/symptom onset. No residual symptoms and feels much better.    Hemoptysis  Resolved, reason for her visit. Likely from cough/infection.    Atypical pneumonia  Finished doxy, lungs clear and no ongoing resp symptoms.    Gastroesophageal reflux disease without esophagitis  Symptoms had worsened during acute illness but now back to baseline. On PPI. Had been followed at [facility].      Patient Instructions   Get your pneumonia shot at your pharmacy    Return as needed      Return in about 1 month (around 1/7/2023).    Sherrie Collazo NP  [facility] JOSE Marquis is a 77 year old, presenting for the following health issues:  ER F/U      HPI     ED/UC Followup:    Facility:  [facility]  Date of visit: 11/27/2022  Reason for visit: COVID-19 (Primary Dx);   Hemoptysis;   Atypical pneumonia  Current Status: Feeling much better, but has concerns of Gerd due to bleeding ulcer in past    [facility] visit on 11/27/22 for hemoptysis; POS COVID. Given decadron, inhaler, and abx for possible pneumonia.  Feels much better  No longer having  Hemoptysis    Past couple of weeks she will drink water and vomit up her water, hasn't had this happen in the past 7-8 days     Review of Systems         Objective    /66   Pulse 68   Temp 98.2  F (36.8  C) (Oral)   Resp 16   Ht 1.727 m (5' 8\")   Wt 68.3 kg (150 lb 9.6 oz)   SpO2 99%   BMI 22.90 kg/m    Body mass index is 22.9 kg/m .  Physical Exam   GENERAL: healthy, alert and no distress  EYES: Eyes grossly normal to inspection,  HENT: ear canals and TM's normal  RESP: lungs clear to auscultation - no rales, rhonchi or wheezes  CV: regular rate and rhythm, normal S1 S2, no S3 or S4, no murmur, click or rub                  Answers for HPI/ROS submitted by the patient on 12/7/2022  If you checked off any problems, how difficult have these problems made it for you to do your work, " take care of things at home, or get along with other people?: Not difficult at all  PHQ9 TOTAL SCORE: 0

## 2023-01-18 ENCOUNTER — TELEPHONE (OUTPATIENT)
Dept: PEDIATRICS | Facility: CLINIC | Age: 78
End: 2023-01-18
Payer: COMMERCIAL

## 2023-01-18 NOTE — TELEPHONE ENCOUNTER
Pt LM at 9:23 am requesting a call back at [phone].    Called pt back to get details. Pt says that she had an appointment for AWV with  on 1/9/23 which she needed to cancel. So, requesting to reschedule for AWV.    Scheduled an OV with  for AWV with fasting labs on 3/9/23 at 9:40 am.    IVÁN Ruiz  Patient Advocate Liason (PAL)  [facility]  Ph. [phone] / Fax. [phone]

## 2023-01-25 DIAGNOSIS — J18.9 ATYPICAL PNEUMONIA: ICD-10-CM

## 2023-01-25 RX ORDER — ALBUTEROL SULFATE 90 UG/1
2 AEROSOL, METERED RESPIRATORY (INHALATION) PRN
Qty: 8.5 G | Refills: 0 | Status: SHIPPED | OUTPATIENT
Start: 2023-01-25 | End: 2023-03-09

## 2023-01-25 NOTE — TELEPHONE ENCOUNTER
Prescription approved per [facility] Refill Protocol.    Yoko Basurto RN   PAL (Patient Advocate Liason)  [facility]

## 2023-02-22 DIAGNOSIS — I51.81 TAKOTSUBO CARDIOMYOPATHY: ICD-10-CM

## 2023-02-23 RX ORDER — CARVEDILOL 6.25 MG/1
6.25 TABLET ORAL 2 TIMES DAILY WITH MEALS
Qty: 180 TABLET | Refills: 0 | Status: SHIPPED | OUTPATIENT
Start: 2023-02-23 | End: 2023-03-09

## 2023-02-23 NOTE — TELEPHONE ENCOUNTER
Prescription approved per [facility] Refill Protocol.    Goldie KASPER RN   [facility]

## 2023-03-09 ENCOUNTER — OFFICE VISIT (OUTPATIENT)
Dept: PEDIATRICS | Facility: CLINIC | Age: 78
End: 2023-03-09
Payer: COMMERCIAL

## 2023-03-09 VITALS
HEIGHT: 67 IN | WEIGHT: 142.5 LBS | HEART RATE: 82 BPM | DIASTOLIC BLOOD PRESSURE: 68 MMHG | OXYGEN SATURATION: 98 % | BODY MASS INDEX: 22.37 KG/M2 | SYSTOLIC BLOOD PRESSURE: 114 MMHG | TEMPERATURE: 97.7 F | RESPIRATION RATE: 18 BRPM

## 2023-03-09 DIAGNOSIS — M81.0 SENILE OSTEOPOROSIS: ICD-10-CM

## 2023-03-09 DIAGNOSIS — I25.10 CORONARY ARTERY DISEASE INVOLVING NATIVE HEART WITHOUT ANGINA PECTORIS, UNSPECIFIED VESSEL OR LESION TYPE: ICD-10-CM

## 2023-03-09 DIAGNOSIS — D62 ANEMIA DUE TO BLOOD LOSS, ACUTE: ICD-10-CM

## 2023-03-09 DIAGNOSIS — I51.81 TAKOTSUBO CARDIOMYOPATHY: ICD-10-CM

## 2023-03-09 DIAGNOSIS — Z13.220 SCREENING FOR HYPERLIPIDEMIA: ICD-10-CM

## 2023-03-09 DIAGNOSIS — N39.41 URGENCY INCONTINENCE: ICD-10-CM

## 2023-03-09 DIAGNOSIS — Z00.00 ENCOUNTER FOR MEDICARE ANNUAL WELLNESS EXAM: Primary | ICD-10-CM

## 2023-03-09 DIAGNOSIS — F39 MOOD DISORDER (H): ICD-10-CM

## 2023-03-09 DIAGNOSIS — R05.9 COUGH, UNSPECIFIED TYPE: ICD-10-CM

## 2023-03-09 DIAGNOSIS — E55.9 VITAMIN D DEFICIENCY: ICD-10-CM

## 2023-03-09 PROBLEM — S72.001A CLOSED RIGHT HIP FRACTURE (H): Status: RESOLVED | Noted: 2022-02-13 | Resolved: 2023-03-09

## 2023-03-09 PROBLEM — S72.002A CLOSED FRACTURE OF LEFT HIP, INITIAL ENCOUNTER (H): Status: RESOLVED | Noted: 2022-02-13 | Resolved: 2023-03-09

## 2023-03-09 LAB
ALBUMIN SERPL BCG-MCNC: 4.3 G/DL (ref 3.5–5.2)
ALP SERPL-CCNC: 80 U/L (ref 35–104)
ALT SERPL W P-5'-P-CCNC: 14 U/L (ref 10–35)
ANION GAP SERPL CALCULATED.3IONS-SCNC: 13 MMOL/L (ref 7–15)
AST SERPL W P-5'-P-CCNC: 25 U/L (ref 10–35)
BILIRUB SERPL-MCNC: 0.5 MG/DL
BUN SERPL-MCNC: 13.4 MG/DL (ref 8–23)
CALCIUM SERPL-MCNC: 9.9 MG/DL (ref 8.8–10.2)
CHLORIDE SERPL-SCNC: 105 MMOL/L (ref 98–107)
CHOLEST SERPL-MCNC: 208 MG/DL
CREAT SERPL-MCNC: 1.05 MG/DL (ref 0.51–0.95)
DEPRECATED HCO3 PLAS-SCNC: 26 MMOL/L (ref 22–29)
ERYTHROCYTE [DISTWIDTH] IN BLOOD BY AUTOMATED COUNT: 12.3 % (ref 10–15)
GFR SERPL CREATININE-BSD FRML MDRD: 54 ML/MIN/1.73M2
GLUCOSE SERPL-MCNC: 89 MG/DL (ref 70–99)
HCT VFR BLD AUTO: 44.5 % (ref 35–47)
HDLC SERPL-MCNC: 58 MG/DL
HGB BLD-MCNC: 14.8 G/DL (ref 11.7–15.7)
LDLC SERPL CALC-MCNC: 127 MG/DL
MCH RBC QN AUTO: 30.8 PG (ref 26.5–33)
MCHC RBC AUTO-ENTMCNC: 33.3 G/DL (ref 31.5–36.5)
MCV RBC AUTO: 93 FL (ref 78–100)
NONHDLC SERPL-MCNC: 150 MG/DL
PLATELET # BLD AUTO: 254 10E3/UL (ref 150–450)
POTASSIUM SERPL-SCNC: 4.6 MMOL/L (ref 3.4–5.3)
PROT SERPL-MCNC: 7 G/DL (ref 6.4–8.3)
RBC # BLD AUTO: 4.8 10E6/UL (ref 3.8–5.2)
SODIUM SERPL-SCNC: 144 MMOL/L (ref 136–145)
TRIGL SERPL-MCNC: 117 MG/DL
WBC # BLD AUTO: 5.9 10E3/UL (ref 4–11)

## 2023-03-09 PROCEDURE — 91312 COVID-19 VACCINE BIVALENT BOOSTER 12+ (PFIZER): CPT | Performed by: INTERNAL MEDICINE

## 2023-03-09 PROCEDURE — 82306 VITAMIN D 25 HYDROXY: CPT | Performed by: INTERNAL MEDICINE

## 2023-03-09 PROCEDURE — G0009 ADMIN PNEUMOCOCCAL VACCINE: HCPCS | Performed by: INTERNAL MEDICINE

## 2023-03-09 PROCEDURE — 0124A COVID-19 VACCINE BIVALENT BOOSTER 12+ (PFIZER): CPT | Performed by: INTERNAL MEDICINE

## 2023-03-09 PROCEDURE — 36415 COLL VENOUS BLD VENIPUNCTURE: CPT | Performed by: INTERNAL MEDICINE

## 2023-03-09 PROCEDURE — 85027 COMPLETE CBC AUTOMATED: CPT | Performed by: INTERNAL MEDICINE

## 2023-03-09 PROCEDURE — 90677 PCV20 VACCINE IM: CPT | Performed by: INTERNAL MEDICINE

## 2023-03-09 PROCEDURE — 80053 COMPREHEN METABOLIC PANEL: CPT | Performed by: INTERNAL MEDICINE

## 2023-03-09 PROCEDURE — 80061 LIPID PANEL: CPT | Performed by: INTERNAL MEDICINE

## 2023-03-09 PROCEDURE — 99213 OFFICE O/P EST LOW 20 MIN: CPT | Mod: 25 | Performed by: INTERNAL MEDICINE

## 2023-03-09 PROCEDURE — G0439 PPPS, SUBSEQ VISIT: HCPCS | Performed by: INTERNAL MEDICINE

## 2023-03-09 RX ORDER — ALBUTEROL SULFATE 90 UG/1
2 AEROSOL, METERED RESPIRATORY (INHALATION) PRN
Qty: 8.5 G | Refills: 1 | Status: SHIPPED | OUTPATIENT
Start: 2023-03-09

## 2023-03-09 RX ORDER — SPIRONOLACTONE 100 MG/1
1 TABLET, FILM COATED ORAL DAILY
COMMUNITY
Start: 2023-02-28

## 2023-03-09 RX ORDER — OXYBUTYNIN CHLORIDE 5 MG/1
5 TABLET, EXTENDED RELEASE ORAL DAILY
Qty: 90 TABLET | Refills: 3 | Status: SHIPPED | OUTPATIENT
Start: 2023-03-09 | End: 2024-06-12

## 2023-03-09 RX ORDER — ALENDRONATE SODIUM 70 MG/1
70 TABLET ORAL
Qty: 12 TABLET | Refills: 3 | Status: SHIPPED | OUTPATIENT
Start: 2023-03-09 | End: 2024-03-18

## 2023-03-09 RX ORDER — CARVEDILOL 6.25 MG/1
6.25 TABLET ORAL 2 TIMES DAILY WITH MEALS
Qty: 180 TABLET | Refills: 3 | Status: SHIPPED | OUTPATIENT
Start: 2023-03-09

## 2023-03-09 RX ORDER — CITALOPRAM HYDROBROMIDE 20 MG/1
20 TABLET ORAL DAILY
Qty: 90 TABLET | Refills: 3 | Status: SHIPPED | OUTPATIENT
Start: 2023-03-09

## 2023-03-09 SDOH — ECONOMIC STABILITY: TRANSPORTATION INSECURITY
IN THE PAST 12 MONTHS, HAS LACK OF TRANSPORTATION KEPT YOU FROM MEETINGS, WORK, OR FROM GETTING THINGS NEEDED FOR DAILY LIVING?: NO

## 2023-03-09 SDOH — ECONOMIC STABILITY: FOOD INSECURITY: WITHIN THE PAST 12 MONTHS, THE FOOD YOU BOUGHT JUST DIDN'T LAST AND YOU DIDN'T HAVE MONEY TO GET MORE.: NEVER TRUE

## 2023-03-09 SDOH — ECONOMIC STABILITY: INCOME INSECURITY: IN THE LAST 12 MONTHS, WAS THERE A TIME WHEN YOU WERE NOT ABLE TO PAY THE MORTGAGE OR RENT ON TIME?: NO

## 2023-03-09 SDOH — HEALTH STABILITY: PHYSICAL HEALTH: ON AVERAGE, HOW MANY DAYS PER WEEK DO YOU ENGAGE IN MODERATE TO STRENUOUS EXERCISE (LIKE A BRISK WALK)?: 0 DAYS

## 2023-03-09 SDOH — ECONOMIC STABILITY: INCOME INSECURITY: HOW HARD IS IT FOR YOU TO PAY FOR THE VERY BASICS LIKE FOOD, HOUSING, MEDICAL CARE, AND HEATING?: NOT HARD AT ALL

## 2023-03-09 SDOH — HEALTH STABILITY: PHYSICAL HEALTH: ON AVERAGE, HOW MANY MINUTES DO YOU ENGAGE IN EXERCISE AT THIS LEVEL?: 0 MIN

## 2023-03-09 SDOH — ECONOMIC STABILITY: FOOD INSECURITY: WITHIN THE PAST 12 MONTHS, YOU WORRIED THAT YOUR FOOD WOULD RUN OUT BEFORE YOU GOT MONEY TO BUY MORE.: NEVER TRUE

## 2023-03-09 SDOH — ECONOMIC STABILITY: TRANSPORTATION INSECURITY
IN THE PAST 12 MONTHS, HAS THE LACK OF TRANSPORTATION KEPT YOU FROM MEDICAL APPOINTMENTS OR FROM GETTING MEDICATIONS?: NO

## 2023-03-09 ASSESSMENT — SOCIAL DETERMINANTS OF HEALTH (SDOH)
HOW OFTEN DO YOU GET TOGETHER WITH FRIENDS OR RELATIVES?: ONCE A WEEK
IN A TYPICAL WEEK, HOW MANY TIMES DO YOU TALK ON THE PHONE WITH FAMILY, FRIENDS, OR NEIGHBORS?: THREE TIMES A WEEK
DO YOU BELONG TO ANY CLUBS OR ORGANIZATIONS SUCH AS CHURCH GROUPS UNIONS, FRATERNAL OR ATHLETIC GROUPS, OR SCHOOL GROUPS?: NO
HOW OFTEN DO YOU ATTEND CHURCH OR RELIGIOUS SERVICES?: NEVER

## 2023-03-09 ASSESSMENT — ENCOUNTER SYMPTOMS
NERVOUS/ANXIOUS: 0
SORE THROAT: 0
ABDOMINAL PAIN: 0
ARTHRALGIAS: 0
HEMATURIA: 0
SHORTNESS OF BREATH: 1
MYALGIAS: 0
CONSTIPATION: 0
BREAST MASS: 0
DYSURIA: 0
FEVER: 0
COUGH: 1
DIARRHEA: 0
HEMATOCHEZIA: 0
HEADACHES: 0
NAUSEA: 0
PALPITATIONS: 0
FREQUENCY: 0
DIZZINESS: 1
PARESTHESIAS: 0
EYE PAIN: 0
JOINT SWELLING: 0
HEARTBURN: 1
WEAKNESS: 0
CHILLS: 0

## 2023-03-09 ASSESSMENT — LIFESTYLE VARIABLES
AUDIT-C TOTAL SCORE: 0
HOW MANY STANDARD DRINKS CONTAINING ALCOHOL DO YOU HAVE ON A TYPICAL DAY: PATIENT DOES NOT DRINK
SKIP TO QUESTIONS 9-10: 1
HOW OFTEN DO YOU HAVE A DRINK CONTAINING ALCOHOL: NEVER
HOW OFTEN DO YOU HAVE SIX OR MORE DRINKS ON ONE OCCASION: NEVER

## 2023-03-09 ASSESSMENT — PAIN SCALES - GENERAL: PAINLEVEL: NO PAIN (0)

## 2023-03-09 ASSESSMENT — ACTIVITIES OF DAILY LIVING (ADL): CURRENT_FUNCTION: NO ASSISTANCE NEEDED

## 2023-03-09 NOTE — PROGRESS NOTES
"SUBJECTIVE:   Kandis is a 77 year old who presents for Preventive Visit.  Patient has been advised of split billing requirements and indicates understanding: Yes  Are you in the first 12 months of your Medicare coverage?  No    Healthy Habits:     In general, how would you rate your overall health?  Good    Frequency of exercise:  4-5 days/week    Duration of exercise:  15-30 minutes    Do you usually eat at least 4 servings of fruit and vegetables a day, include whole grains    & fiber and avoid regularly eating high fat or \"junk\" foods?  Yes    Taking medications regularly:  Yes    Medication side effects:  None    Ability to successfully perform activities of daily living:  No assistance needed    Home Safety:  No safety concerns identified    Hearing Impairment:  No hearing concerns    In the past 6 months, have you been bothered by leaking of urine?  No    In general, how would you rate your overall mental or emotional health?  Excellent      PHQ-2 Total Score: 0    Additional concerns today:  No      Have you ever done Advance Care Planning? (For example, a Health Directive, POLST, or a discussion with a medical provider or your loved ones about your wishes): No, advance care planning information given to patient to review.  Patient plans to discuss their wishes with loved ones or provider.         Fall risk  Fallen 2 or more times in the past year?: No  Any fall with injury in the past year?: No    Cognitive Screening   1) Repeat 3 items (Leader, Season, Table)    2) Clock draw: NORMAL  3) 3 item recall: Recalls 3 objects  Results: 3 items recalled: COGNITIVE IMPAIRMENT LESS LIKELY    Mini-CogTM Copyright S Patrice. Licensed by the author for use in [facility]; reprinted with permission (peter@.[facility]). All rights reserved.      Was previously on Fosamax for osteoporosis a long time ago. Can't recall the last time she was taking this.     Otherwise doing well and staying active, tolerating her " medications without issues.     Reviewed and updated as needed this visit by clinical staff   Tobacco  Allergies  Meds              Reviewed and updated as needed this visit by Provider     Meds             Social History     Tobacco Use     Smoking status: Former     Smokeless tobacco: Never   Substance Use Topics     Alcohol use: No         Alcohol Use 3/9/2023   Prescreen: >3 drinks/day or >7 drinks/week? No               Current providers sharing in care for this patient include:   Patient Care Team:  Yoko Conde MD as PCP - General (Internal Medicine)  Yoko Conde MD as Assigned PCP    The following health maintenance items are reviewed in Epic and correct as of today:  Health Maintenance   Topic Date Due     URINE DRUG SCREEN  Never done     ANNUAL REVIEW OF HM ORDERS  Never done     Pneumococcal Vaccine: 65+ Years (2 - PPSV23 if available, else PCV20) 01/12/2017     COVID-19 Vaccine (5 - Booster for Pfizer series) 07/16/2022     MEDICARE ANNUAL WELLNESS VISIT  01/03/2023     LIPID  01/03/2023     FALL RISK ASSESSMENT  03/09/2024     ADVANCE CARE PLANNING  01/03/2027     DTAP/TDAP/TD IMMUNIZATION (3 - Td or Tdap) 01/03/2032     DEXA  04/11/2037     PHQ-2 (once per calendar year)  Completed     INFLUENZA VACCINE  Completed     ZOSTER IMMUNIZATION  Completed     IPV IMMUNIZATION  Aged Out     MENINGITIS IMMUNIZATION  Aged Out     HEPATITIS C SCREENING  Discontinued     MAMMO SCREENING  Discontinued     COLORECTAL CANCER SCREENING  Discontinued     LUNG CANCER SCREENING  Discontinued     Patient Active Problem List   Diagnosis     Claw toe, acquired     Takotsubo cardiomyopathy     Anxiety     Osteopenia of multiple sites     Mood disorder (H)     Primary localized osteoarthritis of left hip     CAD (coronary artery disease)     History of CVA (cerebrovascular accident)     Past Surgical History:   Procedure Laterality Date     ARTHROPLASTY HIP Left 3/16/2021    Procedure: Left total hip  arthroplasty (Peterson and Nephew 52 mm acetabulum; #10 A-Fit Anthology high offset stem; -2 neck 36 mm head);  Surgeon: Dante Becker MD;  Location: [facility] OR     BIOPSY BREAST Left Early 1990's     BREAST SURGERY      breast biopsy, breast lumpectomy     COSMETIC SURGERY      augmentation mammaplasty     GYN SURGERY      hysterectomy     HYSTERECTOMY  Early 1990's     IR THORACIC VERTEBROPLASTY  10/11/2016     LAPAROSCOPIC CHOLECYSTECTOMY WITH CHOLANGIOGRAMS N/A 3/12/2023    Procedure: CHOLECYSTECTOMY, LAPAROSCOPIC, WITH CHOLANGIOGRAM;  Surgeon: Clay Cohen MD;  Location: [facility] OR     LUMPECTOMY BREAST Left Early 1990's     MAMMOPLASTY AUGMENTATION Bilateral Late 1990's     OPEN REDUCTION INTERNAL FIXATION HIP NAILING Right 2/14/2022    Procedure: RIGHT INTERTROCHANTERIC FEMUR NAILING;  Surgeon: Karthik Carcamo MD;  Location: [facility] OR     ORTHOPEDIC SURGERY Bilateral     bunionectomy/10 toes repaired     OTHER SURGICAL HISTORY      lumpectomy     REPAIR HAMMER TOE Bilateral 4/18/2018    Procedure: REPAIR HAMMER TOE;  RIGHT SECOND, FOURTH, AND FIFTH  MALLET TOE RECONSTRUCTION WITH LEFT THIRD CLAWTOE RECONSTRUCTION (CHOICE)  ;  Surgeon: Edgar Carlisle MD;  Location:  OR     WRIST SURGERY Left      ZZC TOTAL ABDOM HYSTERECTOMY      Description: Hysterectomy;  Recorded: 06/17/2013;       Social History     Tobacco Use     Smoking status: Former     Smokeless tobacco: Never   Substance Use Topics     Alcohol use: No     Family History   Problem Relation Age of Onset     Breast Cancer Mother         Post Menopausal     Dementia Father            Mammogram Screening - Patient over age 75, has elected to continue with screening.  Pertinent mammograms are reviewed under the imaging tab.    Review of Systems   Constitutional: Negative for chills and fever.   HENT: Negative for congestion, ear pain, hearing loss and sore throat.    Eyes: Negative for pain and visual disturbance.   Respiratory: Positive for cough  "and shortness of breath.    Cardiovascular: Negative for chest pain, palpitations and peripheral edema.   Gastrointestinal: Positive for heartburn. Negative for abdominal pain, constipation, diarrhea, hematochezia and nausea.   Breasts:  Negative for tenderness, breast mass and discharge.   Genitourinary: Negative for dysuria, frequency, genital sores, hematuria, pelvic pain, urgency, vaginal bleeding and vaginal discharge.   Musculoskeletal: Negative for arthralgias, joint swelling and myalgias.   Skin: Negative for rash.   Neurological: Positive for dizziness. Negative for weakness, headaches and paresthesias.   Psychiatric/Behavioral: Negative for mood changes. The patient is not nervous/anxious.        OBJECTIVE:   /68 (BP Location: Right arm, Patient Position: Sitting, Cuff Size: Adult Small)   Pulse 82   Temp 97.7  F (36.5  C) (Tympanic)   Resp 18   Ht 1.708 m (5' 7.25\")   Wt 64.6 kg (142 lb 8 oz)   LMP  (LMP Unknown)   SpO2 98%   BMI 22.15 kg/m   Estimated body mass index is 22.9 kg/m  as calculated from the following:    Height as of 12/7/22: 1.727 m (5' 8\").    Weight as of 12/7/22: 68.3 kg (150 lb 9.6 oz).  Physical Exam  GENERAL: healthy, alert and no distress  EYES: Eyes grossly normal to inspection, PERRL and conjunctivae and sclerae normal  HENT: ear canals and TM's normal, nose and mouth without ulcers or lesions  NECK: no adenopathy, no asymmetry, masses, or scars and thyroid normal to palpation  RESP: lungs clear to auscultation - no rales, rhonchi or wheezes  CV: regular rate and rhythm, normal S1 S2, no S3 or S4, no murmur, click or rub, no peripheral edema and peripheral pulses strong  ABDOMEN: soft, nontender, no hepatosplenomegaly, no masses and bowel sounds normal  MS: no gross musculoskeletal defects noted, no edema  SKIN: no suspicious lesions or rashes  NEURO: Normal strength and tone, mentation intact and speech normal  PSYCH: mentation appears normal, affect " normal/bright    Diagnostic Test Results:  Labs reviewed in Epic    ASSESSMENT / PLAN:   1. Encounter for Medicare annual wellness exam  Counseling as below.     2. Screening for hyperlipidemia  - Lipid panel reflex to direct LDL Non-fasting    3. Coronary artery disease involving native heart without angina pectoris, unspecified vessel or lesion type  Likely will benefit from considering stating in future given hx of CVA and cardiomyopathy.   - Lipid panel reflex to direct LDL Non-fasting    4. Takotsubo cardiomyopathy  No acute issues, tolerating beta blocker well.   - carvedilol (COREG) 6.25 MG tablet; Take 1 tablet (6.25 mg) by mouth 2 times daily (with meals)  Dispense: 180 tablet; Refill: 3  - Comprehensive metabolic panel (BMP + Alb, Alk Phos, ALT, AST, Total. Bili, TP); Future  - Comprehensive metabolic panel (BMP + Alb, Alk Phos, ALT, AST, Total. Bili, TP)    5. Mood disorder (H)  Symptoms stable and well managed with current medications, no side effects.   - citalopram (CELEXA) 20 MG tablet; Take 1 tablet (20 mg) by mouth daily  Dispense: 90 tablet; Refill: 3    6. Urgency incontinence  Uses oxybutynin with good results.   - oxybutynin ER (DITROPAN XL) 5 MG 24 hr tablet; Take 1 tablet (5 mg) by mouth daily  Dispense: 90 tablet; Refill: 3    7. Senile osteoporosis  Noted on recent DEXA, discussed restarting bisphosphonate. May need to consider IV if not tolerated in future, particularly in setting of GI bleed prior.   - alendronate (FOSAMAX) 70 MG tablet; Take 1 tablet (70 mg) by mouth every 7 days (Patient not taking: Reported on 3/11/2023)  Dispense: 12 tablet; Refill: 3    8. Vitamin D deficiency  Due for recheck.   - Vitamin D Deficiency    9. Anemia due to blood loss, acute  Secondary to gastric ulcer, follows with GI. Due for Hgb recheck.   - CBC with platelets    10. Cough, unspecified type  Uses albuterol as needed with respiratory illnesses.   - albuterol (PROAIR HFA/PROVENTIL HFA/VENTOLIN HFA) 108  (90 Base) MCG/ACT inhaler; Inhale 2 puffs into the lungs as needed for shortness of breath or wheezing  Dispense: 8.5 g; Refill: 1            COUNSELING:  Reviewed preventive health counseling, as reflected in patient instructions       Regular exercise       Healthy diet/nutrition       Vision screening       Hearing screening       Bladder control        She reports that she has quit smoking. She has never used smokeless tobacco.      Appropriate preventive services were discussed with this patient, including applicable screening as appropriate for cardiovascular disease, diabetes, osteopenia/osteoporosis, and glaucoma.  As appropriate for age/gender, discussed screening for colorectal cancer, prostate cancer, breast cancer, and cervical cancer. Checklist reviewing preventive services available has been given to the patient.    Reviewed patients plan of care and provided an AVS. The Basic Care Plan (routine screening as documented in Health Maintenance) for Kandis meets the Care Plan requirement. This Care Plan has been established and reviewed with the Patient.          Yoko Lopez MD  [facility]    Identified Health Risks:

## 2023-03-09 NOTE — PATIENT INSTRUCTIONS
Medications refilled.    Labs today to follow-up on things.    For osteoporosis please start Fosamax once weekly. Let me know if you have issues with bone pain or epigastric pain.     Patient Education   Personalized Prevention Plan  You are due for the preventive services outlined below.  Your care team is available to assist you in scheduling these services.  If you have already completed any of these items, please share that information with your care team to update in your medical record.  Health Maintenance Due   Topic Date Due    URINE DRUG SCREEN  Never done    ANNUAL REVIEW OF HM ORDERS  Never done    Pneumococcal Vaccine (2 - PPSV23 if available, else PCV20) 01/12/2017    COVID-19 Vaccine (5 - Booster for Pfizer series) 07/16/2022    PHQ-2 (once per calendar year)  01/01/2023    Annual Wellness Visit  01/03/2023    Cholesterol Lab  01/03/2023    FALL RISK ASSESSMENT  01/03/2023

## 2023-03-11 ENCOUNTER — APPOINTMENT (OUTPATIENT)
Dept: ULTRASOUND IMAGING | Facility: CLINIC | Age: 78
End: 2023-03-11
Attending: EMERGENCY MEDICINE
Payer: COMMERCIAL

## 2023-03-11 ENCOUNTER — APPOINTMENT (OUTPATIENT)
Dept: MRI IMAGING | Facility: CLINIC | Age: 78
End: 2023-03-11
Attending: PHYSICIAN ASSISTANT
Payer: COMMERCIAL

## 2023-03-11 ENCOUNTER — HOSPITAL ENCOUNTER (OUTPATIENT)
Facility: CLINIC | Age: 78
Setting detail: OBSERVATION
Discharge: HOME | End: 2023-03-13
Attending: EMERGENCY MEDICINE | Admitting: HOSPITALIST
Payer: COMMERCIAL

## 2023-03-11 DIAGNOSIS — R11.2 INTRACTABLE NAUSEA AND VOMITING: ICD-10-CM

## 2023-03-11 DIAGNOSIS — R10.11 RUQ ABDOMINAL PAIN: ICD-10-CM

## 2023-03-11 DIAGNOSIS — K83.8 DILATION OF BILIARY TRACT: ICD-10-CM

## 2023-03-11 DIAGNOSIS — K80.20 CALCULUS OF GALLBLADDER WITHOUT CHOLECYSTITIS WITHOUT OBSTRUCTION: ICD-10-CM

## 2023-03-11 LAB
ALBUMIN SERPL BCG-MCNC: 4.3 G/DL (ref 3.5–5.2)
ALBUMIN UR-MCNC: NEGATIVE MG/DL
ALP SERPL-CCNC: 83 U/L (ref 35–104)
ALT SERPL W P-5'-P-CCNC: 13 U/L (ref 10–35)
ANION GAP SERPL CALCULATED.3IONS-SCNC: 13 MMOL/L (ref 7–15)
APPEARANCE UR: CLEAR
AST SERPL W P-5'-P-CCNC: 20 U/L (ref 10–35)
BASOPHILS # BLD AUTO: 0 10E3/UL (ref 0–0.2)
BASOPHILS NFR BLD AUTO: 0 %
BILIRUB SERPL-MCNC: 0.6 MG/DL
BILIRUB UR QL STRIP: NEGATIVE
BUN SERPL-MCNC: 15.1 MG/DL (ref 8–23)
CALCIUM SERPL-MCNC: 9.5 MG/DL (ref 8.8–10.2)
CHLORIDE SERPL-SCNC: 103 MMOL/L (ref 98–107)
COLOR UR AUTO: YELLOW
CREAT SERPL-MCNC: 0.87 MG/DL (ref 0.51–0.95)
DEPRECATED HCO3 PLAS-SCNC: 24 MMOL/L (ref 22–29)
EOSINOPHIL # BLD AUTO: 0 10E3/UL (ref 0–0.7)
EOSINOPHIL NFR BLD AUTO: 0 %
ERYTHROCYTE [DISTWIDTH] IN BLOOD BY AUTOMATED COUNT: 12.1 % (ref 10–15)
GFR SERPL CREATININE-BSD FRML MDRD: 68 ML/MIN/1.73M2
GLUCOSE SERPL-MCNC: 139 MG/DL (ref 70–99)
GLUCOSE UR STRIP-MCNC: NEGATIVE MG/DL
HCT VFR BLD AUTO: 41.6 % (ref 35–47)
HGB BLD-MCNC: 14.2 G/DL (ref 11.7–15.7)
HGB UR QL STRIP: NEGATIVE
HOLD SPECIMEN: NORMAL
IMM GRANULOCYTES # BLD: 0 10E3/UL
IMM GRANULOCYTES NFR BLD: 0 %
KETONES UR STRIP-MCNC: 20 MG/DL
LEUKOCYTE ESTERASE UR QL STRIP: NEGATIVE
LIPASE SERPL-CCNC: 25 U/L (ref 13–60)
LYMPHOCYTES # BLD AUTO: 1.6 10E3/UL (ref 0.8–5.3)
LYMPHOCYTES NFR BLD AUTO: 21 %
MCH RBC QN AUTO: 31.5 PG (ref 26.5–33)
MCHC RBC AUTO-ENTMCNC: 34.1 G/DL (ref 31.5–36.5)
MCV RBC AUTO: 92 FL (ref 78–100)
MONOCYTES # BLD AUTO: 0.5 10E3/UL (ref 0–1.3)
MONOCYTES NFR BLD AUTO: 6 %
MUCOUS THREADS #/AREA URNS LPF: PRESENT /LPF
NEUTROPHILS # BLD AUTO: 5.6 10E3/UL (ref 1.6–8.3)
NEUTROPHILS NFR BLD AUTO: 73 %
NITRATE UR QL: NEGATIVE
NRBC # BLD AUTO: 0 10E3/UL
NRBC BLD AUTO-RTO: 0 /100
PH UR STRIP: 5.5 [PH] (ref 5–7)
PLATELET # BLD AUTO: 210 10E3/UL (ref 150–450)
POTASSIUM SERPL-SCNC: 4.3 MMOL/L (ref 3.4–5.3)
PROT SERPL-MCNC: 7.1 G/DL (ref 6.4–8.3)
RBC # BLD AUTO: 4.51 10E6/UL (ref 3.8–5.2)
RBC URINE: <1 /HPF
SODIUM SERPL-SCNC: 140 MMOL/L (ref 136–145)
SP GR UR STRIP: 1.02 (ref 1–1.03)
SQUAMOUS EPITHELIAL: <1 /HPF
UROBILINOGEN UR STRIP-MCNC: NORMAL MG/DL
WBC # BLD AUTO: 7.8 10E3/UL (ref 4–11)
WBC URINE: 4 /HPF

## 2023-03-11 PROCEDURE — 255N000002 HC RX 255 OP 636: Performed by: EMERGENCY MEDICINE

## 2023-03-11 PROCEDURE — A9585 GADOBUTROL INJECTION: HCPCS | Performed by: EMERGENCY MEDICINE

## 2023-03-11 PROCEDURE — 76705 ECHO EXAM OF ABDOMEN: CPT

## 2023-03-11 PROCEDURE — G0378 HOSPITAL OBSERVATION PER HR: HCPCS

## 2023-03-11 PROCEDURE — 99222 1ST HOSP IP/OBS MODERATE 55: CPT | Mod: 57 | Performed by: STUDENT IN AN ORGANIZED HEALTH CARE EDUCATION/TRAINING PROGRAM

## 2023-03-11 PROCEDURE — 74183 MRI ABD W/O CNTR FLWD CNTR: CPT

## 2023-03-11 PROCEDURE — 250N000011 HC RX IP 250 OP 636: Performed by: EMERGENCY MEDICINE

## 2023-03-11 PROCEDURE — 96375 TX/PRO/DX INJ NEW DRUG ADDON: CPT

## 2023-03-11 PROCEDURE — 96374 THER/PROPH/DIAG INJ IV PUSH: CPT

## 2023-03-11 PROCEDURE — 81001 URINALYSIS AUTO W/SCOPE: CPT | Performed by: EMERGENCY MEDICINE

## 2023-03-11 PROCEDURE — 96376 TX/PRO/DX INJ SAME DRUG ADON: CPT | Mod: XU

## 2023-03-11 PROCEDURE — 250N000011 HC RX IP 250 OP 636: Performed by: PHYSICIAN ASSISTANT

## 2023-03-11 PROCEDURE — 36415 COLL VENOUS BLD VENIPUNCTURE: CPT | Performed by: EMERGENCY MEDICINE

## 2023-03-11 PROCEDURE — 96361 HYDRATE IV INFUSION ADD-ON: CPT

## 2023-03-11 PROCEDURE — 85025 COMPLETE CBC W/AUTO DIFF WBC: CPT | Performed by: EMERGENCY MEDICINE

## 2023-03-11 PROCEDURE — 99223 1ST HOSP IP/OBS HIGH 75: CPT | Performed by: PHYSICIAN ASSISTANT

## 2023-03-11 PROCEDURE — 258N000003 HC RX IP 258 OP 636: Performed by: EMERGENCY MEDICINE

## 2023-03-11 PROCEDURE — 250N000011 HC RX IP 250 OP 636: Performed by: STUDENT IN AN ORGANIZED HEALTH CARE EDUCATION/TRAINING PROGRAM

## 2023-03-11 PROCEDURE — 99285 EMERGENCY DEPT VISIT HI MDM: CPT | Mod: 25

## 2023-03-11 PROCEDURE — 258N000003 HC RX IP 258 OP 636: Performed by: PHYSICIAN ASSISTANT

## 2023-03-11 PROCEDURE — 83690 ASSAY OF LIPASE: CPT | Performed by: EMERGENCY MEDICINE

## 2023-03-11 PROCEDURE — 80053 COMPREHEN METABOLIC PANEL: CPT | Performed by: EMERGENCY MEDICINE

## 2023-03-11 RX ORDER — HYDROMORPHONE HCL IN WATER/PF 6 MG/30 ML
0.2 PATIENT CONTROLLED ANALGESIA SYRINGE INTRAVENOUS
Status: DISCONTINUED | OUTPATIENT
Start: 2023-03-11 | End: 2023-03-13 | Stop reason: HOSPADM

## 2023-03-11 RX ORDER — ONDANSETRON 2 MG/ML
4 INJECTION INTRAMUSCULAR; INTRAVENOUS EVERY 30 MIN PRN
Status: DISCONTINUED | OUTPATIENT
Start: 2023-03-11 | End: 2023-03-11

## 2023-03-11 RX ORDER — ACETAMINOPHEN 650 MG/1
650 SUPPOSITORY RECTAL EVERY 6 HOURS PRN
Status: DISCONTINUED | OUTPATIENT
Start: 2023-03-11 | End: 2023-03-13 | Stop reason: HOSPADM

## 2023-03-11 RX ORDER — HYDROMORPHONE HCL IN WATER/PF 6 MG/30 ML
0.4 PATIENT CONTROLLED ANALGESIA SYRINGE INTRAVENOUS
Status: DISCONTINUED | OUTPATIENT
Start: 2023-03-11 | End: 2023-03-13 | Stop reason: HOSPADM

## 2023-03-11 RX ORDER — ALBUTEROL SULFATE 90 UG/1
2 AEROSOL, METERED RESPIRATORY (INHALATION) EVERY 4 HOURS PRN
Status: DISCONTINUED | OUTPATIENT
Start: 2023-03-11 | End: 2023-03-13 | Stop reason: HOSPADM

## 2023-03-11 RX ORDER — OXYCODONE HYDROCHLORIDE 5 MG/1
5 TABLET ORAL EVERY 4 HOURS PRN
Status: DISCONTINUED | OUTPATIENT
Start: 2023-03-11 | End: 2023-03-13 | Stop reason: HOSPADM

## 2023-03-11 RX ORDER — SODIUM CHLORIDE, SODIUM LACTATE, POTASSIUM CHLORIDE, CALCIUM CHLORIDE 600; 310; 30; 20 MG/100ML; MG/100ML; MG/100ML; MG/100ML
INJECTION, SOLUTION INTRAVENOUS CONTINUOUS
Status: ACTIVE | OUTPATIENT
Start: 2023-03-11 | End: 2023-03-12

## 2023-03-11 RX ORDER — GADOBUTROL 604.72 MG/ML
6.5 INJECTION INTRAVENOUS ONCE
Status: COMPLETED | OUTPATIENT
Start: 2023-03-11 | End: 2023-03-11

## 2023-03-11 RX ORDER — SODIUM CHLORIDE, SODIUM LACTATE, POTASSIUM CHLORIDE, CALCIUM CHLORIDE 600; 310; 30; 20 MG/100ML; MG/100ML; MG/100ML; MG/100ML
INJECTION, SOLUTION INTRAVENOUS CONTINUOUS
Status: DISCONTINUED | OUTPATIENT
Start: 2023-03-11 | End: 2023-03-13 | Stop reason: HOSPADM

## 2023-03-11 RX ORDER — PROCHLORPERAZINE MALEATE 5 MG
5 TABLET ORAL EVERY 6 HOURS PRN
Status: DISCONTINUED | OUTPATIENT
Start: 2023-03-11 | End: 2023-03-13 | Stop reason: HOSPADM

## 2023-03-11 RX ORDER — KETOROLAC TROMETHAMINE 15 MG/ML
15 INJECTION, SOLUTION INTRAMUSCULAR; INTRAVENOUS EVERY 8 HOURS PRN
Status: DISCONTINUED | OUTPATIENT
Start: 2023-03-11 | End: 2023-03-13 | Stop reason: HOSPADM

## 2023-03-11 RX ORDER — METRONIDAZOLE 7.5 MG/G
GEL TOPICAL 2 TIMES DAILY
COMMUNITY

## 2023-03-11 RX ORDER — ONDANSETRON 4 MG/1
4 TABLET, ORALLY DISINTEGRATING ORAL EVERY 6 HOURS PRN
Status: DISCONTINUED | OUTPATIENT
Start: 2023-03-11 | End: 2023-03-13 | Stop reason: HOSPADM

## 2023-03-11 RX ORDER — ONDANSETRON 2 MG/ML
4 INJECTION INTRAMUSCULAR; INTRAVENOUS EVERY 6 HOURS PRN
Status: DISCONTINUED | OUTPATIENT
Start: 2023-03-11 | End: 2023-03-13 | Stop reason: HOSPADM

## 2023-03-11 RX ORDER — ACETAMINOPHEN 325 MG/1
650 TABLET ORAL EVERY 6 HOURS PRN
Status: DISCONTINUED | OUTPATIENT
Start: 2023-03-11 | End: 2023-03-13 | Stop reason: HOSPADM

## 2023-03-11 RX ORDER — SODIUM CHLORIDE 9 MG/ML
INJECTION, SOLUTION INTRAVENOUS CONTINUOUS
Status: DISCONTINUED | OUTPATIENT
Start: 2023-03-11 | End: 2023-03-11

## 2023-03-11 RX ORDER — HYDROMORPHONE HYDROCHLORIDE 1 MG/ML
0.5 INJECTION, SOLUTION INTRAMUSCULAR; INTRAVENOUS; SUBCUTANEOUS
Status: DISCONTINUED | OUTPATIENT
Start: 2023-03-11 | End: 2023-03-11

## 2023-03-11 RX ORDER — PROCHLORPERAZINE 25 MG
12.5 SUPPOSITORY, RECTAL RECTAL EVERY 12 HOURS PRN
Status: DISCONTINUED | OUTPATIENT
Start: 2023-03-11 | End: 2023-03-13 | Stop reason: HOSPADM

## 2023-03-11 RX ORDER — CEFTRIAXONE 1 G/1
1 INJECTION, POWDER, FOR SOLUTION INTRAMUSCULAR; INTRAVENOUS EVERY 24 HOURS
Status: DISCONTINUED | OUTPATIENT
Start: 2023-03-11 | End: 2023-03-12

## 2023-03-11 RX ORDER — METRONIDAZOLE 500 MG/100ML
500 INJECTION, SOLUTION INTRAVENOUS EVERY 12 HOURS
Status: DISCONTINUED | OUTPATIENT
Start: 2023-03-11 | End: 2023-03-12

## 2023-03-11 RX ORDER — CITALOPRAM HYDROBROMIDE 20 MG/1
20 TABLET ORAL DAILY
Status: DISCONTINUED | OUTPATIENT
Start: 2023-03-11 | End: 2023-03-13 | Stop reason: HOSPADM

## 2023-03-11 RX ORDER — ASPIRIN 81 MG/1
81 TABLET ORAL EVERY MORNING
COMMUNITY

## 2023-03-11 RX ORDER — CARVEDILOL 6.25 MG/1
6.25 TABLET ORAL 2 TIMES DAILY WITH MEALS
Status: DISCONTINUED | OUTPATIENT
Start: 2023-03-11 | End: 2023-03-13 | Stop reason: HOSPADM

## 2023-03-11 RX ADMIN — ONDANSETRON 4 MG: 4 TABLET, ORALLY DISINTEGRATING ORAL at 16:46

## 2023-03-11 RX ADMIN — METRONIDAZOLE 500 MG: 500 INJECTION, SOLUTION INTRAVENOUS at 17:34

## 2023-03-11 RX ADMIN — ONDANSETRON 4 MG: 2 INJECTION INTRAMUSCULAR; INTRAVENOUS at 09:08

## 2023-03-11 RX ADMIN — ONDANSETRON 4 MG: 2 INJECTION INTRAMUSCULAR; INTRAVENOUS at 12:49

## 2023-03-11 RX ADMIN — HYDROMORPHONE HYDROCHLORIDE 0.2 MG: 0.2 INJECTION, SOLUTION INTRAMUSCULAR; INTRAVENOUS; SUBCUTANEOUS at 20:44

## 2023-03-11 RX ADMIN — SODIUM CHLORIDE, POTASSIUM CHLORIDE, SODIUM LACTATE AND CALCIUM CHLORIDE: 600; 310; 30; 20 INJECTION, SOLUTION INTRAVENOUS at 16:44

## 2023-03-11 RX ADMIN — HYDROMORPHONE HYDROCHLORIDE 0.5 MG: 1 INJECTION, SOLUTION INTRAMUSCULAR; INTRAVENOUS; SUBCUTANEOUS at 09:07

## 2023-03-11 RX ADMIN — PROCHLORPERAZINE EDISYLATE 5 MG: 5 INJECTION INTRAMUSCULAR; INTRAVENOUS at 18:29

## 2023-03-11 RX ADMIN — CEFTRIAXONE 1 G: 1 INJECTION, POWDER, FOR SOLUTION INTRAMUSCULAR; INTRAVENOUS at 18:32

## 2023-03-11 RX ADMIN — GADOBUTROL 6.5 ML: 604.72 INJECTION INTRAVENOUS at 15:22

## 2023-03-11 RX ADMIN — SODIUM CHLORIDE 1000 ML: 9 INJECTION, SOLUTION INTRAVENOUS at 09:07

## 2023-03-11 ASSESSMENT — ENCOUNTER SYMPTOMS
VOMITING: 1
DYSURIA: 0
COUGH: 1
SHORTNESS OF BREATH: 0
CONSTIPATION: 0
BLOOD IN STOOL: 0
FREQUENCY: 0
DIARRHEA: 0
FEVER: 0
ABDOMINAL PAIN: 1

## 2023-03-11 ASSESSMENT — ACTIVITIES OF DAILY LIVING (ADL)
ADLS_ACUITY_SCORE: 35
ADLS_ACUITY_SCORE: 22
ADLS_ACUITY_SCORE: 22
ADLS_ACUITY_SCORE: 35
ADLS_ACUITY_SCORE: 35
ADLS_ACUITY_SCORE: 20
ADLS_ACUITY_SCORE: 35
ADLS_ACUITY_SCORE: 22

## 2023-03-11 NOTE — H&P
[facility]  History and Physical - Hospitalist Service  Date of Admission:  3/11/2023    Assessment & Plan Kandis Tse is a 77 year old female with PMHx significant for hypertension, prior CVA,  nonobstructive CAD, prior stress cardiomyopathy, breast cancer status post lumpectomy, osteoporosis, gastritis, MDD, anxiety, who was admitted on 3/11/2023 with abdominal pain concerning for biliary colic.     Biliary Colic  Cholelithiasis  Developed acute onset RUQ pain, nausea, non bloody emesis PM of 3/10. No fever, leukocytosis or hepatitis. CMP reassuring. RUQ US Marked extrahepatic biliary dilatation of uncertain etiology, stone does appear lodged in the neck of the gallbladder.  ED consulted with general surgery who recommended admission with MRCP.   She reports prior episodes of similar symptoms although less severe.   - NPO, iv fluids while NPO  - MRCP  - follow CMP  - Gen Surgery consult  - analgesia, antiemetics PRN   - start antibiotics if febrile, clinical worsening. Hold off for now with lack of findings currently concerning for cholecystis     History of Stress Cardiomyopathy 2015, since resolved  Non obstructive CAD  No anginal symptoms, change in exercise tolerance, dyspnea etc.  Had been previously followed by [place] cardiology for Takotsubo cardiomyopathy.  Per chart review at [facility] with minimal CAD.  - hold PTA asa, continue Coreg  - no further cardiac clearance needed    MDD/Anxiety  - continue PTA selective serotonin reuptake inhibitor    Gastritis  If this upper endoscopy about 1 year ago.  - PPI  - avoid po nsaids as able    CVA  Reported 10+ y/o ago without deficits.        Diet: NPO for Medical/Clinical Reasons Except for: Meds, Ice Chips      DVT Prophylaxis: Low Risk/Ambulatory with no VTE prophylaxis indicated  Whiteside Catheter: Not present    Cardiac Monitoring: None    Code Status:   Full Code     Clinically Significant Risk Factors Present on Admission         "            # Acute Respiratory Failure: Documented O2 saturation < 91%.  Continue supplemental oxygen as needed             Disposition Plan      Expected Discharge Date: 03/12/2023                The patient's care was discussed with the ED Team.    Ksenia Killian PA-C  Hospitalist Service  [facility]  Securely message with [employer] (more info)  Text page via [facility] Paging/Directory     ______________________________________________________________________    Chief Complaint   Abdominal pain    History is obtained from the patient    History of Present Illness   Kandis Tse is a 77 year old female who presented to the ED for evaluation of abdominal pain.   Ms. Tse developed right-sided abdominal pain described as \"twinging \"this developed last night after an evening meal.  She had pain throughout the night preventing her from sleep.  She did not try any medications for the pain.  She had 5 episodes of emesis 1 that was described as \"bright yellow \".  Associated nausea and dry heaves.  Still having pain after medications in the ED but is improving.  Has nausea.  No fever, diarrhea.  She reports history of similar episodes of abdominal pain although less severe.  She last had surgery 1 year ago after hip fracture.  She reports history of coronary artery disease and had a's MI about 7 years ago.  She has not had any chest pain or shortness of breath with activities such as weightlifting exercises or climbing the stairs.  She takes a baby aspirin daily is not on anticoagulation.       By EMS given 6 mg morphine, 4 mg zofran.  In the ED afebrile, mildly hypertensive. CBC, CMP unremarkable. RUQ US Marked extrahepatic biliary dilatation of uncertain etiology stone does appear lodged in the neck of the gallbladder.  ED consulted with general surgery who recommended admission with MRCP.      Admission was requested from hospitalist service for biliary colic.     Past Medical History  "   Past Medical History:   Diagnosis Date     Anxiety      Breast cancer (H)      Hx of radiation therapy      MI, old      Osteoarthritis     hips     Osteopenia      Peripheral vertigo      PONV (postoperative nausea and vomiting)      Stress-induced cardiomyopathy      T12 compression fracture (H)        Past Surgical History   Past Surgical History:   Procedure Laterality Date     ARTHROPLASTY HIP Left 3/16/2021    Procedure: Left total hip arthroplasty (Peterson and Nephew 52 mm acetabulum; #10 A-Fit Anthology high offset stem; -2 neck 36 mm head);  Surgeon: Dante Becker MD;  Location: [facility] OR     BIOPSY BREAST Left Early 1990's     BREAST SURGERY      breast biopsy, breast lumpectomy     COSMETIC SURGERY      augmentation mammaplasty     GYN SURGERY      hysterectomy     HYSTERECTOMY  Early 1990's     IR THORACIC VERTEBROPLASTY  10/11/2016     LUMPECTOMY BREAST Left Early 1990's     MAMMOPLASTY AUGMENTATION Bilateral Late 1990's     OPEN REDUCTION INTERNAL FIXATION HIP NAILING Right 2/14/2022    Procedure: RIGHT INTERTROCHANTERIC FEMUR NAILING;  Surgeon: Karthik Carcamo MD;  Location: [facility] OR     ORTHOPEDIC SURGERY Bilateral     bunionectomy/10 toes repaired     OTHER SURGICAL HISTORY      lumpectomy     REPAIR HAMMER TOE Bilateral 4/18/2018    Procedure: REPAIR HAMMER TOE;  RIGHT SECOND, FOURTH, AND FIFTH  MALLET TOE RECONSTRUCTION WITH LEFT THIRD CLAWTOE RECONSTRUCTION (CHOICE)  ;  Surgeon: Edgar Carlisle MD;  Location: [facility] OR     WRIST SURGERY Left      Z TOTAL ABDOM HYSTERECTOMY      Description: Hysterectomy;  Recorded: 06/17/2013;       Prior to Admission Medications   Prior to Admission Medications   Prescriptions Last Dose Informant Patient Reported? Taking?   Multiple Vitamin (ONE DAILY MULTIVITAMIN ADULT PO) 3/9/2023 at am  Yes Yes   Sig: Take 1 tablet by mouth daily   albuterol (PROAIR HFA/PROVENTIL HFA/VENTOLIN HFA) 108 (90 Base) MCG/ACT inhaler Past Week at Couple days ago  No Yes    Sig: Inhale 2 puffs into the lungs as needed for shortness of breath or wheezing   alendronate (FOSAMAX) 70 MG tablet Not Taking at not started yet  No No   Sig: Take 1 tablet (70 mg) by mouth every 7 days   Patient not taking: Reported on 3/11/2023   aspirin 81 MG EC tablet 3/9/2023 at am  Yes Yes   Sig: Take 81 mg by mouth every morning   carvedilol (COREG) 6.25 MG tablet 3/9/2023 at am  No Yes   Sig: Take 1 tablet (6.25 mg) by mouth 2 times daily (with meals)   citalopram (CELEXA) 20 MG tablet 3/9/2023 at am  No Yes   Sig: Take 1 tablet (20 mg) by mouth daily   metroNIDAZOLE (METROGEL) 0.75 % external gel Unknown at unknown  Yes Yes   Sig: Apply topically 2 times daily Apply to face   oxybutynin ER (DITROPAN XL) 5 MG 24 hr tablet 3/9/2023 at am  No Yes   Sig: Take 1 tablet (5 mg) by mouth daily   spironolactone (ALDACTONE) 100 MG tablet 3/9/2023 at am  Yes Yes   Sig: Take 1 tablet by mouth daily      Facility-Administered Medications: None        Review of Systems    The 10 point Review of Systems is negative other than noted in the HPI or here.     Social History   I have reviewed this patient's social history and updated it with pertinent information if needed.  Social History     Tobacco Use     Smoking status: Former     Smokeless tobacco: Never   Vaping Use     Vaping Use: Never used   Substance Use Topics     Alcohol use: No     Drug use: No        Physical Exam   Vital Signs: Temp: 97.6  F (36.4  C) Temp src: Oral BP: (!) 140/98 Pulse: 81   Resp: 30 SpO2: 99 % O2 Device: Nasal cannula Weight: 0 lbs 0 oz    Constitutional: Awake, alert,  no apparent distress.  Eyes: Conjunctiva and pupils examined and normal.  HEENT: Moist mucous membranes, normal dentition.  Respiratory: Clear to auscultation bilaterally, no crackles or wheezing.  Cardiovascular: Regular rate and rhythm, normal S1 and S2, and no murmur noted.  GI: Soft, non-distended, RUQ tenderness, bowel sounds present. No rebound tenderness or  guarding.  Lymph/Hematologic: No anterior cervical or supraclavicular adenopathy.  Skin: No rashes, no cyanosis, no edema.  Musculoskeletal: No deformities noted.  No erythema or tenderness. Moving all extremities.  Neurologic: No focal deficits noted. Speech is clear. Coordination and strength grossly normal.   Psychiatric: Appropriate affect.       Medical Decision Making       75 MINUTES SPENT BY ME on the date of service doing chart review, history, exam, documentation & further activities per the note.      Data   ------------------------- PAST 24 HR DATA REVIEWED -----------------------------------------------

## 2023-03-11 NOTE — CONSULTS
General Surgery Consultation    Kandis Tse MRN# [id number]   Age: 77 year old YOB: 1945     Date of Admission:  3/11/2023    Reason for consult:            Abdominal pain, right upper quadrant       Requesting physician:            Dr Killian                Assessment and Plan:   Assessment:   Kandis Tse is a 77 year old female with signs and symptoms concerning for symptomatic cholelithiasis. However, also noted to have a markedly enlarged extra-hepatic bile duct on US. LFTs are wnl. No signs of acute cholecystitis on imaging and wbc wnl. The differential remains broad for this dilation and includes Mirizzi syndrome, choledocholithiasis, stricture, extra-hepatic mass or bile duct/ampullary mass.     Comorbidities:   has a past medical history of Anxiety, Breast cancer (H), radiation therapy, MI, old, Osteoarthritis, Osteopenia, Peripheral vertigo, PONV (postoperative nausea and vomiting), Stress-induced cardiomyopathy, and T12 compression fracture (H).      Plan:   -recommend MRCP to better evaluate dilated CBD   -if no abnormalities on MRCP then plan for lap orion later today or more likely tomorrow   -no signs of acute cholecystitis on labs or imaging, can hold off on Abx for now              Chief Complaint:   Abdominal pain, right upper quadrant     History is obtained from the patient.         History of Present Illness:   Kandis Tse is a 77 year old female with a history of breast cancer and NSTEMI who presents to the hospital this morning with acute onset of epigastric and right upper quadrant abdominal pain. The pain started last night but got worse throughout the night and was severe this morning. She had several episodes of vomiting which was yellow in color and not bloody. Her pain is better now but she did receive pain medications. She denies fevers. She has never had this type of pain before and did not know she had gallstones until now. She has a history of a  NSTEMI back in 2015 but is only on carvedilol and asa for this now. She has a abdominal surgical history of a hysterectomy.           Past Medical History:     Past Medical History:   Diagnosis Date     Anxiety      Breast cancer (H)      Hx of radiation therapy      MI, old      Osteoarthritis     hips     Osteopenia      Peripheral vertigo      PONV (postoperative nausea and vomiting)      Stress-induced cardiomyopathy      T12 compression fracture (H)              Past Surgical History:     Past Surgical History:   Procedure Laterality Date     ARTHROPLASTY HIP Left 3/16/2021    Procedure: Left total hip arthroplasty (P[employer]on [employer] 52 mm acetabulum; #10 [employer] high offset stem; -2 neck 36 mm head);  Surgeon: Dante Becker MD;  Location: [facility] OR     BIOPSY BREAST Left Early 1990's     BREAST SURGERY      breast biopsy, breast lumpectomy     COSMETIC SURGERY      augmentation mammaplasty     GYN SURGERY      hysterectomy     HYSTERECTOMY  Early 1990's     IR THORACIC VERTEBROPLASTY  10/11/2016     LUMPECTOMY BREAST Left Early 1990's     MAMMOPLASTY AUGMENTATION Bilateral Late 1990's     OPEN REDUCTION INTERNAL FIXATION HIP NAILING Right 2/14/2022    Procedure: RIGHT INTERTROCHANTERIC FEMUR NAILING;  Surgeon: Karthik Carcamo MD;  Location:  OR     ORTHOPEDIC SURGERY Bilateral     bunionectomy/10 toes repaired     OTHER SURGICAL HISTORY      lumpectomy     REPAIR HAMMER TOE Bilateral 4/18/2018    Procedure: REPAIR HAMMER TOE;  RIGHT SECOND, FOURTH, AND FIFTH  MALLET TOE RECONSTRUCTION WITH LEFT THIRD CLAWTOE RECONSTRUCTION (CHOICE)  ;  Surgeon: Edgar Carlisle MD;  Location:  OR     WRIST SURGERY Left      ZZC TOTAL ABDOM HYSTERECTOMY      Description: Hysterectomy;  Recorded: 06/17/2013;             Social History:     Social History     Tobacco Use     Smoking status: Former     Smokeless tobacco: Never   Substance Use Topics     Alcohol use: No             Family History:     Family  History   Problem Relation Age of Onset     Breast Cancer Mother         Post Menopausal     Dementia Father             Allergies:   No Known Allergies          Medications:   No current facility-administered medications on file prior to encounter.  albuterol (PROAIR HFA/PROVENTIL HFA/VENTOLIN HFA) 108 (90 Base) MCG/ACT inhaler, Inhale 2 puffs into the lungs as needed for shortness of breath or wheezing  alendronate (FOSAMAX) 70 MG tablet, Take 1 tablet (70 mg) by mouth every 7 days  aspirin (ASA) 81 MG chewable tablet, Take 1 tablet (81 mg) by mouth daily  carvedilol (COREG) 6.25 MG tablet, Take 1 tablet (6.25 mg) by mouth 2 times daily (with meals)  citalopram (CELEXA) 20 MG tablet, Take 1 tablet (20 mg) by mouth daily  oxybutynin ER (DITROPAN XL) 5 MG 24 hr tablet, Take 1 tablet (5 mg) by mouth daily  spironolactone (ALDACTONE) 100 MG tablet, Take 1 tablet by mouth daily at 2 pm               Review of Systems:   The 10 point review of systems is negative other than noted in the HPI.          Physical Exam:   BP (!) 140/98   Pulse 81   Temp 97.6  F (36.4  C) (Oral)   Resp 30   LMP  (LMP Unknown)   SpO2 99%   General - Well developed, well nourished female in no apparent distress but does appear tired   Lungs: normal effort on RA   Heart: regular rate  Abdomen: soft, not distended, not tender in the RUQ at this time but did recently receive pain medications, negative spann sign, otherwise abdomen not tender   Neurologic: nonfocal  Psychiatric: Mood and affect appropriate  Skin: Without lesions, rashes, or jaundice         Data:     WBC -   Lab Results   Component Value Date    WBC 7.8 03/11/2023       HgB -   Lab Results   Component Value Date    HGB 14.2 03/11/2023       Plt-   Lab Results   Component Value Date     03/11/2023       Liver Function Studies -   Recent Labs   Lab Test 03/11/23  0810   PROTTOTAL 7.1   ALBUMIN 4.3   BILITOTAL 0.6   ALKPHOS 83   AST 20   ALT 13       Lipase-   Lab Results    Component Value Date    LIPASE 25 03/11/2023         Imaging:  All imaging studies reviewed by me.    Results for orders placed or performed during the hospital encounter of 03/11/23   US Abdomen Limited (RUQ)    Narrative    EXAM: ULTRASOUND ABDOMEN LIMITED  LOCATION: [facility]  DATE/TIME: 03/11/2023, 10:04 AM    INDICATION: Right upper quadrant pain, vomiting,  COMPARISON: None.  TECHNIQUE: Limited abdominal ultrasound.    FINDINGS:    GALLBLADDER: Cholelithiasis without cholecystitis. 1.9 cm stone appears lodged in the neck of the gallbladder. No gallbladder wall thickening.    BILE DUCTS: Marked extrahepatic biliary dilatation. The common duct measures 16 mm. No definite choledocholithiasis.    LIVER: Normal parenchyma with smooth contour. No focal mass.    RIGHT KIDNEY: No hydronephrosis.    PANCREAS: The visualized portions are normal.    No ascites.      Impression    IMPRESSION:  1.  Marked extrahepatic biliary dilatation of uncertain etiology. If the patient can adequately breath-hold, consider MRCP for further evaluation.  2.  Cholelithiasis without cholecystitis. A stone does appear lodged in the neck of the gallbladder.           Time spent with the patient, reviewing the EMR, reviewing laboratory and imaging studies, more than 50% of which was counseling and coordinating care:  45 minutes.     Clay Cohen MD

## 2023-03-11 NOTE — ED NOTES
[facility]  ED Nurse Handoff Report    Kandis Tse is a 77 year old female   ED Chief complaint: Abdominal Pain  . ED Diagnosis:   Final diagnoses:   None     Allergies: No Known Allergies    Code Status: Full Code  Activity level - Baseline/Home:  Independent.   Activity Level - Current:   Assist X 1.   Lift room needed: No.   Bariatric: No   Needed: No   Isolation: No.   Infection: Not Applicable.     Vital Signs:   Vitals:    03/11/23 1011 03/11/23 1013 03/11/23 1014 03/11/23 1045   BP:    (!) 140/98   Pulse:    81   Resp:    30   Temp:       TempSrc:       SpO2: (!) 88% 100% 99% 99%       Cardiac Rhythm:  ,   Cardiac  Cardiac Rhythm: Other (Comment) (SR with PVCs)  Pain level:    Patient confused: No.   Patient Falls Risk: Yes.   Elimination Status: Has voided   Patient Report - Initial Complaint: ABD pain.   Focused Assessment: Kandis Tse is a 77 year old female with a history of breast cancer, MI, and cardiomyopathy who presents with abdominal pain. The patient states that 10 hours ago while laying in bed she started having right sided upper abdominal pain under the ribs that radiates into her back. She vomited a few times and it was a bright yellow. The pain continued through the morning and thought that she needed to come in. She does have a cough that is normal for her. She denies fever, dysuria, frequency, shortness of breath, constipation, blood in the stool, or diarrhea. She has not taken any medication for the symptoms. She had a physical 4 days ago which was normal. The only surgery she had had on her abdomen is a hysterectomy. She is on a baby aspirin but no thinners, She had an ulcer a year ago. She has never had gallstones.   Tests Performed:   Labs Ordered and Resulted from Time of ED Arrival to Time of ED Departure   COMPREHENSIVE METABOLIC PANEL - Abnormal       Result Value    Sodium 140      Potassium 4.3      Chloride 103      Carbon Dioxide (CO2) 24       Anion Gap 13      Urea Nitrogen 15.1      Creatinine 0.87      Calcium 9.5      Glucose 139 (*)     Alkaline Phosphatase 83      AST 20      ALT 13      Protein Total 7.1      Albumin 4.3      Bilirubin Total 0.6      GFR Estimate 68     LIPASE - Normal    Lipase 25     CBC WITH PLATELETS AND DIFFERENTIAL    WBC Count 7.8      RBC Count 4.51      Hemoglobin 14.2      Hematocrit 41.6      MCV 92      MCH 31.5      MCHC 34.1      RDW 12.1      Platelet Count 210      % Neutrophils 73      % Lymphocytes 21      % Monocytes 6      % Eosinophils 0      % Basophils 0      % Immature Granulocytes 0      NRBCs per 100 WBC 0      Absolute Neutrophils 5.6      Absolute Lymphocytes 1.6      Absolute Monocytes 0.5      Absolute Eosinophils 0.0      Absolute Basophils 0.0      Absolute Immature Granulocytes 0.0      Absolute NRBCs 0.0     ROUTINE UA WITH MICROSCOPIC REFLEX TO CULTURE     US Abdomen Limited (RUQ)   Final Result   IMPRESSION:   1.  Marked extrahepatic biliary dilatation of uncertain etiology. If the patient can adequately breath-hold, consider MRCP for further evaluation.   2.  Cholelithiasis without cholecystitis. A stone does appear lodged in the neck of the gallbladder.           Treatments provided: See Epic  Family Comments: NA  OBS brochure/video discussed/provided to patient:  No  ED Medications:   Medications   ondansetron (ZOFRAN) injection 4 mg (4 mg Intravenous $Given 3/11/23 0908)   HYDROmorphone (PF) (DILAUDID) injection 0.5 mg (0.5 mg Intravenous $Given 3/11/23 0907)   0.9% sodium chloride BOLUS (1,000 mLs Intravenous $New Bag 3/11/23 0907)     Followed by   sodium chloride 0.9% infusion (has no administration in time range)     Drips infusing:  No  For the majority of the shift, the patient's behavior Green.   Interventions performed were NA.    Sepsis treatment initiated: No     Patient tested for COVID 19 prior to admission: NO    ED Nurse Name/Phone Number: Nava Lane RN,   10:58  AM    RECEIVING UNIT ED HANDOFF REVIEW    Above ED Nurse Handoff Report was reviewed: Yes  Reviewed by: Anju Page RN on March 11, 2023 at 2:47 PM

## 2023-03-11 NOTE — PHARMACY-ADMISSION MEDICATION HISTORY
Admission medication history interview status for this patient is complete. See [facility] admission navigator for allergy information, prior to admission medications and immunization status.     Medication history interview done, indicate source(s): Patient  Medication history resources (including written lists, pill bottles, clinic record):SureScripts and Care Everywhere  Pharmacy: [id number] - [place], [place] - [address]     Changes made to PTA medication list:  Added: Multivitamin, Metronidazole gel  Changed: aspirin chew tab --> EC tablet  Reported as Not Taking: Alendronate  Removed: None    Actions taken by pharmacist (provider contacted, etc): Spoke with patient to verify medication list.     Additional medication history information: Patient has not started alendronate yet.    Medication reconciliation/reorder completed by provider prior to medication history?  N   (Y/N)     Medication Affordability:  Not including over the counter (OTC) medications, was there a time in the past 12 months when you did not take your medications as prescribed because of cost?: No     For patients on insulin therapy: N      Prior to Admission medications    Medication Sig Last Dose Taking? Auth Provider Long Term End Date   albuterol (PROAIR HFA/PROVENTIL HFA/VENTOLIN HFA) 108 (90 Base) MCG/ACT inhaler Inhale 2 puffs into the lungs as needed for shortness of breath or wheezing Past Week at Couple days ago Yes Yoko Conde MD Yes    aspirin 81 MG EC tablet Take 81 mg by mouth every morning 3/9/2023 at am Yes Unknown, Entered By History     carvedilol (COREG) 6.25 MG tablet Take 1 tablet (6.25 mg) by mouth 2 times daily (with meals) 3/9/2023 at am Yes Yoko Conde MD Yes    citalopram (CELEXA) 20 MG tablet Take 1 tablet (20 mg) by mouth daily 3/9/2023 at am Yes Yoko Conde MD Yes    metroNIDAZOLE (METROGEL) 0.75 % external gel Apply topically 2 times daily to face  at unknown Yes Unknown, Entered By History      Multiple Vitamin (ONE DAILY MULTIVITAMIN ADULT PO) Take 1 tablet by mouth daily 3/9/2023 at am Yes Unknown, Entered By History     oxybutynin ER (DITROPAN XL) 5 MG 24 hr tablet Take 1 tablet (5 mg) by mouth daily 3/9/2023 at am Yes Yoko Conde MD     spironolactone (ALDACTONE) 100 MG tablet Take 1 tablet by mouth daily 3/9/2023 at am Yes Reported, Patient Yes    alendronate (FOSAMAX) 70 MG tablet Take 1 tablet (70 mg) by mouth every 7 days  Patient not taking: Reported on 3/11/2023 Not Taking at not started yet  Yoko Conde MD Yes

## 2023-03-11 NOTE — ED NOTES
Pt placed on 2L oxygen via nasal cannula d/t oxygen saturations dropping to as low as 80% with a good waveform. Pulse ox waveform suggested possible PVCs, pt reports cardiac history. Continuous cardiac monitoring applied, pt denies chest pain, jaw pain, or SOB. Infrequent PVCs noted. MD notified.

## 2023-03-11 NOTE — ED PROVIDER NOTES
History     Chief Complaint:  Abdominal Pain       HPI   Kandis Tse is a 77 year old female with a history of breast cancer, MI, and cardiomyopathy who presents with abdominal pain. The patient states that 10 hours ago while laying in bed she started having right sided upper abdominal pain under the ribs that radiates into her back. She vomited a few times and it was a bright yellow. The pain continued through the morning and thought that she needed to come in. She does have a cough that is normal for her. She denies fever, dysuria, frequency, shortness of breath, constipation, blood in the stool, or diarrhea. She has not taken any medication for the symptoms. She had a physical 4 days ago which was normal. The only surgery she had had on her abdomen is a hysterectomy. She is on a baby aspirin but no thinners, She had an ulcer a year ago. She has never had gallstones.      Independent Historian:   None - Patient Only    Review of External Notes:    None    ROS:  Review of Systems   Constitutional: Negative for fever.   Respiratory: Positive for cough. Negative for shortness of breath.    Gastrointestinal: Positive for abdominal pain and vomiting. Negative for blood in stool, constipation and diarrhea.   Genitourinary: Negative for dysuria and frequency.   All other systems reviewed and are negative.      Allergies:  No Known Allergies     Medications:    albuterol  alendronate   Aspirin 81mg  carvedilol   citalopram   oxybutynin ER   spironolactone    Past Medical History:    Breast cancer  MI  Osteoarthritis  Osteopenia  Peripheral vertigo  PONV  Stress induced cardiomyopathy    Past Surgical History:    Left hip arthroplasty  Breast lumpectomy  Augmentation mammaplasty  Hysterectomy  Thoracic vertebroplasty  Bunionectomy  Repair hammer toe  Wrist surgery     Family History:    Father-Dementia  Mother-Breast cancer    Social History:  The patient presents to the ED alone via EMS.    Physical Exam      Patient Vitals for the past 24 hrs:   BP Temp Temp src Pulse Resp SpO2   03/11/23 1638 124/74 97.7  F (36.5  C) Oral 83 16 100 %   03/11/23 1045 (!) 140/98 -- -- 81 30 99 %   03/11/23 1014 -- -- -- -- -- 99 %   03/11/23 1013 -- -- -- -- -- 100 %   03/11/23 1011 -- -- -- -- -- (!) 88 %   03/11/23 0957 -- -- -- -- -- (!) 88 %   03/11/23 0815 (!) 141/78 -- -- 82 -- 99 %   03/11/23 0807 -- -- -- 82 -- --   03/11/23 0805 (!) 154/94 97.6  F (36.4  C) Oral -- 18 100 %        Physical Exam  Vitals and nursing note reviewed.   Constitutional:       General: She is not in acute distress.     Appearance: She is not ill-appearing.   HENT:      Head: Normocephalic and atraumatic.      Right Ear: External ear normal.      Left Ear: External ear normal.      Mouth/Throat:      Mouth: Mucous membranes are dry.   Eyes:      Extraocular Movements: Extraocular movements intact.      Conjunctiva/sclera: Conjunctivae normal.   Cardiovascular:      Rate and Rhythm: Normal rate and regular rhythm.      Heart sounds: No murmur heard.  Pulmonary:      Effort: Pulmonary effort is normal. No respiratory distress.      Breath sounds: Normal breath sounds. No wheezing, rhonchi or rales.   Abdominal:      General: Abdomen is flat. Bowel sounds are normal. There is no distension.      Palpations: Abdomen is soft.      Tenderness: There is abdominal tenderness in the right upper quadrant. There is no guarding or rebound. Negative signs include Modi's sign.   Musculoskeletal:         General: No deformity or signs of injury.      Cervical back: Normal range of motion and neck supple.   Skin:     General: Skin is warm and dry.      Findings: No rash.   Neurological:      Mental Status: She is alert and oriented to person, place, and time.   Psychiatric:         Behavior: Behavior normal.           Emergency Department Course     Imaging:  US Abdomen Limited (RUQ)   Final Result   IMPRESSION:   1.  Marked extrahepatic biliary dilatation of  uncertain etiology. If the patient can adequately breath-hold, consider MRCP for further evaluation.   2.  Cholelithiasis without cholecystitis. A stone does appear lodged in the neck of the gallbladder.         MR Abdomen MRCP w/o & w Contrast    (Results Pending)      Report per radiology    Laboratory:  Labs Ordered and Resulted from Time of ED Arrival to Time of ED Departure   COMPREHENSIVE METABOLIC PANEL - Abnormal       Result Value    Sodium 140      Potassium 4.3      Chloride 103      Carbon Dioxide (CO2) 24      Anion Gap 13      Urea Nitrogen 15.1      Creatinine 0.87      Calcium 9.5      Glucose 139 (*)     Alkaline Phosphatase 83      AST 20      ALT 13      Protein Total 7.1      Albumin 4.3      Bilirubin Total 0.6      GFR Estimate 68     ROUTINE UA WITH MICROSCOPIC REFLEX TO CULTURE - Abnormal    Color Urine Yellow      Appearance Urine Clear      Glucose Urine Negative      Bilirubin Urine Negative      Ketones Urine 20 (*)     Specific Gravity Urine 1.021      Blood Urine Negative      pH Urine 5.5      Protein Albumin Urine Negative      Urobilinogen Urine Normal      Nitrite Urine Negative      Leukocyte Esterase Urine Negative      Mucus Urine Present (*)     RBC Urine <1      WBC Urine 4      Squamous Epithelials Urine <1     LIPASE - Normal    Lipase 25     CBC WITH PLATELETS AND DIFFERENTIAL    WBC Count 7.8      RBC Count 4.51      Hemoglobin 14.2      Hematocrit 41.6      MCV 92      MCH 31.5      MCHC 34.1      RDW 12.1      Platelet Count 210      % Neutrophils 73      % Lymphocytes 21      % Monocytes 6      % Eosinophils 0      % Basophils 0      % Immature Granulocytes 0      NRBCs per 100 WBC 0      Absolute Neutrophils 5.6      Absolute Lymphocytes 1.6      Absolute Monocytes 0.5      Absolute Eosinophils 0.0      Absolute Basophils 0.0      Absolute Immature Granulocytes 0.0      Absolute NRBCs 0.0        Emergency Department Course & Assessments:    Interventions:  Medications    albuterol (PROVENTIL HFA/VENTOLIN HFA) inhaler (has no administration in time range)   carvedilol (COREG) tablet 6.25 mg (has no administration in time range)   citalopram (celeXA) tablet 20 mg (20 mg Oral Not Given 3/11/23 1643)   melatonin tablet 1 mg (has no administration in time range)   ondansetron (ZOFRAN ODT) ODT tab 4 mg (has no administration in time range)     Or   ondansetron (ZOFRAN) injection 4 mg (has no administration in time range)   lactated ringers infusion ( Intravenous $New Bag 3/11/23 1644)   acetaminophen (TYLENOL) tablet 650 mg (has no administration in time range)     Or   acetaminophen (TYLENOL) Suppository 650 mg (has no administration in time range)   oxyCODONE IR (ROXICODONE) half-tab 2.5 mg (has no administration in time range)   oxyCODONE (ROXICODONE) tablet 5 mg (has no administration in time range)   HYDROmorphone (DILAUDID) injection 0.2 mg (has no administration in time range)   HYDROmorphone (DILAUDID) injection 0.4 mg (has no administration in time range)   prochlorperazine (COMPAZINE) injection 5 mg (has no administration in time range)     Or   prochlorperazine (COMPAZINE) tablet 5 mg (has no administration in time range)     Or   prochlorperazine (COMPAZINE) suppository 12.5 mg (has no administration in time range)   ketorolac (TORADOL) injection 15 mg (has no administration in time range)   lactated ringers infusion ( Intravenous Canceled Entry 3/11/23 1644)   0.9% sodium chloride BOLUS ( Intravenous Canceled Entry 3/11/23 1607)   gadobutrol (GADAVIST) injection 6.5 mL (6.5 mLs Intravenous $Given 3/11/23 1522)   sodium chloride (PF) 0.9% PF flush 60 mL (100 mLs Intravenous $Given 3/11/23 1521)      Assessments:  0830 Obtained the patients history and performed initial exam    Consultations/Discussion of Management or Tests:  ED Course as of 03/11/23 1645   Sat Mar 11, 2023   1128 Talked to KIMMY Killian about the patients findings      1136 Talked to Dr. Cohen about  patients findings and next steps       Social Determinants of Health affecting care:   None    Disposition:  The patient was admitted to the hospital under the care of Dr. Leung.     Impression & Plan      Medical Decision Makin-year-old female presenting with right upper quadrant pain and vomiting.  Differential diagnosis includes biliary colic or cholecystitis, peptic ulcer disease, bowel obstruction, pancreatitis, ureteral stone or pyelonephritis.  She does have right upper quadrant tenderness on exam but no Modi sign.  We will plan to check labs and right upper quadrant ultrasound and treat her pain and nausea with Dilaudid and Zofran.    1136 patient's labs are unremarkable, however her ultrasound does show a stone lodged in the gallbladder neck as well as significant extrahepatic biliary dilatation for unknown reason.  Patient's pain has improved with meds but she has persistent nausea.  I discussed with the on-call surgeon, Dr. Cohen, who recommends admission and MRCP for further evaluation.  He advises holding off on antibiotics at this time.  I discussed with the PA, Ksenia Killian, who accepts the admission for Dr. Leung.    Diagnosis:    ICD-10-CM    1. Calculus of gallbladder without cholecystitis without obstruction  K80.20       2. Dilation of biliary tract  K83.8       3. RUQ abdominal pain  R10.11       4. Intractable nausea and vomiting  R11.2              Scribe Disclosure:  VELMA, Doe Anderson, am serving as a scribe at 7:57 AM on 3/11/2023 to document services personally performed by Christopher Loyd MD based on my observations and the provider's statements to me.     3/11/2023   Christopher Loyd MD Goodwin, Shaun M, MD  23 1445

## 2023-03-11 NOTE — PLAN OF CARE
ROOM # 212-1    Living Situation (if not independent, order SW consult):  Facility name:  : [facility] () [phone]                             Rei (son) [phone]    Activity level at baseline: Independent  Activity level on admit: 1 assist     Who will be transporting you at discharge: Rei or she will need transport     Patient registered to observation; given Patient Bill of Rights; given the opportunity to ask questions about observation status and their plan of care.  Patient has been oriented to the observation room, bathroom and call light is in place.    Discussed discharge goals and expectations with patient/family.

## 2023-03-11 NOTE — ED TRIAGE NOTES
Arrives with complaints of left sided abdominal pain since last night. Denies abdominal surgeries. Total of 6 morphine given and 4 of zofran from EMS. ABCs intact in triage.

## 2023-03-11 NOTE — PROGRESS NOTES
Surgery Progress Note:     I reviewed the MRCP. Concern for early cholecystitis. Intra- and extra-hepatic biliary duct dilation but no stones. Some tapering at the ampulla of unclear etiology. She continues to have pain and feels nauseated. Due to concern for cholecystitis I have recommended lap cholecystectomy tomorrow with possible cholangiogram. I ordered Abx. I will consult GI for consideration of ERCP in the near future but with no stones and normal LFTs I think it is reasonable to proceed to surgery to remove the gallbladder first.     I explained the risks and benefits of the procedure to the patient with the risks including postoperative infections, anesthesia, bleeding, open conversion, common bile duct injury, injury to intra-abdominal organs, retained common bile duct stone, bile leak, DVT, PE, hernia, post cholecystectomy diarrhea, recovery, postoperative dietary restrictions and physical limitations.  We have discussed the recommended interventions and treatments for these complications.  All questions have been answered to the best of my ability.     Clay Cohen MD

## 2023-03-12 ENCOUNTER — ANESTHESIA (OUTPATIENT)
Dept: SURGERY | Facility: CLINIC | Age: 78
End: 2023-03-12
Payer: COMMERCIAL

## 2023-03-12 ENCOUNTER — APPOINTMENT (OUTPATIENT)
Dept: GENERAL RADIOLOGY | Facility: CLINIC | Age: 78
End: 2023-03-12
Attending: HOSPITALIST
Payer: COMMERCIAL

## 2023-03-12 ENCOUNTER — ANESTHESIA EVENT (OUTPATIENT)
Dept: SURGERY | Facility: CLINIC | Age: 78
End: 2023-03-12
Payer: COMMERCIAL

## 2023-03-12 LAB
ALBUMIN SERPL BCG-MCNC: 3.2 G/DL (ref 3.5–5.2)
ALP SERPL-CCNC: 61 U/L (ref 35–104)
ALT SERPL W P-5'-P-CCNC: 11 U/L (ref 10–35)
ANION GAP SERPL CALCULATED.3IONS-SCNC: 8 MMOL/L (ref 7–15)
AST SERPL W P-5'-P-CCNC: 18 U/L (ref 10–35)
BILIRUB SERPL-MCNC: 0.5 MG/DL
BUN SERPL-MCNC: 14.1 MG/DL (ref 8–23)
CALCIUM SERPL-MCNC: 8.7 MG/DL (ref 8.8–10.2)
CHLORIDE SERPL-SCNC: 105 MMOL/L (ref 98–107)
CREAT SERPL-MCNC: 0.79 MG/DL (ref 0.51–0.95)
DEPRECATED CALCIDIOL+CALCIFEROL SERPL-MC: 26 UG/L (ref 20–75)
DEPRECATED HCO3 PLAS-SCNC: 26 MMOL/L (ref 22–29)
ERYTHROCYTE [DISTWIDTH] IN BLOOD BY AUTOMATED COUNT: 12.3 % (ref 10–15)
GFR SERPL CREATININE-BSD FRML MDRD: 77 ML/MIN/1.73M2
GLUCOSE SERPL-MCNC: 91 MG/DL (ref 70–99)
HCT VFR BLD AUTO: 35.9 % (ref 35–47)
HGB BLD-MCNC: 11.8 G/DL (ref 11.7–15.7)
MCH RBC QN AUTO: 31.1 PG (ref 26.5–33)
MCHC RBC AUTO-ENTMCNC: 32.9 G/DL (ref 31.5–36.5)
MCV RBC AUTO: 95 FL (ref 78–100)
PLATELET # BLD AUTO: 153 10E3/UL (ref 150–450)
POTASSIUM SERPL-SCNC: 3.8 MMOL/L (ref 3.4–5.3)
PROT SERPL-MCNC: 5.3 G/DL (ref 6.4–8.3)
RBC # BLD AUTO: 3.8 10E6/UL (ref 3.8–5.2)
SODIUM SERPL-SCNC: 139 MMOL/L (ref 136–145)
WBC # BLD AUTO: 7 10E3/UL (ref 4–11)

## 2023-03-12 PROCEDURE — 255N000002 HC RX 255 OP 636: Performed by: STUDENT IN AN ORGANIZED HEALTH CARE EDUCATION/TRAINING PROGRAM

## 2023-03-12 PROCEDURE — 88304 TISSUE EXAM BY PATHOLOGIST: CPT | Mod: TC | Performed by: STUDENT IN AN ORGANIZED HEALTH CARE EDUCATION/TRAINING PROGRAM

## 2023-03-12 PROCEDURE — 250N000011 HC RX IP 250 OP 636: Performed by: PHYSICIAN ASSISTANT

## 2023-03-12 PROCEDURE — 250N000011 HC RX IP 250 OP 636: Performed by: NURSE ANESTHETIST, CERTIFIED REGISTERED

## 2023-03-12 PROCEDURE — 999N000141 HC STATISTIC PRE-PROCEDURE NURSING ASSESSMENT: Performed by: STUDENT IN AN ORGANIZED HEALTH CARE EDUCATION/TRAINING PROGRAM

## 2023-03-12 PROCEDURE — 47563 LAPARO CHOLECYSTECTOMY/GRAPH: CPT | Performed by: STUDENT IN AN ORGANIZED HEALTH CARE EDUCATION/TRAINING PROGRAM

## 2023-03-12 PROCEDURE — 258N000001 HC RX 258: Performed by: STUDENT IN AN ORGANIZED HEALTH CARE EDUCATION/TRAINING PROGRAM

## 2023-03-12 PROCEDURE — 99232 SBSQ HOSP IP/OBS MODERATE 35: CPT | Performed by: NURSE PRACTITIONER

## 2023-03-12 PROCEDURE — 250N000009 HC RX 250: Performed by: NURSE ANESTHETIST, CERTIFIED REGISTERED

## 2023-03-12 PROCEDURE — 85027 COMPLETE CBC AUTOMATED: CPT | Performed by: PHYSICIAN ASSISTANT

## 2023-03-12 PROCEDURE — 96376 TX/PRO/DX INJ SAME DRUG ADON: CPT | Mod: XU

## 2023-03-12 PROCEDURE — 250N000025 HC SEVOFLURANE, PER MIN: Performed by: STUDENT IN AN ORGANIZED HEALTH CARE EDUCATION/TRAINING PROGRAM

## 2023-03-12 PROCEDURE — 250N000011 HC RX IP 250 OP 636: Performed by: STUDENT IN AN ORGANIZED HEALTH CARE EDUCATION/TRAINING PROGRAM

## 2023-03-12 PROCEDURE — 258N000003 HC RX IP 258 OP 636: Performed by: NURSE ANESTHETIST, CERTIFIED REGISTERED

## 2023-03-12 PROCEDURE — 258N000003 HC RX IP 258 OP 636: Performed by: PHYSICIAN ASSISTANT

## 2023-03-12 PROCEDURE — 250N000009 HC RX 250: Performed by: STUDENT IN AN ORGANIZED HEALTH CARE EDUCATION/TRAINING PROGRAM

## 2023-03-12 PROCEDURE — 250N000013 HC RX MED GY IP 250 OP 250 PS 637: Performed by: STUDENT IN AN ORGANIZED HEALTH CARE EDUCATION/TRAINING PROGRAM

## 2023-03-12 PROCEDURE — 360N000083 HC SURGERY LEVEL 3 W/ FLUORO, PER MIN: Performed by: STUDENT IN AN ORGANIZED HEALTH CARE EDUCATION/TRAINING PROGRAM

## 2023-03-12 PROCEDURE — 272N000001 HC OR GENERAL SUPPLY STERILE: Performed by: STUDENT IN AN ORGANIZED HEALTH CARE EDUCATION/TRAINING PROGRAM

## 2023-03-12 PROCEDURE — 96375 TX/PRO/DX INJ NEW DRUG ADDON: CPT | Mod: XU

## 2023-03-12 PROCEDURE — 370N000017 HC ANESTHESIA TECHNICAL FEE, PER MIN: Performed by: STUDENT IN AN ORGANIZED HEALTH CARE EDUCATION/TRAINING PROGRAM

## 2023-03-12 PROCEDURE — 80053 COMPREHEN METABOLIC PANEL: CPT | Performed by: PHYSICIAN ASSISTANT

## 2023-03-12 PROCEDURE — G0378 HOSPITAL OBSERVATION PER HR: HCPCS

## 2023-03-12 PROCEDURE — 36415 COLL VENOUS BLD VENIPUNCTURE: CPT | Performed by: PHYSICIAN ASSISTANT

## 2023-03-12 PROCEDURE — 258N000003 HC RX IP 258 OP 636: Performed by: ANESTHESIOLOGY

## 2023-03-12 PROCEDURE — 710N000009 HC RECOVERY PHASE 1, LEVEL 1, PER MIN: Performed by: STUDENT IN AN ORGANIZED HEALTH CARE EDUCATION/TRAINING PROGRAM

## 2023-03-12 PROCEDURE — 999N000179 XR SURGERY CARM FLUORO LESS THAN 5 MIN W STILLS: Mod: TC

## 2023-03-12 RX ORDER — OXYCODONE HYDROCHLORIDE 5 MG/1
10 TABLET ORAL
Status: DISCONTINUED | OUTPATIENT
Start: 2023-03-12 | End: 2023-03-12 | Stop reason: HOSPADM

## 2023-03-12 RX ORDER — NALOXONE HYDROCHLORIDE 0.4 MG/ML
0.2 INJECTION, SOLUTION INTRAMUSCULAR; INTRAVENOUS; SUBCUTANEOUS
Status: DISCONTINUED | OUTPATIENT
Start: 2023-03-12 | End: 2023-03-13 | Stop reason: HOSPADM

## 2023-03-12 RX ORDER — LIDOCAINE HYDROCHLORIDE 20 MG/ML
INJECTION, SOLUTION INFILTRATION; PERINEURAL PRN
Status: DISCONTINUED | OUTPATIENT
Start: 2023-03-12 | End: 2023-03-12

## 2023-03-12 RX ORDER — MORPHINE SULFATE 1 MG/ML
INJECTION, SOLUTION EPIDURAL; INTRATHECAL; INTRAVENOUS PRN
Status: DISCONTINUED | OUTPATIENT
Start: 2023-03-12 | End: 2023-03-12

## 2023-03-12 RX ORDER — NEOSTIGMINE METHYLSULFATE 1 MG/ML
VIAL (ML) INJECTION PRN
Status: DISCONTINUED | OUTPATIENT
Start: 2023-03-12 | End: 2023-03-12

## 2023-03-12 RX ORDER — ACETAMINOPHEN 325 MG/1
975 TABLET ORAL ONCE
Status: DISCONTINUED | OUTPATIENT
Start: 2023-03-12 | End: 2023-03-12 | Stop reason: HOSPADM

## 2023-03-12 RX ORDER — FENTANYL CITRATE 50 UG/ML
50 INJECTION, SOLUTION INTRAMUSCULAR; INTRAVENOUS EVERY 5 MIN PRN
Status: DISCONTINUED | OUTPATIENT
Start: 2023-03-12 | End: 2023-03-12 | Stop reason: HOSPADM

## 2023-03-12 RX ORDER — LIDOCAINE 40 MG/G
CREAM TOPICAL
Status: DISCONTINUED | OUTPATIENT
Start: 2023-03-12 | End: 2023-03-13 | Stop reason: HOSPADM

## 2023-03-12 RX ORDER — SODIUM CHLORIDE, SODIUM LACTATE, POTASSIUM CHLORIDE, CALCIUM CHLORIDE 600; 310; 30; 20 MG/100ML; MG/100ML; MG/100ML; MG/100ML
INJECTION, SOLUTION INTRAVENOUS CONTINUOUS
Status: DISCONTINUED | OUTPATIENT
Start: 2023-03-12 | End: 2023-03-13 | Stop reason: HOSPADM

## 2023-03-12 RX ORDER — PROPOFOL 10 MG/ML
INJECTION, EMULSION INTRAVENOUS PRN
Status: DISCONTINUED | OUTPATIENT
Start: 2023-03-12 | End: 2023-03-12

## 2023-03-12 RX ORDER — GLYCOPYRROLATE 0.2 MG/ML
INJECTION, SOLUTION INTRAMUSCULAR; INTRAVENOUS PRN
Status: DISCONTINUED | OUTPATIENT
Start: 2023-03-12 | End: 2023-03-12

## 2023-03-12 RX ORDER — FENTANYL CITRATE 50 UG/ML
INJECTION, SOLUTION INTRAMUSCULAR; INTRAVENOUS PRN
Status: DISCONTINUED | OUTPATIENT
Start: 2023-03-12 | End: 2023-03-12

## 2023-03-12 RX ORDER — BUPIVACAINE HYDROCHLORIDE AND EPINEPHRINE 5; 5 MG/ML; UG/ML
INJECTION, SOLUTION PERINEURAL PRN
Status: DISCONTINUED | OUTPATIENT
Start: 2023-03-12 | End: 2023-03-12 | Stop reason: HOSPADM

## 2023-03-12 RX ORDER — FENTANYL CITRATE 50 UG/ML
50 INJECTION, SOLUTION INTRAMUSCULAR; INTRAVENOUS
Status: DISCONTINUED | OUTPATIENT
Start: 2023-03-12 | End: 2023-03-12 | Stop reason: HOSPADM

## 2023-03-12 RX ORDER — ONDANSETRON 4 MG/1
4 TABLET, ORALLY DISINTEGRATING ORAL EVERY 30 MIN PRN
Status: DISCONTINUED | OUTPATIENT
Start: 2023-03-12 | End: 2023-03-12 | Stop reason: HOSPADM

## 2023-03-12 RX ORDER — DEXAMETHASONE SODIUM PHOSPHATE 4 MG/ML
INJECTION, SOLUTION INTRA-ARTICULAR; INTRALESIONAL; INTRAMUSCULAR; INTRAVENOUS; SOFT TISSUE PRN
Status: DISCONTINUED | OUTPATIENT
Start: 2023-03-12 | End: 2023-03-12

## 2023-03-12 RX ORDER — NALOXONE HYDROCHLORIDE 0.4 MG/ML
0.4 INJECTION, SOLUTION INTRAMUSCULAR; INTRAVENOUS; SUBCUTANEOUS
Status: DISCONTINUED | OUTPATIENT
Start: 2023-03-12 | End: 2023-03-13 | Stop reason: HOSPADM

## 2023-03-12 RX ORDER — INDOCYANINE GREEN AND WATER 25 MG
2.5 KIT INJECTION ONCE
Status: COMPLETED | OUTPATIENT
Start: 2023-03-12 | End: 2023-03-12

## 2023-03-12 RX ORDER — ONDANSETRON 2 MG/ML
4 INJECTION INTRAMUSCULAR; INTRAVENOUS EVERY 30 MIN PRN
Status: DISCONTINUED | OUTPATIENT
Start: 2023-03-12 | End: 2023-03-12 | Stop reason: HOSPADM

## 2023-03-12 RX ORDER — HYDROMORPHONE HCL IN WATER/PF 6 MG/30 ML
0.4 PATIENT CONTROLLED ANALGESIA SYRINGE INTRAVENOUS EVERY 5 MIN PRN
Status: DISCONTINUED | OUTPATIENT
Start: 2023-03-12 | End: 2023-03-12 | Stop reason: HOSPADM

## 2023-03-12 RX ORDER — FENTANYL CITRATE 50 UG/ML
25 INJECTION, SOLUTION INTRAMUSCULAR; INTRAVENOUS EVERY 5 MIN PRN
Status: DISCONTINUED | OUTPATIENT
Start: 2023-03-12 | End: 2023-03-12 | Stop reason: HOSPADM

## 2023-03-12 RX ORDER — HYDROMORPHONE HCL IN WATER/PF 6 MG/30 ML
0.2 PATIENT CONTROLLED ANALGESIA SYRINGE INTRAVENOUS EVERY 5 MIN PRN
Status: DISCONTINUED | OUTPATIENT
Start: 2023-03-12 | End: 2023-03-12 | Stop reason: HOSPADM

## 2023-03-12 RX ORDER — PROPOFOL 10 MG/ML
INJECTION, EMULSION INTRAVENOUS CONTINUOUS PRN
Status: DISCONTINUED | OUTPATIENT
Start: 2023-03-12 | End: 2023-03-12

## 2023-03-12 RX ORDER — SODIUM CHLORIDE, SODIUM LACTATE, POTASSIUM CHLORIDE, CALCIUM CHLORIDE 600; 310; 30; 20 MG/100ML; MG/100ML; MG/100ML; MG/100ML
INJECTION, SOLUTION INTRAVENOUS CONTINUOUS
Status: DISCONTINUED | OUTPATIENT
Start: 2023-03-12 | End: 2023-03-12 | Stop reason: HOSPADM

## 2023-03-12 RX ORDER — METOPROLOL TARTRATE 1 MG/ML
1-2 INJECTION, SOLUTION INTRAVENOUS EVERY 5 MIN PRN
Status: DISCONTINUED | OUTPATIENT
Start: 2023-03-12 | End: 2023-03-12 | Stop reason: HOSPADM

## 2023-03-12 RX ORDER — OXYCODONE HYDROCHLORIDE 5 MG/1
5 TABLET ORAL
Status: DISCONTINUED | OUTPATIENT
Start: 2023-03-12 | End: 2023-03-12 | Stop reason: HOSPADM

## 2023-03-12 RX ORDER — ACETAMINOPHEN 325 MG/1
975 TABLET ORAL ONCE
Status: COMPLETED | OUTPATIENT
Start: 2023-03-12 | End: 2023-03-12

## 2023-03-12 RX ORDER — ENOXAPARIN SODIUM 100 MG/ML
40 INJECTION SUBCUTANEOUS
Status: COMPLETED | OUTPATIENT
Start: 2023-03-12 | End: 2023-03-12

## 2023-03-12 RX ORDER — HYDRALAZINE HYDROCHLORIDE 20 MG/ML
2.5-5 INJECTION INTRAMUSCULAR; INTRAVENOUS EVERY 10 MIN PRN
Status: DISCONTINUED | OUTPATIENT
Start: 2023-03-12 | End: 2023-03-12 | Stop reason: HOSPADM

## 2023-03-12 RX ADMIN — FENTANYL CITRATE 100 MCG: 50 INJECTION, SOLUTION INTRAMUSCULAR; INTRAVENOUS at 10:02

## 2023-03-12 RX ADMIN — CARVEDILOL 6.25 MG: 6.25 TABLET, FILM COATED ORAL at 18:29

## 2023-03-12 RX ADMIN — PHENYLEPHRINE HYDROCHLORIDE 100 MCG: 10 INJECTION INTRAVENOUS at 10:19

## 2023-03-12 RX ADMIN — ACETAMINOPHEN 975 MG: 325 TABLET ORAL at 08:19

## 2023-03-12 RX ADMIN — LIDOCAINE HYDROCHLORIDE 50 MG: 20 INJECTION, SOLUTION INFILTRATION; PERINEURAL at 10:02

## 2023-03-12 RX ADMIN — PROPOFOL 130 MG: 10 INJECTION, EMULSION INTRAVENOUS at 10:02

## 2023-03-12 RX ADMIN — INDOCYANINE GREEN AND WATER 2.5 MG: KIT at 08:23

## 2023-03-12 RX ADMIN — METRONIDAZOLE 500 MG: 500 INJECTION, SOLUTION INTRAVENOUS at 05:20

## 2023-03-12 RX ADMIN — SODIUM CHLORIDE, POTASSIUM CHLORIDE, SODIUM LACTATE AND CALCIUM CHLORIDE: 600; 310; 30; 20 INJECTION, SOLUTION INTRAVENOUS at 08:33

## 2023-03-12 RX ADMIN — KETOROLAC TROMETHAMINE 15 MG: 15 INJECTION, SOLUTION INTRAMUSCULAR; INTRAVENOUS at 14:58

## 2023-03-12 RX ADMIN — ONDANSETRON 4 MG: 2 INJECTION INTRAMUSCULAR; INTRAVENOUS at 11:14

## 2023-03-12 RX ADMIN — ENOXAPARIN SODIUM 40 MG: 40 INJECTION SUBCUTANEOUS at 08:30

## 2023-03-12 RX ADMIN — MORPHINE SULFATE 4 MG: 1 INJECTION EPIDURAL; INTRATHECAL; INTRAVENOUS at 10:47

## 2023-03-12 RX ADMIN — PHENYLEPHRINE HYDROCHLORIDE 200 MCG: 10 INJECTION INTRAVENOUS at 10:04

## 2023-03-12 RX ADMIN — DEXAMETHASONE SODIUM PHOSPHATE 8 MG: 4 INJECTION, SOLUTION INTRA-ARTICULAR; INTRALESIONAL; INTRAMUSCULAR; INTRAVENOUS; SOFT TISSUE at 10:02

## 2023-03-12 RX ADMIN — PHENYLEPHRINE HYDROCHLORIDE 200 MCG: 10 INJECTION INTRAVENOUS at 10:54

## 2023-03-12 RX ADMIN — PHENYLEPHRINE HYDROCHLORIDE 300 MCG: 10 INJECTION INTRAVENOUS at 10:07

## 2023-03-12 RX ADMIN — PROPOFOL 50 MCG/KG/MIN: 10 INJECTION, EMULSION INTRAVENOUS at 10:12

## 2023-03-12 RX ADMIN — OXYCODONE HYDROCHLORIDE 2.5 MG: 5 TABLET ORAL at 20:48

## 2023-03-12 RX ADMIN — ROCURONIUM BROMIDE 30 MG: 50 INJECTION, SOLUTION INTRAVENOUS at 10:02

## 2023-03-12 RX ADMIN — GLYCOPYRROLATE 0.4 MG: 0.2 INJECTION, SOLUTION INTRAMUSCULAR; INTRAVENOUS at 11:13

## 2023-03-12 RX ADMIN — SODIUM CHLORIDE, POTASSIUM CHLORIDE, SODIUM LACTATE AND CALCIUM CHLORIDE: 600; 310; 30; 20 INJECTION, SOLUTION INTRAVENOUS at 04:06

## 2023-03-12 RX ADMIN — NEOSTIGMINE METHYLSULFATE 3 MG: 1 INJECTION, SOLUTION INTRAVENOUS at 11:13

## 2023-03-12 RX ADMIN — PHENYLEPHRINE HYDROCHLORIDE 200 MCG: 10 INJECTION INTRAVENOUS at 10:59

## 2023-03-12 ASSESSMENT — ACTIVITIES OF DAILY LIVING (ADL)
ADLS_ACUITY_SCORE: 22

## 2023-03-12 ASSESSMENT — LIFESTYLE VARIABLES: TOBACCO_USE: 1

## 2023-03-12 NOTE — PROGRESS NOTES
[facility]    Medicine Progress Note - Hospitalist Service    Date of Admission:  3/11/2023    Assessment & Plan   Kandis Tse is a 77 year old female with PMHx significant for hypertension, prior CVA,  nonobstructive CAD, prior stress cardiomyopathy, breast cancer status post lumpectomy, osteoporosis, gastritis, MDD, anxiety, who was admitted on 3/11/2023 with abdominal pain concerning for biliary colic.      Biliary Colic  Cholelithiasis  Developed acute onset RUQ pain, nausea, non bloody emesis PM of 3/10. No fever, leukocytosis or hepatitis. CMP reassuring. RUQ US Marked extrahepatic biliary dilatation of uncertain etiology, stone does appear lodged in the neck of the gallbladder.  ED consulted with general surgery who recommended admission with MRCP.   She reports prior episodes of similar symptoms although less severe.   - MRCP 3/11/2023  - follow CMP  - Gen Surgery consult, s/p Lap orion 3/12  - analgesia, antiemetics PRN   - ADAT  - Likely discharge tomorrow AM.      History of Stress Cardiomyopathy 2015, since resolved  Non obstructive CAD  No anginal symptoms, change in exercise tolerance, dyspnea etc.  Had been previously followed by [facility] for Takotsubo cardiomyopathy.  Per chart review at [facility] with minimal CAD.  - hold PTA asa, continue Coreg  - no further cardiac clearance needed     MDD/Anxiety  - continue PTA selective serotonin reuptake inhibitor     Gastritis  If this upper endoscopy about 1 year ago.  - PPI  - avoid po nsaids as able     CVA  Reported 10+ y/o ago without deficits.        Diet:   ADAT  DVT Prophylaxis: Pneumatic Compression Devices and Ambulate every shift  Whiteside Catheter: Not present  Lines: None     Cardiac Monitoring: None  Code Status: Full Code      Clinically Significant Risk Factors Present on Admission           # Hypercalcemia: corrected calcium is >10.1, will monitor as appropriate    # Hypoalbuminemia: Lowest albumin = 3.2  g/dL at 3/12/2023  5:32 AM, will monitor as appropriate                  Disposition Plan     Expected Discharge Date: 03/12/2023                The patient's care was discussed with the Attending Physician, Dr. Zuluaga, Bedside Nurse and Patient.    KELLIE [facility]  Hospitalist Service  [facility]  Securely message with [employer] (more info)  Text page via [employer] Paging/Directory   ______________________________________________________________________    Interval History   S/p Lab orion today. Feeling well overall, pain controlled.     Physical Exam   Vital Signs: Temp: 98.5  F (36.9  C) Temp src: Oral BP: 120/62 Pulse: 78   Resp: 16 SpO2: 92 % O2 Device: None (Room air) Oxygen Delivery: 6 LPM  Weight: 142 lbs 0 oz    GENERAL: No apparent distress.  HEENT: Patient is normocephalic, atraumatic.  EYES: Anicteric without injection.  RESPIRATORY: Clear to auscultation bilaterally.  CARDIOVASCULAR: Regular rate and rhythm.  Normal S1, S2 - no m/g/r.  GASTROINTESTINAL: The abdomen was normal in contour. Soft, non-tender without distension. Lap sites CDI  EXTREMITIES: Warm & well perfused. No edema  NEUROLOGIC: The patient was alert and conversation was appropriate. Grossly non-focal.  PSYCHIATRIC: Mood and affect congruent.  SKIN: Exposed skin is within normal limits.      Medical Decision Making       30 MINUTES SPENT BY ME on the date of service doing chart review, history, exam, documentation & further activities per the note.      Data     I have personally reviewed the following data over the past 24 hrs:    7.0  \   11.8   / 153     139 105 14.1 /  91   3.8 26 0.79 \       ALT: 11 AST: 18 AP: 61 TBILI: 0.5   ALB: 3.2 (L) TOT PROTEIN: 5.3 (L) LIPASE: N/A       Imaging results reviewed over the past 24 hrs:   Recent Results (from the past 24 hour(s))   MR Abdomen MRCP w/o & w Contrast    Narrative    EXAM: MR ABDOMEN MRCP W/O and W CONTRAST  LOCATION: [facility]  DATE/TIME: 3/11/2023 4:24 PM    INDICATION: gallstones, intracble nausea  COMPARISON: Same date ultrasound.  TECHNIQUE: Routine MR liver/pancreas protocol including axial and coronal MRCP sequences. 2D and 3D reconstruction performed by MR technologist including MIP reconstruction and slab cholangiograms. If performed with contrast, additional dynamic T1 post   IV contrast images.   CONTRAST: 6.5 mL Gadavist     FINDINGS:     MRCP: There is significant intra and extrahepatic biliary ductal dilation as described on recent ultrasound with focal tapering at the ampulla. No evidence of choledocholithiasis. The gallbladder is distended with some wall edema. Likely impacted stone   in the gallbladder neck/cystic duct is again noted (series 7 image 13). No definite pericholecystic fluid.    LIVER: No morphologic changes of chronic liver disease. No significant iron or fat deposition. No focal liver lesion visualized.    PANCREAS: Normal parenchymal signal and enhancement without ductal dilation or surrounding inflammation.    ADDITIONAL FINDINGS: Bilateral breast prostheses. Spleen and adrenal glands are unremarkable. No hydronephrosis. No lymphadenopathy or ascites. No evidence of bowel obstruction. No acute bony abnormality. Stable compression deformities in the   thoracolumbar spine.      Impression    IMPRESSION:  1.  Distended gallbladder with wall edema and likely impacted stone in the gallbladder neck/cystic duct, early acute cholecystitis possible.  2.  Significant intra and extrahepatic biliary ductal dilation with focal tapering at the ampulla, could be seen in the setting of ampullary stenosis, but could also consider ERCP for direct visualization and exclusion of underlying obstructive lesion.  3.  No visualized choledocholithiasis.   XR Surgery SUE L/T 5 Min Fluoro w Stills    Narrative    This exam was marked as non-reportable because it will not be read by a   radiologist or a [place]  non-radiologist provider.

## 2023-03-12 NOTE — PLAN OF CARE
Goal Outcome Evaluation:    PRIMARY DIAGNOSIS: ACUTE RIGHT SIDED ABDOMINAL PAIN  OUTPATIENT/OBSERVATION GOALS TO BE MET BEFORE DISCHARGE:  1. Pain Status: Improved but still requiring IV narcotics.    2. Return to near baseline physical activity: No    3. Cleared for discharge by consultants (if involved): No    Discharge Planner Nurse   Safe discharge environment identified: Yes  Barriers to discharge: Yes       Entered by: Francia Christie RN 03/11/2023   /60 (BP Location: Left arm)   Pulse 90   Temp 97.5  F (36.4  C) (Oral)   Resp 16   LMP  (LMP Unknown)   SpO2 97%     Please review provider order for any additional goals.   Nurse to notify provider when observation goals have been met and patient is ready for discharge.

## 2023-03-12 NOTE — PLAN OF CARE
PRIMARY DIAGNOSIS: CHOLECYSTITIS  OUTPATIENT/OBSERVATION GOALS TO BE MET BEFORE DISCHARGE:  ADLs back to baseline: Yes    Activity and level of assistance: Up with standby assistance.    Pain status: Improved-controlled with oral pain medications.    Return to near baseline physical activity: Yes     Discharge Planner Nurse   Safe discharge environment identified: Yes  Barriers to discharge: No       Entered by: Vince Pemberton RN 03/12/2023 12:00 AM     Please review provider order for any additional goals.   Nurse to notify provider when observation goals have been met and patient is ready for discharge.Goal Outcome Evaluation:

## 2023-03-12 NOTE — PLAN OF CARE
PRIMARY DIAGNOSIS: LAP SHELBY  OUTPATIENT/OBSERVATION GOALS TO BE MET BEFORE DISCHARGE:  1. Stable vital signs Yes  2. Tolerating diet:Yes tolerated Full  3. Pain controlled with oral pain medications:  No  4. Positive bowel sounds:  Yes  5. Voiding without difficulty:  Yes  6. Able to ambulate:  Yes  7. Provider specific discharge goals met:  No    Discharge Planner Nurse   Safe discharge environment identified: Yes  Barriers to discharge: Yes       Entered by: JOHANNA KENDALL RN 03/12/2023    Vital signs:  Temp: 97.6  F (36.4  C) Temp src: Axillary BP: 133/76 Pulse: 86   Resp: 16 SpO2: 94 % O2 Device: None (Room air) Alert and oriented x4. Mild pain in the abdomen. Ice pack in use. PRN Toradol given. Denies nausea, vomiting, CP, SOB. Lap sites x4 c.d.I. in the abdomen. BS present and active. On Full liquid diet. IVF saline locked. CAPNO in place. Sat stable in room air. Assist of x1 post up. Ind at baseline. Will continue to monitor.   Please review provider order for any additional goals.   Nurse to notify provider when observation goals have been met and patient is ready for discharge.

## 2023-03-12 NOTE — ANESTHESIA CARE TRANSFER NOTE
Patient: Kandis Tse    Procedure: Procedure(s):  CHOLECYSTECTOMY, LAPAROSCOPIC, WITH CHOLANGIOGRAM       Diagnosis: Calculus of gallbladder without cholecystitis without obstruction [K80.20]  Diagnosis Additional Information: No value filed.    Anesthesia Type:   General     Note:    Oropharynx: oropharynx clear of all foreign objects and spontaneously breathing  Level of Consciousness: awake  Oxygen Supplementation: face mask  Level of Supplemental Oxygen (L/min / FiO2): 8  Independent Airway: airway patency satisfactory and stable  Dentition: dentition unchanged  Vital Signs Stable: post-procedure vital signs reviewed and stable  Report to RN Given: handoff report given  Patient transferred to: PACU  Comments: Pt to recovery.  Spontaneous respirations and exchanging well.  Pt making needs known.  Report given to RN.    Handoff Report: Identifed the Patient, Identified the Reponsible Provider, Reviewed the pertinent medical history, Discussed the surgical course, Reviewed Intra-OP anesthesia mangement and issues during anesthesia, Set expectations for post-procedure period and Allowed opportunity for questions and acknowledgement of understanding      Vitals:  Vitals Value Taken Time   /79 03/12/23 1125   Temp     Pulse 98 03/12/23 1126   Resp 21 03/12/23 1126   SpO2 100 % 03/12/23 1126   Vitals shown include unvalidated device data.    Electronically Signed By: KELLIE Hardwick CRNA  March 12, 2023  11:27 AM

## 2023-03-12 NOTE — PLAN OF CARE
Goal Outcome Evaluation:    PRIMARY DIAGNOSIS: ACUTE RIGHT SIDED ABDOMINAL PAIN  OUTPATIENT/OBSERVATION GOALS TO BE MET BEFORE DISCHARGE:  1. Pain Status: Improved but still requiring IV narcotics.    2. Return to near baseline physical activity: No    3. Cleared for discharge by consultants (if involved): No    Discharge Planner Nurse   Safe discharge environment identified: Yes  Barriers to discharge: Yes       Entered by: Francia Christie RN 03/11/2023   /60 (BP Location: Left arm)   Pulse 90   Temp 97.5  F (36.4  C) (Oral)   Resp 16   LMP  (LMP Unknown)   SpO2 97%    Pt alert & oriented x4. Verbalizes pain a 5/10.Dialudid administered.Denies N/V. VSS on RA. Pt. Is NPO, ice chips ok. SBA with mobility.LR infusing @ 100ml/hr.Lap cholecystectomy 3/12.    Please review provider order for any additional goals.   Nurse to notify provider when observation goals have been met and patient is ready for discharge.

## 2023-03-12 NOTE — BRIEF OP NOTE
[facility]    Brief Operative Note    Pre-operative diagnosis: Calculus of gallbladder without cholecystitis without obstruction [K80.20]  Post-operative diagnosis acute cholecystitis     Procedure: Procedure(s):  CHOLECYSTECTOMY, LAPAROSCOPIC, WITH CHOLANGIOGRAM  Surgeon: Surgeon(s) and Role:     * Clay Cohen MD - Primary     * Lyn Gonzalez PA-C - Assisting  Anesthesia: General   Estimated Blood Loss: 15 ml     Drains: None  Specimens:   ID Type Source Tests Collected by Time Destination   1 : Gallbladder and Contents Tissue Gallbladder with Stone(s) SURGICAL PATHOLOGY EXAM Clay Cohen MD 3/12/2023 11:10 AM      Findings:   edematous, acutely inflamed gallbladder, cholangiogram with robust flow into the duodenum requiring morphine to obtain complete cholangiogram of intra-hepatic ducts.  Complications: None.  Implants: * No implants in log *

## 2023-03-12 NOTE — ANESTHESIA PREPROCEDURE EVALUATION
Anesthesia Pre-Procedure Evaluation    Patient: Kandis Tse   MRN: [id number] : 1945        Procedure : Procedure(s):  CHOLECYSTECTOMY, LAPAROSCOPIC, WITH POSSIBLE CHOLANGIOGRAM          Past Medical History:   Diagnosis Date     Anxiety      Breast cancer (H)      Hx of radiation therapy      MI, old      Osteoarthritis     hips     Osteopenia      Peripheral vertigo      PONV (postoperative nausea and vomiting)      Stress-induced cardiomyopathy      T12 compression fracture (H)       Past Surgical History:   Procedure Laterality Date     ARTHROPLASTY HIP Left 3/16/2021    Procedure: Left total hip arthroplasty (Peterson [employer]hew 52 mm acetabulum; #10 [employer] high offset stem; -2 neck 36 mm head);  Surgeon: Dante Becker MD;  Location: [facility] OR     BIOPSY BREAST Left Early      BREAST SURGERY      breast biopsy, breast lumpectomy     COSMETIC SURGERY      augmentation mammaplasty     GYN SURGERY      hysterectomy     HYSTERECTOMY  Early      IR THORACIC VERTEBROPLASTY  10/11/2016     LUMPECTOMY BREAST Left Early      MAMMOPLASTY AUGMENTATION Bilateral Late      OPEN REDUCTION INTERNAL FIXATION HIP NAILING Right 2022    Procedure: RIGHT INTERTROCHANTERIC FEMUR NAILING;  Surgeon: Karthik Carcamo MD;  Location:  OR     ORTHOPEDIC SURGERY Bilateral     bunionectomy/10 toes repaired     OTHER SURGICAL HISTORY      lumpectomy     REPAIR HAMMER TOE Bilateral 2018    Procedure: REPAIR HAMMER TOE;  RIGHT SECOND, FOURTH, AND FIFTH  MALLET TOE RECONSTRUCTION WITH LEFT THIRD CLAWTOE RECONSTRUCTION (CHOICE)  ;  Surgeon: Edgar Carlisle MD;  Location:  OR     WRIST SURGERY Left      Z TOTAL ABDOM HYSTERECTOMY      Description: Hysterectomy;  Recorded: 2013;      No Known Allergies   Social History     Tobacco Use     Smoking status: Former     Smokeless tobacco: Never   Substance Use Topics     Alcohol use: No      Wt Readings from Last 1  Encounters:   03/09/23 64.6 kg (142 lb 8 oz)        Anesthesia Evaluation   Pt has had prior anesthetic.     History of anesthetic complications  - PONV.      ROS/MED HX  ENT/Pulmonary:     (+) tobacco use, Past use,  (-) sleep apnea   Neurologic:     (+) CVA, without deficits,     Cardiovascular:     (+) hypertension--CAD ---    METS/Exercise Tolerance:     Hematologic:       Musculoskeletal:       GI/Hepatic:     (+) cholecystitis/cholelithiasis,     Renal/Genitourinary:       Endo:       Psychiatric/Substance Use:     (+) psychiatric history anxiety     Infectious Disease:       Malignancy:   (+) Malignancy, History of Breast.    Other:            Physical Exam    Airway        Mallampati: II   TM distance: > 3 FB   Neck ROM: full     Respiratory Devices and Support         Dental           Cardiovascular   cardiovascular exam normal          Pulmonary   pulmonary exam normal                OUTSIDE LABS:  CBC:   Lab Results   Component Value Date    WBC 7.0 03/12/2023    WBC 7.8 03/11/2023    HGB 11.8 03/12/2023    HGB 14.2 03/11/2023    HCT 35.9 03/12/2023    HCT 41.6 03/11/2023     03/12/2023     03/11/2023     BMP:   Lab Results   Component Value Date     03/12/2023     03/11/2023    POTASSIUM 3.8 03/12/2023    POTASSIUM 4.3 03/11/2023    CHLORIDE 105 03/12/2023    CHLORIDE 103 03/11/2023    CO2 26 03/12/2023    CO2 24 03/11/2023    BUN 14.1 03/12/2023    BUN 15.1 03/11/2023    CR 0.79 03/12/2023    CR 0.87 03/11/2023    GLC 91 03/12/2023     (H) 03/11/2023     COAGS: No results found for: PTT, INR, FIBR  POC:   Lab Results   Component Value Date     (H) 04/10/2009     HEPATIC:   Lab Results   Component Value Date    ALBUMIN 3.2 (L) 03/12/2023    PROTTOTAL 5.3 (L) 03/12/2023    ALT 11 03/12/2023    AST 18 03/12/2023    ALKPHOS 61 03/12/2023    BILITOTAL 0.5 03/12/2023     OTHER:   Lab Results   Component Value Date    GAVIN 8.7 (L) 03/12/2023    PHOS 3.8 04/12/2022     LIPASE 25 03/11/2023    TSH 1.78 04/12/2022    T4 0.99 04/12/2022       Anesthesia Plan    ASA Status:  3   NPO Status:  NPO Appropriate    Anesthesia Type: General.     - Airway: ETT   Induction: Intravenous.   Maintenance: Balanced.        Consents    Anesthesia Plan(s) and associated risks, benefits, and realistic alternatives discussed. Questions answered and patient/representative(s) expressed understanding.    - Discussed:     - Discussed with:  Patient         Postoperative Care    Pain management: IV analgesics, Oral pain medications, Multi-modal analgesia.   PONV prophylaxis: Ondansetron (or other 5HT-3), Dexamethasone or Solumedrol     Comments:                Cristobal Emanuel MD

## 2023-03-12 NOTE — PLAN OF CARE
PRIMARY DIAGNOSIS: LAP SHELBY  OUTPATIENT/OBSERVATION GOALS TO BE MET BEFORE DISCHARGE:  1. Stable vital signs Yes  2. Tolerating diet: yes- on clears   3. Pain controlled with oral pain medications:   haven;t given pain med yet  4. Positive bowel sounds:  Yes  5. Voiding without difficulty:  No due to void  6. Able to ambulate:  Yes  7. Provider specific discharge goals met:  No    Discharge Planner Nurse   Safe discharge environment identified: Yes  Barriers to discharge: Yes       Entered by: JOHANNA KENDALL RN 03/12/2023    Vital signs:  Temp: 98.5  F (36.9  C) Temp src: Oral BP: 120/62 Pulse: 78   Resp: 16 SpO2: 92 % O2 Device: None (Room air) Alert and oriented x4. Mild pain in the abdomen. Ice pack in use. Denies nausea, vomiting, CP, SOB. Lap sites x4 c.d.I. in the abdomen. BS present and active. On clear liquid diet. LR infusing @100mL/hr. CAPNO in place. Sat stable in room air. Assist of x1 post up. Ind at baseline. Will continue to monitor.   Please review provider order for any additional goals.   Nurse to notify provider when observation goals have been met and patient is ready for discharge.

## 2023-03-12 NOTE — OP NOTE
[facility] General Surgery Operative Note    Pre-operative diagnosis: acute cholecystitis   Post-operative diagnosis: same   Procedure: laparoscopic cholecystectomy  intraoperative cholangiogram   Surgeon: Clay Cohen MD   Assistant(s): Lyn Gonzalez PA-C  The Physician Assistant was medically necessary for their expertise in prepping, camera management, suctioning, suturing and retraction.   Anesthesia: General and local anesthesia with 0.5% marcaine and epinephrine    Estimated blood loss:  Specimen: 15 cc  gallbladder and contents               INDICATION FOR OPERATION: This is a 77 year old female who presented to ED with abdominal pain. Studies including ultrasound and MRCP were consistent with acute cholecystitis but also bile duct dilation of unclear etiology. We discussed laparoscopic cholecystectomy with cholangiogram and the patient agreed to proceed after hearing the risks and benefits. Please see my consult and progress notes for a full discussion on the decision to proceed to surgery.     DESCRIPTION OF PROCEDURE:  The patient was taken to the operating room and placed on the table in supine position.  General endotracheal anesthesia was induced and the abdomen was prepped and draped in standard sterile fashion.      A 10-mm port was placed in the infra-umbilical position using the Sadie technique.  Following smooth establishment of a pneumoperitoneum, three 5-mm ports were placed under direct laparoscopic visualization, one subxiphoid and two right flank working ports.  The abdomen was briefly surveyed for pathology or injury during port placement; no pathology or injuries were seen.      The patient was then placed in reverse Trendelenburg and right side up.  The gallbladder appeared edematous and and significantly distended. The common bile duct could also be visualized and was grossly distended as well. The fundus of the gallbladder was grasped and retracted cephalad. The  infundibulum was grasped and retracted laterally.  The peritoneum over the medial and lateral aspects of the triangle of Calot was taken down with the [place] dissector, suction  and modest amounts of Bovie electrocautery.  The cystic duct and artery were freed up from surrounding tissues.  The triangle of Calot was skeletonized revealing the critical view of safety. Intraoperative fluorescence was also used to visualize the course of the common bile duct and cystic duct with no other ductal structures seen.    Intraoperative cholangiogram was then completed. A Alvarez clamp was placed on the infundibulum and the catheter advanced into the cystic duct. The duct was flushed with saline without leakage. Cholangiogram was then performed. However, there was very robust filling of the duodenum and the proximal bile ducts were not opacifying. I therefore gave 4 mg of morphine and waited 5 minutes. I then obtained another cholangiogram with complete filling of bilateral hepatic ducts and common bile duct with emptying into the duodenum and no evidence of filling defects to indicate stones. The Xray was saved and the catheter and Alvarez clamp were removed.     The cystic artery was clipped twice, once distally and transected with the scissors. The cystic duct was ligated with a 2-0 Silk tied intra-corporeally on the specimen side, the duct was then transected with scissors and secured with an 0-PDS endo-loop on the stay side.  The gallbladder was then removed from the liver using the hook electrocautery.  There was no spillage of bile from the gallbladder during dissection. The gallbladder was passed into an Endocatch bag and removed from the abdomen. We observed the right upper quadrant carefully for hemostasis.  Hemostasis was assured.  The abdomen was irrigated with saline.     The 5 mm laparoscopic ports were removed under direct visualization and were hemostatic. The Sadie and port was then removed and the  pneumoperitoneum was evacuated.  The fascia was reapproximated using an 0 Vicryl stitch.  All port sites were then closed with absorbable intracuticular suture.  SteriStrips and sterile bandages were then applied.  The patient was then woken, extubated, and transported to the Postanesthesia Care Unit in satisfactory condition. Sponge and needle counts were correct on two counts at the conclusion of the procedure.     FINDINGS:   1.) acutely inflamed gallbladder with grossly dilated common bile duct noted intra-operatively   2.) cholangiogram with very robust filling of the duodenum. I had to therefore give morphine to contract the sphincter to opacify the proximal ducts to complete the cholangiogram. No obvious filling defects seen on cholangiogram.     Clay Cohen MD

## 2023-03-12 NOTE — ANESTHESIA POSTPROCEDURE EVALUATION
----- Message from Kalie Rushing MD sent at 4/20/2021  7:53 AM CDT -----  Normal results please call patient.    Patient: Kandis Tse    Procedure: Procedure(s):  CHOLECYSTECTOMY, LAPAROSCOPIC, WITH CHOLANGIOGRAM       Anesthesia Type:  General    Note:  Disposition: Inpatient   Postop Pain Control: Uneventful            Sign Out: Well controlled pain   PONV: No   Neuro/Psych: Uneventful            Sign Out: Acceptable/Baseline neuro status   Airway/Respiratory: Uneventful            Sign Out: Acceptable/Baseline resp. status   CV/Hemodynamics: Uneventful            Sign Out: Acceptable CV status; No obvious hypovolemia; No obvious fluid overload   Other NRE: NONE   DID A NON-ROUTINE EVENT OCCUR? No           Last vitals:  Vitals Value Taken Time   /65 03/12/23 1205   Temp 97.3  F (36.3  C) 03/12/23 1200   Pulse 71 03/12/23 1212   Resp 18 03/12/23 1212   SpO2 95 % 03/12/23 1212   Vitals shown include unvalidated device data.    Electronically Signed By: Cristobal Emanuel MD  March 12, 2023  2:42 PM

## 2023-03-12 NOTE — ANESTHESIA PROCEDURE NOTES
Airway       Patient location during procedure: OR       Procedure Start/Stop Times: 3/12/2023 10:04 AM  Staff -        CRNA: Ofelia Vasquez APRN CRNA       Performed By: CRNA  Consent for Airway        Urgency: elective  Indications and Patient Condition       Indications for airway management: agusto-procedural       Induction type:intravenous       Mask difficulty assessment: 1 - vent by mask    Final Airway Details       Final airway type: endotracheal airway       Successful airway: ETT - single  Endotracheal Airway Details        ETT size (mm): 7.0       Cuffed: yes       Successful intubation technique: direct laryngoscopy       DL Blade Type: Sher 2       Grade View of Cords: 1       Adjucts: stylet       Position: Right       Measured from: gums/teeth       Secured at (cm): 21       Bite block used: None    Post intubation assessment        Placement verified by: capnometry, equal breath sounds and chest rise        Number of attempts at approach: 1       Number of other approaches attempted: 0       Secured with: plastic tape       Ease of procedure: easy       Dentition: Intact and Unchanged    Medication(s) Administered   Medication Administration Time: 3/12/2023 10:04 AM

## 2023-03-12 NOTE — PLAN OF CARE
PRIMARY DIAGNOSIS: CHOLECYSTITIS  OUTPATIENT/OBSERVATION GOALS TO BE MET BEFORE DISCHARGE:  1. ADLs back to baseline: Yes    2. Activity and level of assistance: Up with standby assistance.    3. Pain status: Pain free.    4. Return to near baseline physical activity: Yes     Discharge Planner Nurse   Safe discharge environment identified: Yes  Barriers to discharge: No       Entered by: Vince Pemberton RN 03/12/2023 3:11 AM   Pt is A & O x 4, LR @ 100 ml/hr, SBA with mobility, lap cholecystectomy scheduled for 3/12/23, NPO, Rocephin & Flagyl iv.  Please review provider order for any additional goals.   Nurse to notify provider when observation goals have been met and patient is ready for discharge.Goal Outcome Evaluation:

## 2023-03-12 NOTE — CARE PLAN
Patient is alert and oriented x4. VS WNL and documented on the FS. Lung sounds clear and patient is on RA. Hypoactive bowel sounds. Patient states LBM was this morning. Patient states she has not voided much, but has been NPO. IV fluids infusing at 100 ml/hr. Patient continues on Flagyl Q12 hrs and Rocephin Q24hrs. Patient having nausea with emesis and Zofran given (PO medications held d/t nausea). NPO. Patient rating right abdominal pain a 2-3/10 and declined pain medication at this time.     MR abdomen showed:  Early cholecystitis. Intra- and extra-hepatic biliary duct dilation but no stones.      Plan:  GI following and lap orion tomorrow at 0930    /74 (BP Location: Left arm, Patient Position: Semi-Garcia's, Cuff Size: Adult Regular)   Pulse 83   Temp 97.7  F (36.5  C) (Oral)   Resp 16   LMP  (LMP Unknown)   SpO2 100%

## 2023-03-13 VITALS
SYSTOLIC BLOOD PRESSURE: 99 MMHG | OXYGEN SATURATION: 98 % | RESPIRATION RATE: 16 BRPM | DIASTOLIC BLOOD PRESSURE: 55 MMHG | HEART RATE: 57 BPM | BODY MASS INDEX: 22.08 KG/M2 | WEIGHT: 142 LBS | TEMPERATURE: 98.3 F

## 2023-03-13 PROCEDURE — 99239 HOSP IP/OBS DSCHRG MGMT >30: CPT | Performed by: PHYSICIAN ASSISTANT

## 2023-03-13 PROCEDURE — G0378 HOSPITAL OBSERVATION PER HR: HCPCS

## 2023-03-13 PROCEDURE — 250N000013 HC RX MED GY IP 250 OP 250 PS 637: Performed by: STUDENT IN AN ORGANIZED HEALTH CARE EDUCATION/TRAINING PROGRAM

## 2023-03-13 RX ORDER — ACETAMINOPHEN 325 MG/1
650 TABLET ORAL EVERY 6 HOURS PRN
COMMUNITY
Start: 2023-03-13

## 2023-03-13 RX ORDER — OXYCODONE HYDROCHLORIDE 5 MG/1
2.5 TABLET ORAL EVERY 4 HOURS PRN
Qty: 5 TABLET | Refills: 0 | Status: SHIPPED | OUTPATIENT
Start: 2023-03-13 | End: 2023-10-18

## 2023-03-13 RX ADMIN — OXYCODONE HYDROCHLORIDE 2.5 MG: 5 TABLET ORAL at 14:17

## 2023-03-13 RX ADMIN — ACETAMINOPHEN 650 MG: 325 TABLET, FILM COATED ORAL at 12:18

## 2023-03-13 RX ADMIN — OXYCODONE HYDROCHLORIDE 5 MG: 5 TABLET ORAL at 00:11

## 2023-03-13 RX ADMIN — OXYCODONE HYDROCHLORIDE 2.5 MG: 5 TABLET ORAL at 08:50

## 2023-03-13 RX ADMIN — CARVEDILOL 6.25 MG: 6.25 TABLET, FILM COATED ORAL at 08:25

## 2023-03-13 RX ADMIN — CITALOPRAM HYDROBROMIDE 20 MG: 20 TABLET ORAL at 08:25

## 2023-03-13 ASSESSMENT — ACTIVITIES OF DAILY LIVING (ADL)
ADLS_ACUITY_SCORE: 22

## 2023-03-13 NOTE — PROGRESS NOTES
PRIMARY DIAGNOSIS: LAP SHELBY  OUTPATIENT/OBSERVATION GOALS TO BE MET BEFORE DISCHARGE:    1. Pain status: Improved-controlled with oral pain medications.  2. Stable vital signs and labs (if performed) at disposition: Yes  3. Tolerating adequate PO diet: Yes  4. Successful cholecystectomy or clear follow up plan with General Surgery team if immediate surgery not performed Surgery performed  5. ADLs back to baseline?  Yes  6. Activity and level of assistance: Up with standby assistance.  7. Barriers to discharge noted No    Discharge Planner Nurse   Safe discharge environment identified: Yes  Barriers to discharge: No       Entered by: Noelle Valenzuela RN 03/12/2023 11:28 PM     Please review provider order for any additional goals.   Nurse to notify provider when observation goals have been met and patient is ready for discharge.

## 2023-03-13 NOTE — DISCHARGE INSTRUCTIONS
HOME CARE FOLLOWING LAPAROSCOPIC CHOLECYSTECTOMY  SEBASTIÁN Dugan, STELLA Pabon C. Pratt, J. Shaheen    INCISIONAL CARE:  Leave the steri-strips (white paper tapes) in place for 14 days after surgery.     BATHING:  OK to shower 48 hours after surgery.  Avoid baths for 1 week after surgery.  You may wash your hair at any time.  Gently pat your incision dry after bathing.  Do not apply lotions, creams, or ointments to incisions.    ACTIVITY:  Light Activity -- you may immediately be up and about as tolerated.  Walking is encouraged, increase as tolerated.  Driving/Light Work-- when comfortable and off narcotic pain medications.  Strenuous Work/Activity -- limit lifting to 20 pounds for 2 weeks.  Progressively increase with time.  Active Sports (running, biking, etc.) -- cautiously resume after 2 weeks.    DIET:  Start with liquids and gradually increase diet as tolerated.  Drink plenty of fluids.  While taking pain medications, consider use of a stool softener, increase your fiber in your diet, or add a fiber supplement (like Metamucil, Citrucel) to help prevent constipation - a possible side effect of pain medications.  It is not uncommon to experience some bowel changes (loose stools or constipation) after surgery.  Your body has to adapt to you no longer having a gall bladder.  To help minimize this side effect, avoid fatty foods for 1-2 weeks after surgery.  You may then slowly increase the amount of fatty foods in your diet.      FOLLOW-UP AFTER SURGERY:  -Our office will contact you approximately 2-3 weeks after surgery to check on your progress and answer any questions you may have.  If you are doing well, you will not need to return for an office appointment.  If any concerns are identified over the phone, we will help you make an appointment to see a provider.    -If you have not received a phone call, have any questions or concerns, or would like to be seen, please call us at  [phone].  We are located at: [address] [facility], [address]; [place] [address]    -CONTACT US IF THE FOLLOWING DEVELOPS:   1. A fever that is above 101     2. Increased redness, warmth, drainage, bleeding, or swelling.   3. Pain that is not relieved by rest/ice and your prescription.   4.  Increasing pain after 48 hours.   5. Drainage that is thick, cloudy, yellow, green or white.      If you have other questions, please call the office Monday thru Friday between 8am and 4:30pm to discuss with the nurse or physician assistant.  #[phone]    There is a surgeon ON CALL on weekday evenings and over the weekend in case of urgent need only, and may be contacted at the same number.    If you are having an emergency, call 911 or proceed to your nearest emergency department.      Eating a Low-fat Diet  Your doctor has ordered a low-fat diet for you. You must limit your total fat intake to ______ grams per day. Do not worry about the type of fat, only the total amount of fat.  You should eat fat-free milk products and lean meats. Avoid pastries and desserts. Limit margarine, butter, salad dressings and other added fats.  The guidelines below will help you keep your diet low in fat.  Avoid cooking with fat and deep-fat frying. Food will soak up some of the cooking fat. Use non-stick pans or coat your pans with non-stick spray.  Do not eat deep-fat fried foods.  Trim all the fat from meats and remove the skin from chicken and turkey.  Meats may be boiled, poached, steamed, broiled, roasted, stewed, grilled or cooked in a microwave. Place meats for roasting on a rack so the fat can drain off while cooking.  Try dishes without meat such as chili with beans, lentil soup and pasta with meatless tomato sauce.  Look for low-fat or no-fat milk products.  Cool homemade soups and stews in the fridge. Skim the fat from the top before serving.  If you feel hungry, snack on low-fat yogurt, fresh fruit, raw vegetables and  low-fat dip.  Avoid avocado, peanuts and other nuts. These are high in fat.  Season foods with lemon, herbs, spices or imitation butter.  Also, it is important to know the amount of fat in food products. You can read nutrition labels to find the fat content. See the back of this sheet for directions on how to read nutrition labels.  How to read nutrition labels   You will find a nutrition label on most food items you buy. This is a tool to help you make good food choices.  First, look at the serving size. This tells you how large one serving will be.   Then, look at the servings per container to check how many servings are in each package. Even a small package can have more than one serving. If you eat two servings, you will get twice the number of fat grams listed on the label.   Total fat is the total amount of saturated, trans fat, polyunsaturated and monounsaturated fats in one serving. The type of fat is not important, just the total amount of fat you eat each day.     For informational purposes only. Not to replace the advice of your health care provider.   Copyright   2006 [facility]. All rights reserved. isocket 085772 - REV 09/15.        Low-Fat Diet     A low-fat diet will help you lose weight. It also can lower cholesterol and prevent symptoms of gallbladder disease. The average American diet contains up to 50% fat. This means that half of all calories come from fat (about 80 grams to 100 grams of fat per day). Choosing normal portions of foods from the list below can help lower your fat intake. Experts recommend that only 20% to 35% of your daily calories come from fat. The remaining 65% to 80% of calories will come from protein and carbohydrates.  Breads  OK: Whole-wheat or rye bread, zac or soda crackers, zuleima toast, plain rolls, bagels, English muffins  Don't have: Rolls and breads containing whole milk or egg; waffles, pancakes, biscuits, corn bread; cheese crackers, other  flavored crackers, pastries, doughnuts  Cereals  OK: Oatmeal, whole-wheat, bran, multigrain, rice  Don't have:Granola or other cereals that have oil, coconut, or more than 2 grams of fat per serving  Cheese and eggs  OK: Cheeses labeled low-fat; 3 whole eggs per week; egg whites and egg substitutes as desired  Don't have: All other cheeses  Desserts  OK: Gelatin, slushy, laura food cake, meringues, nonfat yogurt, and puddings or sherbet made with nonfat milk  Don't have: Any other store-bought desserts, or desserts that have fat, whole milk, cream, chocolate, and coconut  Drinks  OK: Nonfat milk, coffee, tea, fizzy (carbonated) drinks  Don't have:Whole and reduced-fat milk, evaporated and condensed milk, hot chocolate mixes, milk shakes, malts, eggnog  Fats  OK: You may have up to 3 teaspoons of fat daily. This can be butter, margarine, mayonnaise, or healthy oils (canola or olive)  Don't have: Cream, nondairy creams, cream cheese, gravies, and cream sauces  Fruits  OK: All fruits made without fat  Don't have: Coconut, olives  Meats, poultry, fish  OK: Limit meat to 6 ounces daily (broiled, roasted, baked, grilled, or boiled). Buy lean cuts, and trim off the fat. Try beef, fish, lamb, pork, and canned fish packed in water; also chicken and turkey with the skin removed.  Don't have: Fried meats, fish, or poultry; fried eggs, and fish canned in oils; fatty meats such as lawson, sausage, corned beef, hot dogs, and lunch meats; meats with gravies and sauces  Potatoes, beans, pasta  OK: Dried beans, split peas, lentils, potatoes, rice, pasta made without added fat  Don't have: French fries, potato chips, potatoes prepared with butter, refried beans  Soups  OK: Clear broth soups without fat and with allowed vegetables  Don't have: Cream-based soups  Vegetables  OK: Fresh, frozen, canned or dried vegetables, all made without added fat  Don't have: Fried vegetables and those prepared with butter, cream, sauces  Other  foods  OK: Salt, sugar, jelly, hard candy, marshmallows, honey, syrup, spices and herbs, mustard, ketchup, lemon, and vinegar. Try to limit sweets and added sugars.  Don't have: Chocolate, nuts, coconut, and cream candies; sunflower, sesame, and other seeds; fried foods; cream sauces and gravies; pizza  [employer]y last reviewed this educational content on 10/1/2019    0480-9591 [employer]. All rights reserved. This information is not intended as a substitute for professional medical care. Always follow your healthcare professional's instructions.

## 2023-03-13 NOTE — PLAN OF CARE
PRIMARY DIAGNOSIS: s/p lap orion  OUTPATIENT/OBSERVATION GOALS TO BE MET BEFORE DISCHARGE:  1. Stable vital signs Yes  2. Tolerating diet:Yes  3. Pain controlled with oral pain medications:  Yes  4. Positive bowel sounds:  Yes  5. Voiding without difficulty:  Yes  6. Able to ambulate:  Yes  7. Provider specific discharge goals met:  Yes    Discharge Planner Nurse   Safe discharge environment identified: Yes  Barriers to discharge: No       Entered by: Giovanna Rodriguez RN 03/13/2023     Please review provider order for any additional goals.   Nurse to notify provider when observation goals have been met and patient is ready for discharge.    AOx4. Up ad mariama. Lap sites x4- c/d/I.  Pain 4-5/10 in abd, one low dose oxy given and will re-assess. Tolerated PO this am- denies nausea. Probable discharge home today.

## 2023-03-13 NOTE — PLAN OF CARE
PRIMARY DIAGNOSIS: s/p lap orion  OUTPATIENT/OBSERVATION GOALS TO BE MET BEFORE DISCHARGE:  1. Stable vital signs Yes  2. Tolerating diet:Yes  3. Pain controlled with oral pain medications:  Yes  4. Positive bowel sounds:  Yes  5. Voiding without difficulty:  Yes  6. Able to ambulate:  Yes  7. Provider specific discharge goals met:  Yes    Discharge Planner Nurse   Safe discharge environment identified: Yes  Barriers to discharge: No       Entered by: Giovanna Rodriguez RN 03/13/2023     Please review provider order for any additional goals.   Nurse to notify provider when observation goals have been met and patient is ready for discharge.    AOx4. Up ad mariama. Lap sites x4- c/d/I.  Pain 4-5/10 in abd, one low dose oxy given and tylenol/ice given with improvement. Tolerated PO this am- denies nausea. Discharge home today- son to transport around 1400

## 2023-03-13 NOTE — PROGRESS NOTES
[facility]   General Surgery Progress Note           Assessment and Plan:   Assessment:   POD#1 s/p Procedure(s):  CHOLECYSTECTOMY, LAPAROSCOPIC, WITH CHOLANGIOGRAM        Plan:   -Discharge home today. Rx Oxycodone.  -Surgery PA will follow up by telephone in 2-3 weeks.   Agree with plan for discharge today.      Interval History:   Afebrile. Feels much improved from preop. Postop pain well-controlled with PO meds. Tolerated cereal for breakfast. + passing flatus. Up walking in room, voiding normally. Feels ready for home.         Physical Exam:   Blood pressure 135/62, pulse 57, temperature 97.6  F (36.4  C), temperature source Oral, resp. rate 16, weight 64.4 kg (142 lb), SpO2 98 %.    I/O last 3 completed shifts:  In: 820 [P.O.:120; I.V.:700]  Out: 15 [Blood:15]    Abdomen:   soft, non-distended, tenderness noted in the right upper quadrant   Inc(s) - clean, dry, intact. Ecchymosis at umbilical incision.            Data:   Blood culture:  No results found for this or any previous visit.   Urine culture:  Results for orders placed or performed in visit on 04/21/22   Urine Culture Aerobic Bacterial - lab collect    Specimen: Urine, NOS   Result Value Ref Range    Culture 10,000-50,000 CFU/mL Mixture of urogenital gunner      Recent Labs   Lab 03/12/23  0532 03/11/23  0810 03/09/23  1039   WBC 7.0 7.8 5.9   HGB 11.8 14.2 14.8   HCT 35.9 41.6 44.5   MCV 95 92 93    210 254       Cassia Boyer PA-C

## 2023-03-13 NOTE — PLAN OF CARE
Patient's After Visit Summary was reviewed with patient.   Patient verbalized understanding of After Visit Summary, recommended follow up and was given an opportunity to ask questions.   Discharge medications sent home with patient/family: YES-oxycodone  Discharged with son    OBSERVATION patient END time: 1427

## 2023-03-13 NOTE — PROGRESS NOTES
PRIMARY DIAGNOSIS: LAP SHELBY  OUTPATIENT/OBSERVATION GOALS TO BE MET BEFORE DISCHARGE:    1. Pain status: Improved-controlled with oral pain medications.  2. Stable vital signs and labs (if performed) at disposition: Yes  3. Tolerating adequate PO diet: Yes; full liquids  4. Successful cholecystectomy or clear follow up plan with General Surgery team if immediate surgery not performed Surgery performed  5. ADLs back to baseline?  Yes  6. Activity and level of assistance: Up with standby assistance.  7. Barriers to discharge noted No    Discharge Planner Nurse   Safe discharge environment identified: Yes  Barriers to discharge: No       Entered by: Noelle Valenzuela RN 03/12/2023 11:28 PM     Please review provider order for any additional goals.   Nurse to notify provider when observation goals have been met and patient is ready for discharge.

## 2023-03-13 NOTE — DISCHARGE SUMMARY
[facility] Discharge Summary          Kandis Tse MRN# [id number]   Age: 77 year old YOB: 1945     Date of Admission:  3/11/2023  Date of Discharge::  3/13/2023  Admitting Physician:  Liu eLung MD  Discharge Physician:  Deena Patel PA-C  Primary Physician: Yoko Conde     Physician Attestation   I have reviewed and discussed with the advanced practice provider their discharge plan for Kandis Tse. I did not participate in a shared visit by interviewing or examining the patient and this should be billed as an advanced practice provider only discharge.    Ana María Álvarez MD  Date of Service (when I saw the patient): I did not personally see this patient today.    Primary Discharge Diagnoses:   Acute Cholecystitis s/p Laparoscopic Cholecystectomy     Hospital Course:   For detail history, please refer to H & P from 3/11/2023. In brief, this is a 77 year old female with PMHx significant for hypertension, prior CVA,  nonobstructive CAD, prior stress cardiomyopathy, breast cancer status post lumpectomy, osteoporosis, gastritis, MDD, anxiety, who was admitted on 3/11/2023 with abdominal pain.     Acute Cholecystitis  Pt developed acute onset RUQ pain, nausea, non bloody emesis PM of 3/10. No fever, leukocytosis or hepatitis. CMP reassuring. RUQ US revealed marked extrahepatic biliary dilatation of uncertain etiology, stone does appear lodged in the neck of the gallbladder.  ED consulted with general surgery who recommended admission with MRCP.   - MRCP 3/11/2023 revealed concern for early cholecystitis. Intra- and extra-hepatic biliary duct dilation but no stones. Some tapering at the ampulla of unclear etiology.  - treated with IV Ceftriaxone  - General Surgery was consulted and patient underwent a Laparoscopic Cholecystectomy on 3/12/23 with negative intraoperative cholangiogram  - patient to follow up with PCP and General Surgery in  clinic.    Procedures/Imaging:     Results for orders placed or performed during the hospital encounter of 03/11/23   US Abdomen Limited (RUQ)    Narrative    EXAM: ULTRASOUND ABDOMEN LIMITED  LOCATION: [facility]  DATE/TIME: 03/11/2023, 10:04 AM    INDICATION: Right upper quadrant pain, vomiting,  COMPARISON: None.  TECHNIQUE: Limited abdominal ultrasound.    FINDINGS:    GALLBLADDER: Cholelithiasis without cholecystitis. 1.9 cm stone appears lodged in the neck of the gallbladder. No gallbladder wall thickening.    BILE DUCTS: Marked extrahepatic biliary dilatation. The common duct measures 16 mm. No definite choledocholithiasis.    LIVER: Normal parenchyma with smooth contour. No focal mass.    RIGHT KIDNEY: No hydronephrosis.    PANCREAS: The visualized portions are normal.    No ascites.      Impression    IMPRESSION:  1.  Marked extrahepatic biliary dilatation of uncertain etiology. If the patient can adequately breath-hold, consider MRCP for further evaluation.  2.  Cholelithiasis without cholecystitis. A stone does appear lodged in the neck of the gallbladder.     MR Abdomen MRCP w/o & w Contrast    Narrative    EXAM: MR ABDOMEN MRCP W/O and W CONTRAST  LOCATION: [facility]  DATE/TIME: 3/11/2023 4:24 PM    INDICATION: gallstones, intracble nausea  COMPARISON: Same date ultrasound.  TECHNIQUE: Routine MR liver/pancreas protocol including axial and coronal MRCP sequences. 2D and 3D reconstruction performed by MR technologist including MIP reconstruction and slab cholangiograms. If performed with contrast, additional dynamic T1 post   IV contrast images.   CONTRAST: 6.5 mL Gadavist     FINDINGS:     MRCP: There is significant intra and extrahepatic biliary ductal dilation as described on recent ultrasound with focal tapering at the ampulla. No evidence of choledocholithiasis. The gallbladder is distended with some wall edema. Likely impacted stone   in the  gallbladder neck/cystic duct is again noted (series 7 image 13). No definite pericholecystic fluid.    LIVER: No morphologic changes of chronic liver disease. No significant iron or fat deposition. No focal liver lesion visualized.    PANCREAS: Normal parenchymal signal and enhancement without ductal dilation or surrounding inflammation.    ADDITIONAL FINDINGS: Bilateral breast prostheses. Spleen and adrenal glands are unremarkable. No hydronephrosis. No lymphadenopathy or ascites. No evidence of bowel obstruction. No acute bony abnormality. Stable compression deformities in the   thoracolumbar spine.      Impression    IMPRESSION:  1.  Distended gallbladder with wall edema and likely impacted stone in the gallbladder neck/cystic duct, early acute cholecystitis possible.  2.  Significant intra and extrahepatic biliary ductal dilation with focal tapering at the ampulla, could be seen in the setting of ampullary stenosis, but could also consider ERCP for direct visualization and exclusion of underlying obstructive lesion.  3.  No visualized choledocholithiasis.   XR Surgery SUE L/T 5 Min Fluoro w Stills    Narrative    This exam was marked as non-reportable because it will not be read by a   radiologist or a [place] non-radiologist provider.           Allergies:   No Known Allergies     Subjective:   Pain controlled.  Denies any nausea.  Tolerated breakfast well.  Feels ready to discharge home    Physical Exam:   Blood pressure 135/62, pulse 57, temperature 97.6  F (36.4  C), temperature source Oral, resp. rate 16, weight 64.4 kg (142 lb), SpO2 98 %.  General: Alert, interactive, NAD  HEENT: AT/NC, sclera anicteric, PERRL, EOMI, OP clear with MMM  Neck: Supple, no JVD or cervical LAD  Resp: clear to auscultation bilaterally, no crackles or wheezes  Cardiac: regular rate and rhythm, no murmur  Abdomen: Soft, nontender, nondistended. Decreased BS.  Incisions c/d/i  Extremities: No LE edema  Skin: Warm and dry  Neuro:  Alert & oriented x 3     Discharge Medicatios:        Current Discharge Medication List      START taking these medications    Details   acetaminophen (TYLENOL) 325 MG tablet Take 2 tablets (650 mg) by mouth every 6 hours as needed for mild pain or other (and adjunct with moderate or severe pain or per patient request)    Associated Diagnoses: Calculus of gallbladder without cholecystitis without obstruction      oxyCODONE (ROXICODONE) 5 MG tablet Take 0.5 tablets (2.5 mg) by mouth every 4 hours as needed  Qty: 5 tablet, Refills: 0    Associated Diagnoses: Calculus of gallbladder without cholecystitis without obstruction         CONTINUE these medications which have NOT CHANGED    Details   albuterol (PROAIR HFA/PROVENTIL HFA/VENTOLIN HFA) 108 (90 Base) MCG/ACT inhaler Inhale 2 puffs into the lungs as needed for shortness of breath or wheezing  Qty: 8.5 g, Refills: 1    Comments: Pharmacy may dispense brand covered by insurance (Proair, or proventil or ventolin or generic albuterol inhaler) Profile Rx: patient will contact pharmacy when needed  Associated Diagnoses: Cough, unspecified type      aspirin 81 MG EC tablet Take 81 mg by mouth every morning      carvedilol (COREG) 6.25 MG tablet Take 1 tablet (6.25 mg) by mouth 2 times daily (with meals)  Qty: 180 tablet, Refills: 3    Comments: Profile Rx: patient will contact pharmacy when needed  Associated Diagnoses: Takotsubo cardiomyopathy      citalopram (CELEXA) 20 MG tablet Take 1 tablet (20 mg) by mouth daily  Qty: 90 tablet, Refills: 3    Comments: Profile Rx: patient will contact pharmacy when needed  Associated Diagnoses: Mood disorder (H)      metroNIDAZOLE (METROGEL) 0.75 % external gel Apply topically 2 times daily Apply to face      Multiple Vitamin (ONE DAILY MULTIVITAMIN ADULT PO) Take 1 tablet by mouth daily      oxybutynin ER (DITROPAN XL) 5 MG 24 hr tablet Take 1 tablet (5 mg) by mouth daily  Qty: 90 tablet, Refills: 3    Comments: Profile Rx:  patient will contact pharmacy when needed  Associated Diagnoses: [facility] incontinence      spironolactone (ALDACTONE) 100 MG tablet Take 1 tablet by mouth daily      alendronate (FOSAMAX) 70 MG tablet Take 1 tablet (70 mg) by mouth every 7 days  Qty: 12 tablet, Refills: 3    Associated Diagnoses: Senile osteoporosis             Instructions Given to Patient as Discharge:     Discharge Procedure Orders   Reason for your hospital stay   Order Comments: You were hospitalized due to abdominal pain which was from your gallbladder.  General Surgery was consulted for gallbladder removal.     Activity   Order Comments: Your activity upon discharge: activity as tolerated     Order Specific Question Answer Comments   Is discharge order? Yes      Follow-up and recommended labs and tests    Order Comments: Follow up with PCP in one week.  Follow up with General Surgery in clinic in 2 weeks.     Diet   Order Comments: Follow this diet upon discharge: Orders Placed This Encounter      Advance Diet as Tolerated: Regular Diet Adult     Order Specific Question Answer Comments   Is discharge order? Yes        Pending Tests at Discharge:   Surgical pathology    Discharge Disposition:     Discharged to home     Deena Patel MS, PA-C  Hospitalist Service  Pager [phone]    >30 minutes was spent in discharge planning, care coordination, physical examination and medication reconciliation on the date of discharge, 3/13/2023

## 2023-03-13 NOTE — PROGRESS NOTES
PRIMARY DIAGNOSIS: LAP SHELBY  OUTPATIENT/OBSERVATION GOALS TO BE MET BEFORE DISCHARGE:    1. Pain status: Improved-controlled with oral pain medications.  2. Stable vital signs and labs (if performed) at disposition: Yes  3. Tolerating adequate PO diet: Yes  4. Successful cholecystectomy or clear follow up plan with General Surgery team if immediate surgery not performed Surgery performed  5. ADLs back to baseline?  Yes  6. Activity and level of assistance: Up with standby assistance.  7. Barriers to discharge noted No    Discharge Planner Nurse   Safe discharge environment identified: Yes  Barriers to discharge: No       Entered by: Noelle Valenzuela RN 03/12/2023 11:28 PM     VSS on RA. AxOx4. Up SBA. Pt endorsing pain, controlled with PRN Oxycodone x2. Denies n/v/SOB/chest pain. PIV x2 SL. Voiding, passing flatus. Lap sites c/d/i. CMS intact. Plan is to discharge today, Son able to  pt at ~1600. Will continue to provide supportive cares     Please review provider order for any additional goals.   Nurse to notify provider when observation goals have been met and patient is ready for discharge.

## 2023-03-14 ENCOUNTER — PATIENT OUTREACH (OUTPATIENT)
Dept: CARE COORDINATION | Facility: CLINIC | Age: 78
End: 2023-03-14
Payer: COMMERCIAL

## 2023-03-14 LAB
PATH REPORT.COMMENTS IMP SPEC: NORMAL
PATH REPORT.FINAL DX SPEC: NORMAL
PATH REPORT.GROSS SPEC: NORMAL
PATH REPORT.MICROSCOPIC SPEC OTHER STN: NORMAL
PATH REPORT.RELEVANT HX SPEC: NORMAL
PHOTO IMAGE: NORMAL

## 2023-03-14 PROCEDURE — 88304 TISSUE EXAM BY PATHOLOGIST: CPT | Mod: 26 | Performed by: PATHOLOGY

## 2023-03-14 NOTE — PROGRESS NOTES
"Clinic Care Coordination Contact  [facility]: Post-Discharge Note  SITUATION                                                      Admission:    Admission Date: 03/11/23   Reason for Admission: You were hospitalized due to abdominal pain which was from your gallbladder. General Surgery was consulted  for gallbladder removal.  Discharge:   Discharge Date: 03/13/23  Discharge Diagnosis: You were hospitalized due to abdominal pain which was from your gallbladder. General Surgery was consulted  for gallbladder removal.    BACKGROUND                                                      Per hospital discharge summary and inpatient provider notes:    Kandis Tse is a 77 year old female who presented to the ED for evaluation of abdominal pain.   Ms. Tse developed right-sided abdominal pain described as \"twinging \"this developed last night after an evening meal.  She had pain throughout the night preventing her from sleep.  She did not try any medications for the pain.  She had 5 episodes of emesis 1 that was described as \"bright yellow \".  Associated nausea and dry heaves.  Still having pain after medications in the ED but is improving.  Has nausea.  No fever, diarrhea.  She reports history of similar episodes of abdominal pain although less severe.  She last had surgery 1 year ago after hip fracture.  She reports history of coronary artery disease and had a's MI about 7 years ago.  She has not had any chest pain or shortness of breath with activities such as weightlifting exercises or climbing the stairs.  She takes a baby aspirin daily is not on anticoagulation.      ASSESSMENT           Discharge Assessment  How are you doing now that you are home?: I am doing well.  How are your symptoms? (Red Flag symptoms escalate to triage hotline per guidelines): Improved  Do you feel your condition is stable enough to be safe at home until your provider visit?: Yes  Does the patient have their discharge " instructions? : Yes  Does the patient have questions regarding their discharge instructions? : No  Were you started on any new medications or were there changes to any of your previous medications? : Yes  Does the patient have all of their medications?: Yes  Do you have questions regarding any of your medications? : No  Discharge follow-up appointment scheduled within 14 calendar days? : No (Pt is waiting to hear from general surgery to schedule follow up. Pt is also going to call her PCP to let them know what happened but she just saw her PCP on 3/9 and doesn't think she will go again.)  Is patient agreeable to assistance with scheduling? : No    Post-op (JAYDA CTA Only)  If the patient had a surgery or procedure, do they have any questions for a nurse?: No         PLAN                                                      Outpatient Plan: Follow up with PCP in one week. Follow up with General Surgery in clinic in 2 weeks.    No future appointments.      For any urgent concerns, please contact our 24 hour nurse triage line: [phone] ([phone])         JAYDA [place]  [phone]  [facility]

## 2023-03-17 PROBLEM — M81.0 SENILE OSTEOPOROSIS: Status: ACTIVE | Noted: 2023-03-17

## 2023-03-31 ENCOUNTER — MYC MEDICAL ADVICE (OUTPATIENT)
Dept: PEDIATRICS | Facility: CLINIC | Age: 78
End: 2023-03-31
Payer: COMMERCIAL

## 2023-03-31 DIAGNOSIS — E78.2 MIXED HYPERLIPIDEMIA: Primary | ICD-10-CM

## 2023-04-04 ENCOUNTER — TELEPHONE (OUTPATIENT)
Dept: SURGERY | Facility: CLINIC | Age: 78
End: 2023-04-04
Payer: COMMERCIAL

## 2023-04-04 NOTE — TELEPHONE ENCOUNTER
Attempted to call patient for post op check.  Kandis was not home.  Spoke with  who thought she was doing well. Message was left for Kandis to call back if she has any questions of concerns.     Lyn Gonzalez PA-C

## 2023-04-11 ENCOUNTER — OFFICE VISIT (OUTPATIENT)
Dept: PEDIATRICS | Facility: CLINIC | Age: 78
End: 2023-04-11
Payer: COMMERCIAL

## 2023-04-11 VITALS
RESPIRATION RATE: 20 BRPM | HEIGHT: 67 IN | TEMPERATURE: 97.8 F | BODY MASS INDEX: 21.91 KG/M2 | OXYGEN SATURATION: 98 % | WEIGHT: 139.6 LBS | DIASTOLIC BLOOD PRESSURE: 56 MMHG | SYSTOLIC BLOOD PRESSURE: 84 MMHG | HEART RATE: 85 BPM

## 2023-04-11 DIAGNOSIS — I95.9 HYPOTENSION, UNSPECIFIED HYPOTENSION TYPE: Primary | ICD-10-CM

## 2023-04-11 DIAGNOSIS — M62.81 GENERALIZED MUSCLE WEAKNESS: ICD-10-CM

## 2023-04-11 DIAGNOSIS — J18.9 PNEUMONIA OF RIGHT UPPER LOBE DUE TO INFECTIOUS ORGANISM: ICD-10-CM

## 2023-04-11 PROCEDURE — 99213 OFFICE O/P EST LOW 20 MIN: CPT | Performed by: PHYSICIAN ASSISTANT

## 2023-04-11 RX ORDER — ATORVASTATIN CALCIUM 20 MG/1
20 TABLET, FILM COATED ORAL DAILY
Qty: 90 TABLET | Refills: 3 | Status: SHIPPED | OUTPATIENT
Start: 2023-04-11 | End: 2024-07-07

## 2023-04-11 ASSESSMENT — PAIN SCALES - GENERAL: PAINLEVEL: NO PAIN (0)

## 2023-04-11 NOTE — PROGRESS NOTES
"  Assessment & Plan     Hypotension, unspecified hypotension type  Patient weak and hypotensive. She would benefit from repeat labs and IV fluids. Patient referred to ED for further evaluation. She is unable to go to [facility] due to timing.     Generalized muscle weakness      Pneumonia of right upper lobe due to infectious organism  Continue with antibiotics.     BARRON Perez [facility] JOSE Marquis is a 77 year old, presenting for the following health issues:  uc follow up    HPI   ED/UC Followup:  Facility:  [facility]  Date of visit: 4/7   Reason for visit: Pneumonia--right upper lobe  On doxy and augmentin   Tolerating well  CTA and CT abd/pelvis normal besides pna   Current Status: no change, feels terrible    Patient still coughing and difficulty stopping. Post tussive vomiting.   Weak. Fatigued.   Sleeping with an incline at night and this helps.     Wheezing present. Productive cough.     No sinus pressure. No ear pain.     Coloring more pale than usual.     Review of Systems   Constitutional, HEENT, cardiovascular, pulmonary, gi and gu systems are negative, except as otherwise noted.      Objective    BP (!) 84/56 (BP Location: Right arm, Patient Position: Sitting, Cuff Size: Adult Regular)   Pulse 85   Temp 97.8  F (36.6  C) (Tympanic)   Resp 20   Ht 1.708 m (5' 7.25\")   Wt 63.3 kg (139 lb 9.6 oz)   LMP  (LMP Unknown)   SpO2 98%   BMI 21.70 kg/m    Body mass index is 21.7 kg/m .  Physical Exam   GENERAL: fatigued, ill appearing, no distress  EYES: Eyes grossly normal to inspection, PERRL and conjunctivae and sclerae normal  HENT: ear canals and TM's normal, nose and mouth without ulcers or lesions  NECK: no adenopathy  RESP: rhonchi in the right upper lobe  CV: regular rate and rhythm, normal S1 S2, no S3 or S4    No results found for any visits on 04/11/23.    "

## 2023-04-11 NOTE — TELEPHONE ENCOUNTER
Called pt at [phone] to get details.     Pt started 2 abx(Augmentin & Doxy) on 4/7, so far she had 7 doses of abx. Still has productive cough which makes her to gag. Had 2 episodes of vomiting since starting abx. Last night she ate food, then took her abx, then coughing so bad which made her to vomit the whole content. Since then no vomiting.     The 2 main concerns at this time is exhaustion & cough. She feels very tired, no energy at all and sleeping a lot. Cough was very dry, but now it changed to be a productive cough, coughing up while/yellow phlegm.     Denies fever, chills, HA, ST, dizziness, chest congestion, SOB, wheezing, trouble breathing/swallowing, GI sx's, vomiting blood or body aches. Appetite is low, but she is able to keep food & fluids down.     Using albuterol inhaler 2-3 times a day which helps. Not taking any cough suppressant.     Scheduled an OV with Erica this afternoon for an eval.     IVÁN Ruiz  Patient Advocate Liason (PAL)  [facility]  Ph. [phone] / Fax. [phone]

## 2023-04-11 NOTE — TELEPHONE ENCOUNTER
Can we please call patient and see how she is doing and if she needs to be seen in clinic this afternoon by Erica?    Yoko Conde MD  Internal Medicine-Pediatrics

## 2023-07-13 ENCOUNTER — TELEPHONE (OUTPATIENT)
Dept: PEDIATRICS | Facility: CLINIC | Age: 78
End: 2023-07-13
Payer: COMMERCIAL

## 2023-08-01 NOTE — Clinical Note
Can we help patient schedule DEXA, lab appointment and mammogram at same visit? [Negative] : Heme/Lymph

## 2023-10-13 ENCOUNTER — TELEPHONE (OUTPATIENT)
Dept: PEDIATRICS | Facility: CLINIC | Age: 78
End: 2023-10-13

## 2023-10-13 NOTE — TELEPHONE ENCOUNTER
Pt LM at 8:42 am requesting call back to help schedule an appointment for pre-op. Call back at [phone].     Nae - please call pt to help schedule an appointment. Thanks.     IVÁN Ruiz  Patient Advocate Liason (PAL)  [facility]  Ph. [phone] / Fax. [phone]

## 2023-10-16 NOTE — TELEPHONE ENCOUNTER
Pt has pre-op scheduled with PCP on 10/18/23, closing encounter.    Vianney Infante MA 3:37 PM 10/16/2023

## 2023-10-18 ENCOUNTER — OFFICE VISIT (OUTPATIENT)
Dept: PEDIATRICS | Facility: CLINIC | Age: 78
End: 2023-10-18
Payer: COMMERCIAL

## 2023-10-18 VITALS
BODY MASS INDEX: 21.51 KG/M2 | DIASTOLIC BLOOD PRESSURE: 60 MMHG | HEART RATE: 85 BPM | TEMPERATURE: 97.5 F | WEIGHT: 145.2 LBS | SYSTOLIC BLOOD PRESSURE: 104 MMHG | OXYGEN SATURATION: 99 % | RESPIRATION RATE: 18 BRPM | HEIGHT: 69 IN

## 2023-10-18 DIAGNOSIS — Z86.73 HISTORY OF CVA (CEREBROVASCULAR ACCIDENT): ICD-10-CM

## 2023-10-18 DIAGNOSIS — Z01.818 PRE-OP EXAM: Primary | ICD-10-CM

## 2023-10-18 DIAGNOSIS — H25.9 SENILE CATARACT OF LEFT EYE, UNSPECIFIED AGE-RELATED CATARACT TYPE: ICD-10-CM

## 2023-10-18 DIAGNOSIS — I51.81 TAKOTSUBO CARDIOMYOPATHY: ICD-10-CM

## 2023-10-18 PROCEDURE — 99214 OFFICE O/P EST MOD 30 MIN: CPT | Performed by: INTERNAL MEDICINE

## 2023-10-18 ASSESSMENT — PAIN SCALES - GENERAL: PAINLEVEL: NO PAIN (0)

## 2023-10-18 NOTE — PATIENT INSTRUCTIONS
Continue all medications the way you are - ok to take morning medications with a small sip of water the morning of surgery. No need to hold aspirin.     Bring albuterol the morning of surgery.

## 2023-10-18 NOTE — PROGRESS NOTES
[facility]  [address]  [address]  [place] [address]  Phone: [phone]  Fax: [phone]  Primary Provider: Yoko Mcfarland  Pre-op Performing Provider: YOKO MCFARLAND      PREOPERATIVE EVALUATION:  Today's date: 10/18/2023    Kandis is a 78 year old female who presents for a preoperative evaluation.      10/18/2023    10:06 AM   Additional Questions   Roomed by Dana         10/18/2023    10:06 AM   Patient Reported Additional Medications   Patient reports taking the following new medications none       Surgical Information:  Surgery/Procedure: Cataract  Surgery Location: [facility]  Surgeon: Albino  Surgery Date: 11/13/23  Time of Surgery: TBD  Where patient plans to recover: At home with family  Fax number for surgical facility: [phone]    Assessment & Plan     The proposed surgical procedure is considered LOW risk.    Pre-op exam  Senile cataract of left eye, unspecified age-related cataract type  Patient medically optimized to proceed with above surgery. She will not hold medications prior to surgery.     Takotsubo cardiomyopathy  Continue on beta blocker, statin, asa. Is otherwise asymptomatic.     History of CVA (cerebrovascular accident)  She will continue asa perioperatively.       - No identified additional risk factors other than previously addressed    Antiplatelet or Anticoagulation Medication Instructions:   - Bleeding risk is low for this procedure (e.g. dental, skin, cataract).    Additional Medication Instructions:  Patient is to take all scheduled medications on the day of surgery    RECOMMENDATION:  APPROVAL GIVEN to proceed with proposed procedure, without further diagnostic evaluation.        Subjective       HPI related to upcoming procedure: Patient is scheduled for L cataract removal and lens replacement.         10/18/2023    10:04 AM   Preop Questions   1. Have you ever had a heart attack or stroke? YES - history  of Takotsubo cardiomyopathy, history of stroke.    2. Have you ever had surgery on your heart or blood vessels, such as a stent placement, a coronary artery bypass, or surgery on an artery in your head, neck, heart, or legs? No   3. Do you have chest pain with activity? No   4. Do you have a history of  heart failure? No   5. Do you currently have a cold, bronchitis or symptoms of other infection? No   6. Do you have a cough, shortness of breath, or wheezing? No   7. Do you or anyone in your family have previous history of blood clots? No   8. Do you or does anyone in your family have a serious bleeding problem such as prolonged bleeding following surgeries or cuts? No   9. Have you ever had problems with anemia or been told to take iron pills? No   10. Have you had any abnormal blood loss such as black, tarry or bloody stools, or abnormal vaginal bleeding? No   11. Have you ever had a blood transfusion? No   12. Are you willing to have a blood transfusion if it is medically needed before, during, or after your surgery? Yes   13. Have you or any of your relatives ever had problems with anesthesia? No   14. Do you have sleep apnea, excessive snoring or daytime drowsiness? No   15. Do you have any artifical heart valves or other implanted medical devices like a pacemaker, defibrillator, or continuous glucose monitor? No   16. Do you have artificial joints? No   17. Are you allergic to latex? No       Health Care Directive:  Patient does not have a Health Care Directive or Living Will: Discussed advance care planning with patient; however, patient declined at this time.    Preoperative Review of :   reviewed - no record of controlled substances prescribed.      Status of Chronic Conditions:  See problem list for active medical problems.  Problems all longstanding and stable, except as noted/documented.  See ROS for pertinent symptoms related to these conditions.    Review of Systems  Constitutional, neuro, ENT,  endocrine, pulmonary, cardiac, gastrointestinal, genitourinary, musculoskeletal, integument and psychiatric systems are negative, except as otherwise noted.    Patient Active Problem List    Diagnosis Date Noted    Senile osteoporosis 03/17/2023     Priority: Medium     Second course of Fosamax started 3/2023.       History of CVA (cerebrovascular accident) 01/03/2022     Priority: Medium    CAD (coronary artery disease) 10/07/2021     Priority: Medium     Mild on cath at [place]      Primary localized osteoarthritis of left hip 03/01/2021     Priority: Medium     Added automatically from request for surgery [id number]      Mood disorder (H24) 02/26/2021     Priority: Medium    Takotsubo cardiomyopathy 02/27/2020     Priority: Medium     In 2015. Admitted to [place], underwent cath with minimal coronary artery disease.  Subsequently completely resolved.       Anxiety 02/27/2020     Priority: Medium    Osteopenia of multiple sites 02/27/2020     Priority: Medium     History of vertebral fracture, s/p vertebroplasty      Claw toe, acquired 04/18/2018     Priority: Medium      Past Medical History:   Diagnosis Date    Anxiety     Breast cancer (H)     Hx of radiation therapy     MI, old     Osteoarthritis     hips    Osteopenia     Peripheral vertigo     PONV (postoperative nausea and vomiting)     Stress-induced cardiomyopathy     T12 compression fracture (H)      Past Surgical History:   Procedure Laterality Date    ARTHROPLASTY HIP Left 3/16/2021    Procedure: Left total hip arthroplasty (P[employer]on [employer] 52 mm acetabulum; #10 [employer] high offset stem; -2 neck 36 mm head);  Surgeon: Dante Becker MD;  Location: [facility] OR    BIOPSY BREAST Left Early 1990's    BREAST SURGERY      breast biopsy, breast lumpectomy    COSMETIC SURGERY      augmentation mammaplasty    GYN SURGERY      hysterectomy    HYSTERECTOMY  Early 1990's    IR THORACIC VERTEBROPLASTY  10/11/2016    LAPAROSCOPIC CHOLECYSTECTOMY WITH CHOLANGIOGRAMS  N/A 3/12/2023    Procedure: CHOLECYSTECTOMY, LAPAROSCOPIC, WITH CHOLANGIOGRAM;  Surgeon: Clay Cohen MD;  Location: [facility] OR    LUMPECTOMY BREAST Left Early 1990's    MAMMOPLASTY AUGMENTATION Bilateral Late 1990's    OPEN REDUCTION INTERNAL FIXATION HIP NAILING Right 2/14/2022    Procedure: RIGHT INTERTROCHANTERIC FEMUR NAILING;  Surgeon: Karthik Carcamo MD;  Location: [facility] OR    ORTHOPEDIC SURGERY Bilateral     bunionectomy/10 toes repaired    OTHER SURGICAL HISTORY      lumpectomy    REPAIR HAMMER TOE Bilateral 4/18/2018    Procedure: REPAIR HAMMER TOE;  RIGHT SECOND, FOURTH, AND FIFTH  MALLET TOE RECONSTRUCTION WITH LEFT THIRD CLAWTOE RECONSTRUCTION (CHOICE)  ;  Surgeon: Edgar Carlisle MD;  Location: [facility] OR    WRIST SURGERY Left     ZZC TOTAL ABDOM HYSTERECTOMY      Description: Hysterectomy;  Recorded: 06/17/2013;     Current Outpatient Medications   Medication Sig Dispense Refill    acetaminophen (TYLENOL) 325 MG tablet Take 2 tablets (650 mg) by mouth every 6 hours as needed for mild pain or other (and adjunct with moderate or severe pain or per patient request)      albuterol (PROAIR HFA/PROVENTIL HFA/VENTOLIN HFA) 108 (90 Base) MCG/ACT inhaler Inhale 2 puffs into the lungs as needed for shortness of breath or wheezing 8.5 g 1    alendronate (FOSAMAX) 70 MG tablet Take 1 tablet (70 mg) by mouth every 7 days 12 tablet 3    aspirin 81 MG EC tablet Take 81 mg by mouth every morning      atorvastatin (LIPITOR) 20 MG tablet Take 1 tablet (20 mg) by mouth daily 90 tablet 3    carvedilol (COREG) 6.25 MG tablet Take 1 tablet (6.25 mg) by mouth 2 times daily (with meals) 180 tablet 3    citalopram (CELEXA) 20 MG tablet Take 1 tablet (20 mg) by mouth daily 90 tablet 3    metroNIDAZOLE (METROGEL) 0.75 % external gel Apply topically 2 times daily Apply to face      Multiple Vitamin (ONE DAILY MULTIVITAMIN ADULT PO) Take 1 tablet by mouth daily      oxybutynin ER (DITROPAN XL) 5 MG 24 hr tablet Take 1 tablet  "(5 mg) by mouth daily 90 tablet 3    spironolactone (ALDACTONE) 100 MG tablet Take 1 tablet by mouth daily         No Known Allergies     Social History     Tobacco Use    Smoking status: Former    Smokeless tobacco: Never   Substance Use Topics    Alcohol use: No       History   Drug Use No         Objective     /60 (BP Location: Right arm, Patient Position: Sitting, Cuff Size: Adult Regular)   Pulse 85   Temp 97.5  F (36.4  C) (Tympanic)   Resp 18   Ht 1.753 m (5' 9\")   Wt 65.9 kg (145 lb 3.2 oz)   LMP  (LMP Unknown)   SpO2 99%   BMI 21.44 kg/m      Physical Exam    GENERAL APPEARANCE: healthy, alert and no distress     EYES: EOMI, PERRL     HENT: ear canals and TM's normal and nose and mouth without ulcers or lesions     NECK: no adenopathy, no asymmetry, masses, or scars and thyroid normal to palpation     RESP: lungs clear to auscultation - no rales, rhonchi or wheezes     CV: regular rates and rhythm, normal S1 S2, no S3 or S4 and no murmur, click or rub     ABDOMEN:  soft, nontender, no HSM or masses and bowel sounds normal     MS: extremities normal- no gross deformities noted, no evidence of inflammation in joints, FROM in all extremities.     SKIN: no suspicious lesions or rashes     NEURO: Normal strength and tone, sensory exam grossly normal, mentation intact and speech normal     PSYCH: mentation appears normal. and affect normal/bright     LYMPHATICS: No cervical adenopathy    Recent Labs   Lab Test 03/12/23  0532 03/11/23  0810   HGB 11.8 14.2    210    140   POTASSIUM 3.8 4.3   CR 0.79 0.87        Diagnostics:  No labs were ordered during this visit.   No EKG required for low risk surgery (cataract, skin procedure, breast biopsy, etc).    Revised Cardiac Risk Index (RCRI):  The patient has the following serious cardiovascular risks for perioperative complications:   - Coronary Artery Disease (MI, positive stress test, angina, Qs on EKG) = 1 point   - Cerebrovascular Disease " (TIA or CVA) = 1 point     RCRI Interpretation: 2 points: Class III (moderate risk - 6.6% complication rate)     Estimated Functional Capacity: Performs 4 METS exercise without symptoms (e.g., light housework, stairs, 4 mph walk, 7 mph bike, slow step dance)           Signed Electronically by: Yoko Lopez MD  Copy of this evaluation report is provided to requesting physician.

## 2023-10-30 ENCOUNTER — ANCILLARY PROCEDURE (OUTPATIENT)
Dept: MAMMOGRAPHY | Facility: CLINIC | Age: 78
End: 2023-10-30
Attending: INTERNAL MEDICINE
Payer: COMMERCIAL

## 2023-10-30 DIAGNOSIS — Z12.31 ENCOUNTER FOR SCREENING MAMMOGRAM FOR BREAST CANCER: ICD-10-CM

## 2023-10-30 PROCEDURE — 77067 SCR MAMMO BI INCL CAD: CPT | Mod: TC | Performed by: RADIOLOGY

## 2023-10-30 PROCEDURE — 77063 BREAST TOMOSYNTHESIS BI: CPT | Mod: TC | Performed by: RADIOLOGY

## 2024-02-08 ENCOUNTER — PATIENT OUTREACH (OUTPATIENT)
Dept: CARE COORDINATION | Facility: CLINIC | Age: 79
End: 2024-02-08
Payer: COMMERCIAL

## 2024-02-20 ENCOUNTER — TELEPHONE (OUTPATIENT)
Dept: PEDIATRICS | Facility: CLINIC | Age: 79
End: 2024-02-20

## 2024-02-20 NOTE — TELEPHONE ENCOUNTER
"Pt left 2 messages this morning requesting clarification on whether she need colonoscopy att her age? Also, she needs an appointment for routine px. The first availability is on 7/29 through  which is not \"acceptable\" for her. Requesting a call back at [phone].    Yareli:  Pt's last px was on 3/9/23. Her last colonoscopy was on 4/10/19 and advised to repeat in 5 yrs. Please call pt to help schedule an appointment for routine px(explain that  will be out for maternity leave), if she prefers to see someone else for sooner appt, please help her schedule that appointment. The question regarding colonoscopy will be addressed by the provider during her px appointment. Thanks.    IVÁN Ruiz  Patient Advocate [facility]ason (PAL)  [facility]  Ph. [phone] / Fax. [phone]        "

## 2024-02-22 ENCOUNTER — PATIENT OUTREACH (OUTPATIENT)
Dept: CARE COORDINATION | Facility: CLINIC | Age: 79
End: 2024-02-22
Payer: COMMERCIAL

## 2024-03-18 DIAGNOSIS — M81.0 SENILE OSTEOPOROSIS: ICD-10-CM

## 2024-03-18 RX ORDER — ALENDRONATE SODIUM 70 MG/1
70 TABLET ORAL
Qty: 12 TABLET | Refills: 0 | Status: SHIPPED | OUTPATIENT
Start: 2024-03-18 | End: 2024-07-18

## 2024-06-12 DIAGNOSIS — N39.41 URGENCY INCONTINENCE: ICD-10-CM

## 2024-06-12 RX ORDER — OXYBUTYNIN CHLORIDE 5 MG/1
5 TABLET, EXTENDED RELEASE ORAL DAILY
Qty: 90 TABLET | Refills: 0 | Status: SHIPPED | OUTPATIENT
Start: 2024-06-12

## 2024-07-05 DIAGNOSIS — E78.2 MIXED HYPERLIPIDEMIA: ICD-10-CM

## 2024-07-07 RX ORDER — ATORVASTATIN CALCIUM 20 MG/1
20 TABLET, FILM COATED ORAL DAILY
Qty: 90 TABLET | Refills: 0 | Status: SHIPPED | OUTPATIENT
Start: 2024-07-07

## 2024-07-08 NOTE — TELEPHONE ENCOUNTER
1st attempt: sent pt a [employer] message regarding PCP's message below.    Vianney Infante MA 2:50 PM 7/8/2024

## 2024-07-09 NOTE — TELEPHONE ENCOUNTER
Called patient and notified that RX was filled for one time only since she had annual done in [facility]. Pt confirms that she is establishing care with [facility].     Informed pt that future refill requests will need to be sent to her new doctor's office from now on. Pt verbally agreed.    Vianney Infante MA 11:47 AM 7/9/2024

## 2024-07-18 DIAGNOSIS — M81.0 SENILE OSTEOPOROSIS: ICD-10-CM

## 2024-07-18 RX ORDER — ALENDRONATE SODIUM 70 MG/1
70 TABLET ORAL
Qty: 12 TABLET | Refills: 0 | Status: SHIPPED | OUTPATIENT
Start: 2024-07-18

## 2024-07-18 NOTE — TELEPHONE ENCOUNTER
Called and spoke with Patient.     Patient stated she was dissatisfied with her care and has transitioned to [facility].   She informed that there were never any updates or outreaches made to her in regards to her health and etc.   She sounded very frustrated and so I apologized if there were any miscommunication on our end.     No further action made.     Heidi Valdez MA

## 2024-09-30 ENCOUNTER — PATIENT OUTREACH (OUTPATIENT)
Dept: CARE COORDINATION | Facility: CLINIC | Age: 79
End: 2024-09-30
Payer: COMMERCIAL

## 2024-10-18 DIAGNOSIS — E78.2 MIXED HYPERLIPIDEMIA: ICD-10-CM

## 2024-10-18 RX ORDER — ATORVASTATIN CALCIUM 20 MG/1
20 TABLET, FILM COATED ORAL DAILY
Qty: 90 TABLET | Refills: 0 | OUTPATIENT
Start: 2024-10-18

## 2024-10-18 NOTE — TELEPHONE ENCOUNTER
Last refill for this was done in July, but there is a note in later July that patient has transitioned her care to an outside clinic.    Se note below:    7/18/24  4:47 PM  Note  Called and spoke with Patient.      Patient stated she was dissatisfied with her care and has transitioned to [facility].   She informed that there were never any updates or outreaches made to her in regards to her health and etc.   She sounded very frustrated and so I apologized if there were any miscommunication on our end.      No further action made.

## 2024-10-28 ENCOUNTER — PATIENT OUTREACH (OUTPATIENT)
Dept: CARE COORDINATION | Facility: CLINIC | Age: 79
End: 2024-10-28
Payer: COMMERCIAL

## 2024-11-14 DIAGNOSIS — M81.0 SENILE OSTEOPOROSIS: ICD-10-CM

## 2024-11-14 RX ORDER — ALENDRONATE SODIUM 70 MG/1
70 TABLET ORAL
Qty: 12 TABLET | Refills: 0 | OUTPATIENT
Start: 2024-11-14

## 2024-11-14 NOTE — TELEPHONE ENCOUNTER
Did patient switch care to [facility]--Dr. Torres?   The refill request must go there instead.   Vera Cevallos PA-C

## 2024-11-14 NOTE — TELEPHONE ENCOUNTER
RN called and spoke with patient, patient is at [facility] and states the request should have gone to [facility].     RN called and informed pharmacy script request needs to be sent to Dr Torres. Pharmacy verbalized understanding.    Rona Jefferson RN

## 2025-01-25 ENCOUNTER — HEALTH MAINTENANCE LETTER (OUTPATIENT)
Age: 80
End: 2025-01-25

## 2025-05-03 ENCOUNTER — HEALTH MAINTENANCE LETTER (OUTPATIENT)
Age: 80
End: 2025-05-03

## (undated) DEVICE — BLADE SAW SAGITTAL STRK 18X90X1.27MM HD SYS 6 6118-127-090

## (undated) DEVICE — BLADE CLIPPER 4412A

## (undated) DEVICE — CAST PADDING 4" COTTON WEBRIL UNSTERILE 9084

## (undated) DEVICE — Device

## (undated) DEVICE — DRAPE IOBAN INCISE 23X17" 6650EZ

## (undated) DEVICE — SOL NACL 0.9% IRRIG 3000ML BAG 2B7477

## (undated) DEVICE — PACK EXTREMITY SOP15EXFSD

## (undated) DEVICE — SU VICRYL 2-0 CT-1 27" UND J259H

## (undated) DEVICE — ENDO TROCAR SLEEVE KII Z-THREADED 05X100MM CTS02

## (undated) DEVICE — DRAPE LEGGINGS 8421

## (undated) DEVICE — SYR 30ML LL W/O NDL 302832

## (undated) DEVICE — DRSG ABDOMINAL 07 1/2X8" 7197D

## (undated) DEVICE — GLOVE PROTEXIS W/NEU-THERA 8.0  2D73TE80

## (undated) DEVICE — DRSG ADAPTIC 3X8" 6113

## (undated) DEVICE — GLOVE PROTEGRITY MICRO 8.5 LATEX

## (undated) DEVICE — SOL WATER IRRIG 1000ML BOTTLE 2F7114

## (undated) DEVICE — BLADE KNIFE SURG 11 371111

## (undated) DEVICE — LINEN HALF SHEET 5512

## (undated) DEVICE — ESU GROUND PAD ADULT W/CORD E7507

## (undated) DEVICE — GLOVE PROTEXIS POWDER FREE 6.5 ORTHOPEDIC 2D73ET65

## (undated) DEVICE — PACK HIP NAILING SOP15HNSB

## (undated) DEVICE — LINEN TOWEL PACK X5 5464

## (undated) DEVICE — CONNECTOR STOPCOCK 3 WAY MALE LL HI-FLO MX9311L

## (undated) DEVICE — CAST PADDING 4" UNSTERILE 9044

## (undated) DEVICE — DRAPE C-ARM 60X42" 1013

## (undated) DEVICE — GLOVE BIOGEL PI MICRO INDICATOR UNDERGLOVE SZ 8.0 48980

## (undated) DEVICE — ACTIVATION TOOL

## (undated) DEVICE — RAD RX ISOVUE 300 (50ML) 61% IOPAMIDOL CHARGE PER ML

## (undated) DEVICE — DRAPE POUCH INSTRUMENT 1018

## (undated) DEVICE — DRSG KERLIX FLUFFS X5

## (undated) DEVICE — STPL SKIN 35W 6.9MM  PXW35

## (undated) DEVICE — LINEN ORTHO ACL PACK 5447

## (undated) DEVICE — IMM LIMB ELEVATOR DC40-0203

## (undated) DEVICE — SU VICRYL 4-0 PS-2 18" UND J496H

## (undated) DEVICE — PREP SKIN SCRUB TRAY 4461A

## (undated) DEVICE — ESU CORD MONOPOLAR 10'  E0510

## (undated) DEVICE — SU PDS II 0 ENDOLOOP EZ10G

## (undated) DEVICE — CAST PADDING 4" STERILE 9044S

## (undated) DEVICE — SU VICRYL 0 CT-1 27" J340H

## (undated) DEVICE — LINEN FULL SHEET 5511

## (undated) DEVICE — ENDO TROCAR BLUNT TIP KII BALLOON 12X100MM C0R47

## (undated) DEVICE — ESU ELEC BLADE 2.75" COATED/INSULATED E1455

## (undated) DEVICE — DRSG GAUZE 4X4" 3033

## (undated) DEVICE — TOURNIQUET CUFF 30" STERILE

## (undated) DEVICE — SET HANDPIECE INTERPULSE W/COAXIAL FAN SPRAY TIP 0210118000

## (undated) DEVICE — GLOVE BIOGEL PI MICRO SZ 7.5 48575

## (undated) DEVICE — BAG CLEAR TRASH 1.3M 39X33" P4040C

## (undated) DEVICE — STPL SKIN 35W APPOSE 8886803712

## (undated) DEVICE — NDL 25GA 1.5" 305127

## (undated) DEVICE — GLOVE PROTEXIS BLUE W/NEU-THERA 7.0  2D73EB70

## (undated) DEVICE — GOWN IMPERVIOUS SPECIALTY XL/XLONG 39049

## (undated) DEVICE — ESU PENCIL W/HOLSTER E2350H

## (undated) DEVICE — TUBING IV EXT SET MICROBORE 6" LL ADAPTER 2N3374

## (undated) DEVICE — SUCTION IRR STRYKERFLOW II W/TIP 250-070-520

## (undated) DEVICE — SOL NACL 0.9% INJ 250ML BAG 2B1322Q

## (undated) DEVICE — PIN GUARD 10-1-001 101001PBX

## (undated) DEVICE — DRAPE EXTREMITY BILAT

## (undated) DEVICE — DRAIN ROUND W/RESERV KIT JACKSON PRATT 10FR 400ML SU130-402D

## (undated) DEVICE — BNDG COBAN 2"X5YDS UNSTERILE 2082

## (undated) DEVICE — ESU GROUND PAD UNIVERSAL W/O CORD

## (undated) DEVICE — DRSG XEROFORM 1X8"

## (undated) DEVICE — GLOVE PROTEXIS BLUE W/NEU-THERA 9.0  2D73EB90

## (undated) DEVICE — GLOVE PROTEXIS BLUE W/NEU-THERA 8.0  2D73EB80

## (undated) DEVICE — LINEN DRAPE 54X72" 5467

## (undated) DEVICE — GLOVE PROTEXIS W/NEU-THERA 7.0  2D73TE70

## (undated) DEVICE — SU ETHIBOND 2 V-37 4X30" MX69G

## (undated) DEVICE — SUCTION CANISTER MEDIVAC LINER 3000ML W/LID 65651-530

## (undated) DEVICE — CATH CHOLANGIOGRAM KUMAR CC-019

## (undated) DEVICE — SU VICRYL 0 UR-6 27" J603H

## (undated) DEVICE — NDL 22GA 1.5"

## (undated) DEVICE — BNDG ROLLER GAUZE CONFORM 2"X4YD 41-52

## (undated) DEVICE — MANIFOLD NEPTUNE 4 PORT 700-20

## (undated) DEVICE — CLIP APPLIER ENDO 5MM M/L LIGAMAX EL5ML

## (undated) DEVICE — GOWN XXLG REINFORCED 9071EL

## (undated) DEVICE — SPONGE LAP 18X18" X8435

## (undated) DEVICE — BLADE SAW SAGITTAL MICROAIRE 1200-006

## (undated) DEVICE — BLADE SAW SAGITTAL MICROAIRE  1200-005

## (undated) DEVICE — GLOVE PROTEXIS POWDER FREE 7.5 ORTHOPEDIC 2D73ET75

## (undated) DEVICE — PACK TOTAL HIP RIDGES LATEX PO15HIFSG

## (undated) DEVICE — DRSG GAUZE 4X8"

## (undated) DEVICE — ENDO TROCAR FIRST ENTRY KII FIOS Z-THRD 05X100MM CTF03

## (undated) DEVICE — SU PROLENE 3-0 PS-2 18" 8687H

## (undated) DEVICE — SU VICRYL 1 MO-4 18" J702D

## (undated) DEVICE — LINEN POUCH DBL 5427

## (undated) DEVICE — DRAPE SHEET REV FOLD 3/4 9349

## (undated) DEVICE — ENDO POUCH UNIV RETRIEVAL SYSTEM INZII 10MM CD001

## (undated) DEVICE — SOL NACL 0.9% IRRIG 1000ML BOTTLE 2F7124

## (undated) DEVICE — SU SILK 2-0 TIE 24" SA75H

## (undated) DEVICE — PREP DURAPREP 26ML APL 8630

## (undated) DEVICE — DECANTER BAG 2002S

## (undated) DEVICE — SYR BULB IRRIG 50ML LATEX FREE 0035280

## (undated) DEVICE — PREP CHLORAPREP 26ML TINTED HI-LITE ORANGE 930815

## (undated) DEVICE — SU VICRYL 2-0 X-1 27" UND J459H

## (undated) DEVICE — DRSG STERI STRIP 1/2X4" R1547

## (undated) RX ORDER — DEXAMETHASONE SODIUM PHOSPHATE 4 MG/ML
INJECTION, SOLUTION INTRA-ARTICULAR; INTRALESIONAL; INTRAMUSCULAR; INTRAVENOUS; SOFT TISSUE
Status: DISPENSED
Start: 2018-04-18

## (undated) RX ORDER — OXYCODONE HYDROCHLORIDE 5 MG/1
TABLET ORAL
Status: DISPENSED
Start: 2018-04-18

## (undated) RX ORDER — CEFAZOLIN SODIUM 2 G/100ML
INJECTION, SOLUTION INTRAVENOUS
Status: DISPENSED
Start: 2018-04-18

## (undated) RX ORDER — EPHEDRINE SULFATE 50 MG/ML
INJECTION, SOLUTION INTRAMUSCULAR; INTRAVENOUS; SUBCUTANEOUS
Status: DISPENSED
Start: 2021-03-16

## (undated) RX ORDER — ENOXAPARIN SODIUM 100 MG/ML
INJECTION SUBCUTANEOUS
Status: DISPENSED
Start: 2023-03-12

## (undated) RX ORDER — CEFAZOLIN SODIUM/WATER 2 G/20 ML
SYRINGE (ML) INTRAVENOUS
Status: DISPENSED
Start: 2022-02-14

## (undated) RX ORDER — APREPITANT 40 MG/1
CAPSULE ORAL
Status: DISPENSED
Start: 2022-02-14

## (undated) RX ORDER — PROPOFOL 10 MG/ML
INJECTION, EMULSION INTRAVENOUS
Status: DISPENSED
Start: 2023-03-12

## (undated) RX ORDER — FENTANYL CITRATE 50 UG/ML
INJECTION, SOLUTION INTRAMUSCULAR; INTRAVENOUS
Status: DISPENSED
Start: 2023-03-12

## (undated) RX ORDER — CEFAZOLIN SODIUM 2 G/100ML
INJECTION, SOLUTION INTRAVENOUS
Status: DISPENSED
Start: 2021-03-16

## (undated) RX ORDER — KETOROLAC TROMETHAMINE 30 MG/ML
INJECTION, SOLUTION INTRAMUSCULAR; INTRAVENOUS
Status: DISPENSED
Start: 2018-04-18

## (undated) RX ORDER — GLYCOPYRROLATE 0.2 MG/ML
INJECTION INTRAMUSCULAR; INTRAVENOUS
Status: DISPENSED
Start: 2023-03-12

## (undated) RX ORDER — ACETAMINOPHEN 325 MG/1
TABLET ORAL
Status: DISPENSED
Start: 2021-03-16

## (undated) RX ORDER — ACETAMINOPHEN 325 MG/1
TABLET ORAL
Status: DISPENSED
Start: 2023-03-12

## (undated) RX ORDER — DEXAMETHASONE SODIUM PHOSPHATE 4 MG/ML
INJECTION, SOLUTION INTRA-ARTICULAR; INTRALESIONAL; INTRAMUSCULAR; INTRAVENOUS; SOFT TISSUE
Status: DISPENSED
Start: 2023-03-12

## (undated) RX ORDER — FENTANYL CITRATE-0.9 % NACL/PF 10 MCG/ML
PLASTIC BAG, INJECTION (ML) INTRAVENOUS
Status: DISPENSED
Start: 2021-03-16

## (undated) RX ORDER — GLYCOPYRROLATE 0.2 MG/ML
INJECTION INTRAMUSCULAR; INTRAVENOUS
Status: DISPENSED
Start: 2021-03-16

## (undated) RX ORDER — ONDANSETRON 2 MG/ML
INJECTION INTRAMUSCULAR; INTRAVENOUS
Status: DISPENSED
Start: 2023-03-12

## (undated) RX ORDER — PROPOFOL 10 MG/ML
INJECTION, EMULSION INTRAVENOUS
Status: DISPENSED
Start: 2018-04-18

## (undated) RX ORDER — ONDANSETRON 2 MG/ML
INJECTION INTRAMUSCULAR; INTRAVENOUS
Status: DISPENSED
Start: 2018-04-18

## (undated) RX ORDER — TRANEXAMIC ACID 10 MG/ML
INJECTION, SOLUTION INTRAVENOUS
Status: DISPENSED
Start: 2022-02-14

## (undated) RX ORDER — DEXAMETHASONE SODIUM PHOSPHATE 4 MG/ML
INJECTION, SOLUTION INTRA-ARTICULAR; INTRALESIONAL; INTRAMUSCULAR; INTRAVENOUS; SOFT TISSUE
Status: DISPENSED
Start: 2021-03-16

## (undated) RX ORDER — PROPOFOL 10 MG/ML
INJECTION, EMULSION INTRAVENOUS
Status: DISPENSED
Start: 2021-03-16

## (undated) RX ORDER — FENTANYL CITRATE 50 UG/ML
INJECTION, SOLUTION INTRAMUSCULAR; INTRAVENOUS
Status: DISPENSED
Start: 2021-03-16

## (undated) RX ORDER — INDOCYANINE GREEN AND WATER 25 MG
KIT INJECTION
Status: DISPENSED
Start: 2023-03-12

## (undated) RX ORDER — SCOLOPAMINE TRANSDERMAL SYSTEM 1 MG/1
PATCH, EXTENDED RELEASE TRANSDERMAL
Status: DISPENSED
Start: 2021-03-16

## (undated) RX ORDER — MORPHINE SULFATE 1 MG/ML
INJECTION, SOLUTION EPIDURAL; INTRATHECAL; INTRAVENOUS
Status: DISPENSED
Start: 2023-03-12

## (undated) RX ORDER — ACETAMINOPHEN 500 MG
TABLET ORAL
Status: DISPENSED
Start: 2022-02-14

## (undated) RX ORDER — BUPIVACAINE HYDROCHLORIDE 2.5 MG/ML
INJECTION, SOLUTION EPIDURAL; INFILTRATION; INTRACAUDAL
Status: DISPENSED
Start: 2018-04-18

## (undated) RX ORDER — HYDROMORPHONE HYDROCHLORIDE 1 MG/ML
INJECTION, SOLUTION INTRAMUSCULAR; INTRAVENOUS; SUBCUTANEOUS
Status: DISPENSED
Start: 2021-03-16

## (undated) RX ORDER — PROPOFOL 10 MG/ML
INJECTION, EMULSION INTRAVENOUS
Status: DISPENSED
Start: 2022-02-14

## (undated) RX ORDER — ONDANSETRON 2 MG/ML
INJECTION INTRAMUSCULAR; INTRAVENOUS
Status: DISPENSED
Start: 2021-03-16

## (undated) RX ORDER — FENTANYL CITRATE 50 UG/ML
INJECTION, SOLUTION INTRAMUSCULAR; INTRAVENOUS
Status: DISPENSED
Start: 2018-04-18

## (undated) RX ORDER — OXYCODONE HCL 10 MG/1
TABLET, FILM COATED, EXTENDED RELEASE ORAL
Status: DISPENSED
Start: 2021-03-16

## (undated) RX ORDER — LIDOCAINE HYDROCHLORIDE 10 MG/ML
INJECTION, SOLUTION EPIDURAL; INFILTRATION; INTRACAUDAL; PERINEURAL
Status: DISPENSED
Start: 2021-03-16

## (undated) RX ORDER — FENTANYL CITRATE 50 UG/ML
INJECTION, SOLUTION INTRAMUSCULAR; INTRAVENOUS
Status: DISPENSED
Start: 2022-02-14

## (undated) RX ORDER — FENTANYL CITRATE-0.9 % NACL/PF 10 MCG/ML
PLASTIC BAG, INJECTION (ML) INTRAVENOUS
Status: DISPENSED
Start: 2023-03-12

## (undated) RX ORDER — BUPIVACAINE HYDROCHLORIDE AND EPINEPHRINE 5; 5 MG/ML; UG/ML
INJECTION, SOLUTION EPIDURAL; INTRACAUDAL; PERINEURAL
Status: DISPENSED
Start: 2023-03-12

## (undated) RX ORDER — LIDOCAINE HYDROCHLORIDE 10 MG/ML
INJECTION, SOLUTION EPIDURAL; INFILTRATION; INTRACAUDAL; PERINEURAL
Status: DISPENSED
Start: 2023-03-12

## (undated) RX ORDER — CEFAZOLIN SODIUM 1 G/3ML
INJECTION, POWDER, FOR SOLUTION INTRAMUSCULAR; INTRAVENOUS
Status: DISPENSED
Start: 2018-04-18

## (undated) RX ORDER — DEXAMETHASONE SODIUM PHOSPHATE 4 MG/ML
INJECTION, SOLUTION INTRA-ARTICULAR; INTRALESIONAL; INTRAMUSCULAR; INTRAVENOUS; SOFT TISSUE
Status: DISPENSED
Start: 2022-02-14

## (undated) RX ORDER — TRANEXAMIC ACID 650 MG/1
TABLET ORAL
Status: DISPENSED
Start: 2021-03-16

## (undated) RX ORDER — NEOSTIGMINE METHYLSULFATE 1 MG/ML
VIAL (ML) INJECTION
Status: DISPENSED
Start: 2021-03-16

## (undated) RX ORDER — LIDOCAINE HYDROCHLORIDE 20 MG/ML
INJECTION, SOLUTION EPIDURAL; INFILTRATION; INTRACAUDAL; PERINEURAL
Status: DISPENSED
Start: 2018-04-18

## (undated) RX ORDER — NEOSTIGMINE METHYLSULFATE 1 MG/ML
VIAL (ML) INJECTION
Status: DISPENSED
Start: 2023-03-12